# Patient Record
Sex: FEMALE | Race: WHITE | NOT HISPANIC OR LATINO | Employment: UNEMPLOYED
[De-identification: names, ages, dates, MRNs, and addresses within clinical notes are randomized per-mention and may not be internally consistent; named-entity substitution may affect disease eponyms.]

---

## 2017-09-03 ENCOUNTER — HEALTH MAINTENANCE LETTER (OUTPATIENT)
Age: 27
End: 2017-09-03

## 2018-11-19 ENCOUNTER — OFFICE VISIT (OUTPATIENT)
Dept: OBGYN | Facility: CLINIC | Age: 28
End: 2018-11-19
Payer: COMMERCIAL

## 2018-11-19 VITALS
SYSTOLIC BLOOD PRESSURE: 143 MMHG | DIASTOLIC BLOOD PRESSURE: 77 MMHG | TEMPERATURE: 98.8 F | HEART RATE: 90 BPM | WEIGHT: 126.4 LBS | HEIGHT: 61 IN | RESPIRATION RATE: 16 BRPM | BODY MASS INDEX: 23.86 KG/M2

## 2018-11-19 DIAGNOSIS — Z30.46 ENCOUNTER FOR NEXPLANON REMOVAL: Primary | ICD-10-CM

## 2018-11-19 DIAGNOSIS — Z30.013 ENCOUNTER FOR INITIAL PRESCRIPTION OF INJECTABLE CONTRACEPTIVE: ICD-10-CM

## 2018-11-19 PROCEDURE — 96372 THER/PROPH/DIAG INJ SC/IM: CPT | Mod: 59 | Performed by: OBSTETRICS & GYNECOLOGY

## 2018-11-19 PROCEDURE — 11982 REMOVE DRUG IMPLANT DEVICE: CPT | Performed by: OBSTETRICS & GYNECOLOGY

## 2018-11-19 RX ORDER — NALTREXONE 380 MG
KIT INTRAMUSCULAR
COMMUNITY
Start: 2018-10-31 | End: 2021-11-02

## 2018-11-19 RX ORDER — MEDROXYPROGESTERONE ACETATE 150 MG/ML
150 INJECTION, SUSPENSION INTRAMUSCULAR
Qty: 0.9 ML | Refills: 4 | OUTPATIENT
Start: 2018-11-19 | End: 2021-11-02

## 2018-11-19 NOTE — PROGRESS NOTES
Nexplanon Removal Procedure Note    Beryl Valenzuela  1990  [id number]    The patient was counseled on the risks benefits, and alternatives of the procedure - please see attached note for counseling. Nexplanon now 6 yrs old. Pt reports still having long periods of amenorrhea on it. Had pregnancy test in Oct in treatment center and hasn't had intercourse since then. Pt refused UPT today. Verbal and written consent were obtained for removal of  Nexplanon.     Technique: The patient was placed in the dorsal position with the left arm elevated. The Nexplanon was located in the left arm. Local anesthesia was injected and the area was cleaned with betadine swabs x3. An incision was made over the end of the Nexplanon, grasped with a yisel clamp and removed intact. A steri-strip was placed over the insertion site. A compression dressing was placed. The patient tolerated the procedure well. EBL: 2cc.  She was given post op instructions with appropriate precautions.     A/P: s/p Nexplanon removal as it is . Desires depo provera which was given today. She was counseled on the bleeding profile of this, which she has had in the past and tolerated without any issues. Return in three months for repeat injection.     Ju Pardo MD  OB/GYN   18

## 2018-11-19 NOTE — NURSING NOTE
"Initial /77 (BP Location: Right arm, Patient Position: Chair, Cuff Size: Adult Regular)  Pulse 90  Temp 98.8  F (37.1  C) (Tympanic)  Resp 16  Ht 5' 1\" (1.549 m)  Wt 126 lb 6.4 oz (57.3 kg)  LMP 11/17/2018  BMI 23.88 kg/m2 Estimated body mass index is 23.88 kg/(m^2) as calculated from the following:    Height as of this encounter: 5' 1\" (1.549 m).    Weight as of this encounter: 126 lb 6.4 oz (57.3 kg). .      "

## 2018-11-19 NOTE — MR AVS SNAPSHOT
After Visit Summary   11/19/2018    Beryl Valenzuela    MRN: [id number]           Patient Information     Date Of Birth          1990        Visit Information        Provider Department      11/19/2018 1:30 PM Ju Pardo MD [facility]        Today's Diagnoses     Encounter for Nexplanon removal    -  1    Encounter for initial prescription of injectable contraceptive           Follow-ups after your visit        Future tests that were ordered for you today     Open Standing Orders        Priority Remaining Interval Expires Ordered    Medroxyprogesterone inj  1mg   (Depo Provera J-Code) Routine 3/4  11/19/2019 11/19/2018    INJECTION INTRAMUSCULAR OR SUB-Q Routine 3/4  11/19/2019 11/19/2018            Who to contact     If you have questions or need follow up information about today's clinic visit or your schedule please contact [facility] directly at [phone].  Normal or non-critical lab and imaging results will be communicated to you by MyChart, letter or phone within 4 business days after the clinic has received the results. If you do not hear from us within 7 days, please contact the clinic through [employer] or phone. If you have a critical or abnormal lab result, we will notify you by phone as soon as possible.  Submit refill requests through [employer] or call your pharmacy and they will forward the refill request to us. Please allow 3 business days for your refill to be completed.          Additional Information About Your Visit        MyChart Information     [employer] gives you secure access to your electronic health record. If you see a primary care provider, you can also send messages to your care team and make appointments. If you have questions, please call your primary care clinic.  If you do not have a primary care provider, please call [phone] and they will assist you.        Care EveryWhere ID     This is your Care EveryWhere ID. This  "could be used by other organizations to access your [facility] medical records  [id number]        Your Vitals Were     Pulse Temperature Respirations Height Last Period BMI (Body Mass Index)    90 98.8  F (37.1  C) (Tympanic) 16 5' 1\" (1.549 m) 11/17/2018 23.88 kg/m2       Blood Pressure from Last 3 Encounters:   11/19/18 143/77   02/15/16 121/78   07/09/15 127/88    Weight from Last 3 Encounters:   11/19/18 126 lb 6.4 oz (57.3 kg)   07/09/15 117 lb (53.1 kg)   07/06/15 117 lb 8 oz (53.3 kg)              We Performed the Following     REMOVAL NEXPLANON          Today's Medication Changes          These changes are accurate as of 11/19/18 11:59 PM.  If you have any questions, ask your nurse or doctor.               Start taking these medicines.        Dose/Directions    medroxyPROGESTERone 150 MG/ML injection   Commonly known as:  DEPO-PROVERA   Used for:  Encounter for initial prescription of injectable contraceptive   Started by:  Ju Pardo MD        Dose:  150 mg   Inject 1 mL (150 mg) into the muscle every 3 months   Quantity:  0.9 mL   Refills:  4            Where to get your medicines      Some of these will need a paper prescription and others can be bought over the counter.  Ask your nurse if you have questions.     You don't need a prescription for these medications     medroxyPROGESTERone 150 MG/ML injection                Primary Care Provider Office Phone # Fax #    Carlos Cisneros PA-C [phone] [address] [facility] [address]        Equal Access to Services     [facility] AH: Hadii aad ku hadasho Soelysia, waaxda luqadaha, qaybta kaalmada yoan, suzie zavaleta. So [facility] [phone].    ATENCIÓN: Si habla español, tiene a ruby disposición servicios gratuitos de asistencia lingüística. Llame al [phone].    We comply with applicable federal civil rights laws and [place] laws. We do not discriminate on the basis of race, " color, national origin, age, disability, sex, sexual orientation, or gender identity.            Thank you!     Thank you for choosing [facility]  for your care. Our goal is always to provide you with excellent care. Hearing back from our patients is one way we can continue to improve our services. Please take a few minutes to complete the written survey that you may receive in the mail after your visit with us. Thank you!             Your Updated Medication List - Protect others around you: Learn how to safely use, store and throw away your medicines at www.disposemymeds.org.          This list is accurate as of 11/19/18 11:59 PM.  Always use your most recent med list.                   Brand Name Dispense Instructions for use Diagnosis    ADDERALL XR 20 MG 24 hr capsule   Generic drug:  amphetamine-dextroamphetamine      Take 2 capsules by mouth daily        buPROPion 300 MG 24 hr tablet    WELLBUTRIN XL     Take 1 tablet by mouth every morning        etonogestrel 68 MG Impl    IMPLANON/NEXPLANON     1 each by Subdermal route once        HYDROcodone-acetaminophen 5-325 MG per tablet    NORCO    10 tablet    Take 1 tablet by mouth every 6 hours as needed for moderate to severe pain    Open fracture of tuft of distal phalanx of finger, initial encounter       medroxyPROGESTERone 150 MG/ML injection    DEPO-PROVERA    0.9 mL    Inject 1 mL (150 mg) into the muscle every 3 months    Encounter for initial prescription of injectable contraceptive       mirtazapine 15 MG tablet    REMERON    30 tablet    Take 1 tablet (15 mg) by mouth At Bedtime    Insomnia       ondansetron 4 MG ODT tab    ZOFRAN ODT    10 tablet    Take 1 tablet (4 mg) by mouth every 8 hours as needed for nausea    Contusion of thumb with damage to nail, Nail avulsion, finger, initial encounter       * oxyCODONE IR 5 MG tablet    ROXICODONE    15 tablet    Take 1 tablet (5 mg) by mouth every 6 hours as needed for moderate to severe pain     Contusion of thumb with damage to nail, Nail avulsion, finger, initial encounter       * oxyCODONE 5 MG capsule    OXY-IR    5 capsule    Take 1 capsule (5 mg) by mouth every 6 hours as needed for moderate to severe pain    Broken thumb, right, closed, initial encounter       phenazopyridine 100 MG tablet    PYRIDIUM    6 tablet    Take 1-2 tablets (100-200 mg) by mouth 3 times daily as needed for urinary tract discomfort    Dysuria       VIVITROL 380 MG Susr   Generic drug:  naltrexone           XANAX PO      Take 0.5 mg by mouth 4 times daily        * Notice:  This list has 2 medication(s) that are the same as other medications prescribed for you. Read the directions carefully, and ask your doctor or other care provider to review them with you.

## 2019-04-10 ENCOUNTER — TELEPHONE (OUTPATIENT)
Dept: OBGYN | Facility: CLINIC | Age: 29
End: 2019-04-10

## 2019-04-10 NOTE — LETTER
April 10, 2019      Beryl Valenzuela  [address] [facility] [address]        Dear ,      This letter is to remind you that you are due for your follow up PAP smear .    Please call [phone] to schedule your appointment at your earliest convenience.     If you have completed the tests outside of [facility], please have the results forwarded to our office. We will update the chart for your primary Physician to review before your next annual physical.         Sincerely,      Your [facility] Care Team

## 2019-04-10 NOTE — TELEPHONE ENCOUNTER
Panel Management Review      Health Maintenance List    Health Maintenance   Topic Date Due     PHQ-2 Q1 YR  05/24/2003     URINE DRUG SCREEN Q1 YR  05/24/2006     PREVENTIVE CARE VISIT  09/14/2013     PAP Q2 YEARS  09/14/2014     INFLUENZA VACCINE (1) 09/01/2018     DTAP/TDAP/TD IMMUNIZATION (8 - Td) 05/23/2022     ZOSTER IMMUNIZATION (1 of 2) 05/24/2041     MIGRAINE ACTION PLAN  Completed     HIV SCREEN (SYSTEM ASSIGNED)  Completed     IPV IMMUNIZATION  Completed     MENINGITIS IMMUNIZATION  Aged Out       Composite cancer screening  Chart review shows that this patient is due/due soon for the following Pap Smear  Lab Results   Component Value Date    PAP NIL 09/14/2012     Past Surgical History:   Procedure Laterality Date     NO HISTORY OF SURGERY         Is hysterectomy listed in surgical history? No   Is mastectomy listed in surgical history? No     Summary:    Patient is due/failing the following:   Pap Smear    Action needed: Patient needs office visit for Pap Smear.    Type of outreach:  Sent [employer] message.      Staff Signature:  Anai Springer LPN

## 2019-09-11 ENCOUNTER — TELEPHONE (OUTPATIENT)
Dept: OBGYN | Facility: CLINIC | Age: 29
End: 2019-09-11

## 2019-09-11 NOTE — LETTER
September 11, 2019      Beryl Valenzuela  [address] [facility] [address]    Dear ,      This letter is to remind you that you are due for your follow up PAP smear.    Please call [phone] to schedule your appointment at your earliest convenience.     If you have completed the tests outside of [facility], please have the results forwarded to our office. We will update the chart for your primary Physician to review before your next annual physical.     Sincerely,      Your [facility] Care Team

## 2019-09-11 NOTE — TELEPHONE ENCOUNTER
Panel Management Review      Health Maintenance List    Health Maintenance   Topic Date Due     URINE DRUG SCREEN  1990     PREVENTIVE CARE VISIT  09/14/2013     PAP  09/14/2014     PHQ-2  01/01/2019     INFLUENZA VACCINE (1) 09/01/2019     DTAP/TDAP/TD IMMUNIZATION (8 - Td) 05/23/2022     HIV SCREENING  Completed     MIGRAINE ACTION PLAN  Completed     IPV IMMUNIZATION  Completed     MENINGITIS IMMUNIZATION  Aged Out       Composite cancer screening  Chart review shows that this patient is due/due soon for the following Pap Smear  Lab Results   Component Value Date    PAP NIL 09/14/2012     Past Surgical History:   Procedure Laterality Date     NO HISTORY OF SURGERY         Is hysterectomy listed in surgical history? No   Is mastectomy listed in surgical history? No     Summary:    Patient is due/failing the following:   Pap Smear    Action needed: Patient needs office visit for Pap.    Type of outreach:  Sent [employer] message.      Staff Signature:  Anai Springer LPN

## 2020-02-17 ENCOUNTER — HEALTH MAINTENANCE LETTER (OUTPATIENT)
Age: 30
End: 2020-02-17

## 2020-11-29 ENCOUNTER — HEALTH MAINTENANCE LETTER (OUTPATIENT)
Age: 30
End: 2020-11-29

## 2020-12-17 ENCOUNTER — OFFICE VISIT (OUTPATIENT)
Dept: URGENT CARE | Facility: URGENT CARE | Age: 30
End: 2020-12-17
Payer: COMMERCIAL

## 2020-12-17 VITALS
HEART RATE: 88 BPM | SYSTOLIC BLOOD PRESSURE: 118 MMHG | DIASTOLIC BLOOD PRESSURE: 72 MMHG | TEMPERATURE: 97.6 F | OXYGEN SATURATION: 100 %

## 2020-12-17 DIAGNOSIS — M25.511 ACUTE PAIN OF RIGHT SHOULDER: Primary | ICD-10-CM

## 2020-12-17 PROCEDURE — 99203 OFFICE O/P NEW LOW 30 MIN: CPT | Performed by: NURSE PRACTITIONER

## 2020-12-17 RX ORDER — CYCLOBENZAPRINE HCL 10 MG
10 TABLET ORAL 3 TIMES DAILY PRN
Qty: 21 TABLET | Refills: 0 | Status: SHIPPED | OUTPATIENT
Start: 2020-12-17 | End: 2020-12-24

## 2020-12-17 RX ORDER — METHYLPREDNISOLONE 4 MG
TABLET, DOSE PACK ORAL
Qty: 21 TABLET | Refills: 0 | Status: SHIPPED | OUTPATIENT
Start: 2020-12-17 | End: 2021-11-02

## 2020-12-17 ASSESSMENT — ENCOUNTER SYMPTOMS
SORE THROAT: 0
DIARRHEA: 0
RHINORRHEA: 0
VOMITING: 0
COUGH: 0
NAUSEA: 0
SHORTNESS OF BREATH: 0
HEADACHES: 0
CHILLS: 0
FEVER: 0

## 2020-12-17 NOTE — PROGRESS NOTES
"SUBJECTIVE:   Beryl Valenzuela is a 30 year old female presenting with a chief complaint of   Chief Complaint   Patient presents with     Shoulder Pain     right shoulder pain no injury known       She is new patient of [facility].    Left shoulder Pain    Onset of symptoms was 1 week(s) ago.  Location: right shoulder    Course of symptoms is worsening.    Severity moderate  Current and Associated symptoms: Pain  Denies  Bruising and Warmth  Aggravating Factors: weight-bearing, movement and repetitive motion  Therapies to improve symptoms include: ibuprofen  This is the first time this type of problem has occurred for this patient.     Review of Systems   Constitutional: Negative for chills and fever.   HENT: Negative for congestion, ear pain, rhinorrhea and sore throat.    Respiratory: Negative for cough and shortness of breath.    Gastrointestinal: Negative for diarrhea, nausea and vomiting.   Musculoskeletal:        Right shoulder pain   Neurological: Negative for headaches.   All other systems reviewed and are negative.      Past Medical History:   Diagnosis Date     Antisocial personality     with borderline traits     Anxiety      Bipolar disorder (H)      Migraine      Mood disorder (H)      PTSD (post-traumatic stress disorder)     severe     Family History   Problem Relation Age of Onset     Arthritis Mother      Depression Mother      Hypertension Mother      Depression Father      Cancer Maternal Grandmother         brain cancer     Arthritis Maternal Grandmother      Diabetes Maternal Grandfather      Arthritis Maternal Grandfather      Musculoskeletal Disorder Maternal Grandfather         \"muscle dz\"     Hypertension Paternal Grandmother      Hypertension Paternal Grandfather      Asthma Sister      C.A.D. No family hx of      Breast Cancer No family hx of      Cancer - colorectal No family hx of      Prostate Cancer No family hx of      Heart Disease No family hx of      Lipids No family hx of      " Thyroid Disease No family hx of      Cerebrovascular Disease Other      Current Outpatient Medications   Medication Sig Dispense Refill     ALPRAZolam (XANAX PO) Take 0.5 mg by mouth 4 times daily       amphetamine-dextroamphetamine (ADDERALL XR) 20 MG per capsule Take 2 capsules by mouth daily       buPROPion (WELLBUTRIN XL) 300 MG 24 hr tablet Take 1 tablet by mouth every morning       cyclobenzaprine (FLEXERIL) 10 MG tablet Take 1 tablet (10 mg) by mouth 3 times daily as needed for muscle spasms 21 tablet 0     etonogestrel (IMPLANON/NEXPLANON) 68 MG IMPL 1 each by Subdermal route once       methylPREDNISolone (MEDROL DOSEPAK) 4 MG tablet therapy pack Follow Package Directions 21 tablet 0     VIVITROL 380 MG SUSR        HYDROcodone-acetaminophen (NORCO) 5-325 MG per tablet Take 1 tablet by mouth every 6 hours as needed for moderate to severe pain (Patient not taking: Reported on 11/19/2018) 10 tablet 0     medroxyPROGESTERone (DEPO-PROVERA) 150 MG/ML injection Inject 1 mL (150 mg) into the muscle every 3 months (Patient not taking: Reported on 12/17/2020) 0.9 mL 4     mirtazapine (REMERON) 15 MG tablet Take 1 tablet (15 mg) by mouth At Bedtime 30 tablet 1     ondansetron (ZOFRAN ODT) 4 MG disintegrating tablet Take 1 tablet (4 mg) by mouth every 8 hours as needed for nausea (Patient not taking: Reported on 11/19/2018) 10 tablet 0     oxyCODONE (OXY-IR) 5 MG capsule Take 1 capsule (5 mg) by mouth every 6 hours as needed for moderate to severe pain (Patient not taking: Reported on 11/19/2018) 5 capsule 0     oxyCODONE (ROXICODONE) 5 MG immediate release tablet Take 1 tablet (5 mg) by mouth every 6 hours as needed for moderate to severe pain (Patient not taking: Reported on 11/19/2018) 15 tablet 0     phenazopyridine (PYRIDIUM) 100 MG tablet Take 1-2 tablets (100-200 mg) by mouth 3 times daily as needed for urinary tract discomfort (Patient not taking: Reported on 11/19/2018) 6 tablet 0     Social History      Tobacco Use     Smoking status: Current Every Day Smoker     Packs/day: 0.50     Types: Cigarettes     Smokeless tobacco: Never Used   Substance Use Topics     Alcohol use: No       OBJECTIVE  /72   Pulse 88   Temp 97.6  F (36.4  C) (Tympanic)   SpO2 100%     Physical Exam  Vitals signs and nursing note reviewed.   Constitutional:       General: She is not in acute distress.     Appearance: She is well-developed. She is not diaphoretic.   HENT:      Head: Normocephalic and atraumatic.      Right Ear: External ear normal.      Left Ear: External ear normal.   Eyes:      Pupils: Pupils are equal, round, and reactive to light.   Neck:      Musculoskeletal: Normal range of motion and neck supple.   Pulmonary:      Effort: Pulmonary effort is normal. No respiratory distress.      Breath sounds: Normal breath sounds.   Musculoskeletal:      Comments: Anterolateral  Left shoulder pain that is worse with upward reaching. No numbness or weakness noted. Range of motion mildly reduced by pain. No deformity is noted.   Lymphadenopathy:      Cervical: No cervical adenopathy.   Skin:     General: Skin is warm and dry.   Neurological:      Mental Status: She is alert.      Cranial Nerves: No cranial nerve deficit.         Labs:  No results found for this or any previous visit (from the past 24 hour(s)).    X-Ray was not done.    ASSESSMENT:      ICD-10-CM    1. Acute pain of right shoulder  M25.511 methylPREDNISolone (MEDROL DOSEPAK) 4 MG tablet therapy pack     cyclobenzaprine (FLEXERIL) 10 MG tablet        Medical Decision Making:    Differential Diagnosis:  sprain, tendonitis, muscle strain, bursitis and osteoarthritis        PLAN:  Rest painful area  ICE  Elevated  Pain medication as advised  Side effects of medication discussed  As pain subsides, stretching is advised  Consider physical therapy if pain is persisting after symptomatic treatment  All questions answered and patient is in agreement with treatment  paln          Patient Instructions     Patient Education     Arthralgia    Arthralgia is the term for pain in or around the joint. It is a symptom, not a disease. This pain may involve one or more joints. In some cases, the pain moves from joint to joint.  There are many causes for joint pain. These include:    Injury    Wearing out the joint surface (osteoarthritis)    Inflammation of the joint because of crystals in the joint fluid (gout)    Infection inside the joint      Inflammation of the fluid-filled sacs around the joint (bursitis)    Autoimmune disorders such as rheumatoid arthritis or lupus    Inflammation of chords that attach muscle to bone (tendonitis)  Home care    Rest the involved joint(s) until your symptoms improve.     Eat a healthy diet, exercise as advised by your healthcare provider and stay at a healthy weight    You may be prescribed pain medicine. If none is prescribed, you may use acetaminophen or ibuprofen to control pain and inflammation.    Follow-up care  Follow up with your healthcare provider or as advised.  When to seek medical advice  Call your healthcare provider right away if any of the following occurs:    Pain, swelling, or redness of joint increases    Pain worsens or recurs after a period of improvement    Pain moves to other joints    You cannot bear weight on the affected joint     You cannot move the affected joint    Joint appears deformed    New rash appears    Fever of 100.4 F (38 C) or higher, or as directed by your healthcare provider    New symptoms appear  [employer] last reviewed this educational content on 8/1/2019 2000-2020 [employer]. [address] [place] [address], [place] [address]. All rights reserved. This information is not intended as a substitute for professional medical care. Always follow your healthcare professional's instructions.

## 2020-12-17 NOTE — PATIENT INSTRUCTIONS
Patient Education     Arthralgia    Arthralgia is the term for pain in or around the joint. It is a symptom, not a disease. This pain may involve one or more joints. In some cases, the pain moves from joint to joint.  There are many causes for joint pain. These include:    Injury    Wearing out the joint surface (osteoarthritis)    Inflammation of the joint because of crystals in the joint fluid (gout)    Infection inside the joint      Inflammation of the fluid-filled sacs around the joint (bursitis)    Autoimmune disorders such as rheumatoid arthritis or lupus    Inflammation of chords that attach muscle to bone (tendonitis)  Home care    Rest the involved joint(s) until your symptoms improve.     Eat a healthy diet, exercise as advised by your healthcare provider and stay at a healthy weight    You may be prescribed pain medicine. If none is prescribed, you may use acetaminophen or ibuprofen to control pain and inflammation.    Follow-up care  Follow up with your healthcare provider or as advised.  When to seek medical advice  Call your healthcare provider right away if any of the following occurs:    Pain, swelling, or redness of joint increases    Pain worsens or recurs after a period of improvement    Pain moves to other joints    You cannot bear weight on the affected joint     You cannot move the affected joint    Joint appears deformed    New rash appears    Fever of 100.4 F (38 C) or higher, or as directed by your healthcare provider    New symptoms appear  [employer] last reviewed this educational content on 8/1/2019 2000-2020 [employer]. [address] [place] [address], [place] [address]. All rights reserved. This information is not intended as a substitute for professional medical care. Always follow your healthcare professional's instructions.

## 2021-03-24 ENCOUNTER — OFFICE VISIT (OUTPATIENT)
Dept: URGENT CARE | Facility: URGENT CARE | Age: 31
End: 2021-03-24
Payer: COMMERCIAL

## 2021-03-24 VITALS
DIASTOLIC BLOOD PRESSURE: 80 MMHG | OXYGEN SATURATION: 97 % | HEART RATE: 92 BPM | TEMPERATURE: 97.5 F | SYSTOLIC BLOOD PRESSURE: 118 MMHG

## 2021-03-24 DIAGNOSIS — J02.9 SORE THROAT: ICD-10-CM

## 2021-03-24 DIAGNOSIS — G89.29 CHRONIC RIGHT SHOULDER PAIN: Primary | ICD-10-CM

## 2021-03-24 DIAGNOSIS — M25.511 CHRONIC RIGHT SHOULDER PAIN: Primary | ICD-10-CM

## 2021-03-24 PROCEDURE — 99214 OFFICE O/P EST MOD 30 MIN: CPT | Performed by: PHYSICIAN ASSISTANT

## 2021-03-24 PROCEDURE — U0005 INFEC AGEN DETEC AMPLI PROBE: HCPCS | Performed by: PHYSICIAN ASSISTANT

## 2021-03-24 PROCEDURE — U0003 INFECTIOUS AGENT DETECTION BY NUCLEIC ACID (DNA OR RNA); SEVERE ACUTE RESPIRATORY SYNDROME CORONAVIRUS 2 (SARS-COV-2) (CORONAVIRUS DISEASE [COVID-19]), AMPLIFIED PROBE TECHNIQUE, MAKING USE OF HIGH THROUGHPUT TECHNOLOGIES AS DESCRIBED BY CMS-2020-01-R: HCPCS | Performed by: PHYSICIAN ASSISTANT

## 2021-03-24 RX ORDER — CYCLOBENZAPRINE HCL 5 MG
5 TABLET ORAL 3 TIMES DAILY PRN
Qty: 21 TABLET | Refills: 0 | Status: SHIPPED | OUTPATIENT
Start: 2021-03-24 | End: 2021-11-02

## 2021-03-25 LAB
LABORATORY COMMENT REPORT: NORMAL
SARS-COV-2 RNA RESP QL NAA+PROBE: NEGATIVE
SARS-COV-2 RNA RESP QL NAA+PROBE: NORMAL
SPECIMEN SOURCE: NORMAL
SPECIMEN SOURCE: NORMAL

## 2021-03-25 NOTE — PROGRESS NOTES
HPI:  Beryl is a 31 yo female who presents for two issues:    1.  Right shoulder pain x 8 months.  No trauma.  Onset began around time her child was born and worsened with lifting her child with that arm.  Pain is generalized in the joint, no specific sit.  Worse in morning when she wakes up and after she lies on it.  Aso exacerbaated by lifting.  Reports pain while in clinic today is not severe.  She reports she has full ROM.  She has taken Ibuprofen and tylenol for relief, and has also done heat & ice therapy, but pain still persists.  Reports was seen at  in December for same shoulder pain and was given a Medrol dose portillo and muscle relaxor and that seemed to help, but pain returned.      2.  Sore throat x 3 days.  Denies fever, cough, muscles aches, congestion or SOB.  Patient reports possible exposure of COVID.  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  The child she was babysitting has not been tested.  However when she got a sore throat she thought she should get a COVID test to rule it out.    Patient defers strep test unless presentation warrants it.  She states she has never had strep and has been tested multiple times in the past for strep but has never been positive.     ROS:  See HPI      PE:  Vitals & nursing notes reviewed. B/P: 118/80, T: 97.5, P: 92, R: Data Unavailable  Constitutional:  Alert, well nourished, well-developed, NAD  Head:  Atraumatic, normocephalic  Eyes:  Perrla, EOMI, conjunctiva:  Pink   Sclera:  Anicteric  Ears:  Canals clear BL, TM pearly BL  Throat:  (+) minor erythema, No exudates, or edema to postoropharynx  Neck:  Supple, no cervical LAD  Lungs:  CTA, no wheezes, rhonchi, or rales  CV:  RRR,  no murmur appreciated  Right SHoulder:  No edema or ecchymosis.  Full ROM intact.  Drop arm test negative for pain but 4+/5 strength.  IR &  ER fully intact.  Mild tenderness over bicep tendon attachment, otherwise NTTP.  IR and ER against resistance negative with 5/5 strength.  Bicep flexion against resistance negative for pain with 5/5 strength.  Bursa NTTP.        ASSESSMENT:  1.  Chronic right shoulder pain  X approx. 8  months  Comment: Deferred Xray today as no trauma and PE grossly normal with mild TTP at bicep tendon.  Pain is generalized.  Likely overuse injury.    Plan: cyclobenzaprine (FLEXERIL) 5 MG tablet  Continue alternating Advil & Tylenol PRN.  Avoid lifting and laying on shoulder.  As this is chronic, recommend Follow-up with ORTHO for eval and possible steroid injection.  May also require PT.           2. Sore throat  Comment: Patient defers strep test today as she has never had positive strep for sore throat.  THroat presentation low suspicion for strep and likely viral.  If presists may do strep at that time. COVID test pending.  Plan: Symptomatic COVID-19 Virus (Coronavirus) by PCR  Warm saline gargle BID-TID. Tylenol or advil for pain & fever PRN.  F/U with PCP if sx persist or worsen.

## 2021-04-10 ENCOUNTER — HEALTH MAINTENANCE LETTER (OUTPATIENT)
Age: 31
End: 2021-04-10

## 2021-09-25 ENCOUNTER — HEALTH MAINTENANCE LETTER (OUTPATIENT)
Age: 31
End: 2021-09-25

## 2021-10-19 ENCOUNTER — TELEPHONE (OUTPATIENT)
Dept: PSYCHIATRY | Facility: CLINIC | Age: 31
End: 2021-10-19

## 2021-10-19 NOTE — TELEPHONE ENCOUNTER
PSYCHIATRY CLINIC PHONE INTAKE     SERVICES REQUESTED / INTERESTED IN          Med Management     Presenting Problem and Brief History                              What would you like to be seen for? (brief description):  Pt was diagnosed at 14. She's going to having another evalution in a couple of weeks with [facility]. She currently takes wellbutrin adderall, and klonipin, tizaindine and gabapentin. Pt's psychiatrist has been managing the medications, but pt is looking for another provider with more availability. No concerns with medications. Pt's depression and anxiety have been really bad, since her baby was born about 15 months ago. Pt is crying all day every day for the last 2 months.     Have you received a mental health diagnosis? Yes   Which one (s): bi-polar 2, PTSD, panic disorder, JEREMY, ADHD, anti-social personality with borderline traits.   Is there any history of developmental delay?  No   Are you currently seeing a mental health provider?  Yes            Who / month last seen:  [facility] - all providers  Do you have mental health records elsewhere?  No  Will you sign a release so we can obtain them? No    Have you ever been hospitalized for psychiatric reasons?  Yes  Describe:  In 2014 due to accidental drug intake    Do you have current thoughts of self-harm?  No    Do you currently have thoughts of harming others?  No       Substance Use History     Do you have any history of alcohol / illicit drug use?  Yes  Describe:  Heroin - Sober for 14 months relapsed, and is now sober for 2 months. Pt's daughter's father is suing for custody of their daughter and it triggered the relapse.   Have you ever received treatment for this?  Yes    Describe:  [facility] was the last treatment facility     Social History     Who is the patient's a guardian?  No    Name / number: NA  Have you had an ACT team in last 12 months?  No  Describe: NA   OK to leave a detailed voicemail?   Yes    Would you be interested in learning more about research opportunities for which you or your child may qualify? We can connect you with a team member for more information.  Yes  If yes, send an inbasket message to Shilpa Lay    Medical/ Surgical History                                   Patient Active Problem List   Diagnosis     CARDIOVASCULAR SCREENING; LDL GOAL LESS THAN 160     Osteoarthritis     Abnormal Pap smear of cervix     Antisocial personality     Anxiety     Mood disorder (H)     Bipolar disorder (H)     Hypermobility syndrome     Acute right otitis media     Migraine     Neck pain     Hx of drug abuse (H)     Positive SHANTA (antinuclear antibody)     Fibromyalgia     Insomnia     Cluster B personality disorder (H)          Medications             Current Outpatient Medications   Medication Sig Dispense Refill     ALPRAZolam (XANAX PO) Take 0.5 mg by mouth 4 times daily       amphetamine-dextroamphetamine (ADDERALL XR) 20 MG per capsule Take 2 capsules by mouth daily       buPROPion (WELLBUTRIN XL) 300 MG 24 hr tablet Take 1 tablet by mouth every morning       cyclobenzaprine (FLEXERIL) 5 MG tablet Take 1 tablet (5 mg) by mouth 3 times daily as needed for muscle spasms 21 tablet 0     etonogestrel (IMPLANON/NEXPLANON) 68 MG IMPL 1 each by Subdermal route once       HYDROcodone-acetaminophen (NORCO) 5-325 MG per tablet Take 1 tablet by mouth every 6 hours as needed for moderate to severe pain (Patient not taking: Reported on 11/19/2018) 10 tablet 0     medroxyPROGESTERone (DEPO-PROVERA) 150 MG/ML injection Inject 1 mL (150 mg) into the muscle every 3 months (Patient not taking: Reported on 12/17/2020) 0.9 mL 4     methylPREDNISolone (MEDROL DOSEPAK) 4 MG tablet therapy pack Follow Package Directions (Patient not taking: Reported on 3/24/2021) 21 tablet 0     mirtazapine (REMERON) 15 MG tablet Take 1 tablet (15 mg) by mouth At Bedtime 30 tablet 1     ondansetron (ZOFRAN ODT) 4 MG disintegrating  tablet Take 1 tablet (4 mg) by mouth every 8 hours as needed for nausea (Patient not taking: Reported on 11/19/2018) 10 tablet 0     oxyCODONE (OXY-IR) 5 MG capsule Take 1 capsule (5 mg) by mouth every 6 hours as needed for moderate to severe pain (Patient not taking: Reported on 11/19/2018) 5 capsule 0     oxyCODONE (ROXICODONE) 5 MG immediate release tablet Take 1 tablet (5 mg) by mouth every 6 hours as needed for moderate to severe pain (Patient not taking: Reported on 11/19/2018) 15 tablet 0     phenazopyridine (PYRIDIUM) 100 MG tablet Take 1-2 tablets (100-200 mg) by mouth 3 times daily as needed for urinary tract discomfort (Patient not taking: Reported on 11/19/2018) 6 tablet 0     VIVITROL 380 MG SUSR            DISPOSITION      10/19/21 Intake completed. Ok for AGE w/ resident.     Daphne Bermudez,

## 2021-10-24 ENCOUNTER — OFFICE VISIT (OUTPATIENT)
Dept: URGENT CARE | Facility: URGENT CARE | Age: 31
End: 2021-10-24
Payer: COMMERCIAL

## 2021-10-24 VITALS
WEIGHT: 140 LBS | TEMPERATURE: 98.4 F | DIASTOLIC BLOOD PRESSURE: 79 MMHG | BODY MASS INDEX: 26.45 KG/M2 | HEART RATE: 88 BPM | OXYGEN SATURATION: 98 % | SYSTOLIC BLOOD PRESSURE: 122 MMHG

## 2021-10-24 DIAGNOSIS — B96.89 BACTERIAL VAGINOSIS: ICD-10-CM

## 2021-10-24 DIAGNOSIS — N76.0 BACTERIAL VAGINOSIS: ICD-10-CM

## 2021-10-24 DIAGNOSIS — N30.01 ACUTE CYSTITIS WITH HEMATURIA: ICD-10-CM

## 2021-10-24 DIAGNOSIS — R30.0 DYSURIA: Primary | ICD-10-CM

## 2021-10-24 DIAGNOSIS — F19.11 HX OF DRUG ABUSE (H): ICD-10-CM

## 2021-10-24 LAB
ALBUMIN UR-MCNC: 30 MG/DL
APPEARANCE UR: ABNORMAL
BACTERIA #/AREA URNS HPF: ABNORMAL /HPF
BILIRUB UR QL STRIP: ABNORMAL
CLUE CELLS: PRESENT
COLOR UR AUTO: YELLOW
GLUCOSE UR STRIP-MCNC: NEGATIVE MG/DL
HCG UR QL: NEGATIVE
HGB UR QL STRIP: NEGATIVE
KETONES UR STRIP-MCNC: ABNORMAL MG/DL
LEUKOCYTE ESTERASE UR QL STRIP: ABNORMAL
NITRATE UR QL: NEGATIVE
PH UR STRIP: 5.5 [PH] (ref 5–7)
RBC #/AREA URNS AUTO: ABNORMAL /HPF
SP GR UR STRIP: >=1.03 (ref 1–1.03)
SQUAMOUS #/AREA URNS AUTO: ABNORMAL /LPF
TRICHOMONAS, WET PREP: ABNORMAL
UROBILINOGEN UR STRIP-ACNC: 0.2 E.U./DL
WBC #/AREA URNS AUTO: >100 /HPF
WBC'S/HIGH POWER FIELD, WET PREP: ABNORMAL
YEAST, WET PREP: ABNORMAL

## 2021-10-24 PROCEDURE — 99214 OFFICE O/P EST MOD 30 MIN: CPT | Performed by: FAMILY MEDICINE

## 2021-10-24 PROCEDURE — 81025 URINE PREGNANCY TEST: CPT | Performed by: FAMILY MEDICINE

## 2021-10-24 PROCEDURE — 87186 SC STD MICRODIL/AGAR DIL: CPT | Performed by: FAMILY MEDICINE

## 2021-10-24 PROCEDURE — 81001 URINALYSIS AUTO W/SCOPE: CPT | Performed by: FAMILY MEDICINE

## 2021-10-24 PROCEDURE — 87088 URINE BACTERIA CULTURE: CPT | Performed by: FAMILY MEDICINE

## 2021-10-24 PROCEDURE — 87491 CHLMYD TRACH DNA AMP PROBE: CPT | Performed by: FAMILY MEDICINE

## 2021-10-24 PROCEDURE — 87591 N.GONORRHOEAE DNA AMP PROB: CPT | Performed by: FAMILY MEDICINE

## 2021-10-24 PROCEDURE — 87210 SMEAR WET MOUNT SALINE/INK: CPT | Performed by: FAMILY MEDICINE

## 2021-10-24 PROCEDURE — 87086 URINE CULTURE/COLONY COUNT: CPT | Performed by: FAMILY MEDICINE

## 2021-10-24 RX ORDER — METRONIDAZOLE 500 MG/1
500 TABLET ORAL 2 TIMES DAILY
Qty: 14 TABLET | Refills: 0 | Status: SHIPPED | OUTPATIENT
Start: 2021-10-24 | End: 2021-10-31

## 2021-10-24 RX ORDER — NALOXONE HYDROCHLORIDE 4 MG/.1ML
SPRAY NASAL
COMMUNITY
Start: 2021-09-15

## 2021-10-24 RX ORDER — BUPRENORPHINE HYDROCHLORIDE, NALOXONE HYDROCHLORIDE 4; 1 MG/1; MG/1
FILM, SOLUBLE BUCCAL; SUBLINGUAL
COMMUNITY
Start: 2021-09-29 | End: 2022-10-12

## 2021-10-24 RX ORDER — GABAPENTIN 600 MG/1
TABLET ORAL
COMMUNITY
Start: 2021-10-01 | End: 2021-11-02

## 2021-10-24 RX ORDER — CLONAZEPAM 1 MG/1
TABLET ORAL
COMMUNITY
Start: 2021-10-11 | End: 2022-03-28

## 2021-10-24 RX ORDER — NITROFURANTOIN 25; 75 MG/1; MG/1
100 CAPSULE ORAL 2 TIMES DAILY
Qty: 14 CAPSULE | Refills: 0 | Status: SHIPPED | OUTPATIENT
Start: 2021-10-24 | End: 2021-10-31

## 2021-10-24 NOTE — PATIENT INSTRUCTIONS
Patient Education     Bladder Infection, Female (Adult)     Urine normally doesn't have any germs (bacteria) in it. But bacteria can get into the urinary tract from the skin around the rectum. Or they can travel in the blood from other parts of the body. Once they are in your urinary tract, they can cause infection in these areas:    The urethra (urethritis)    The bladder (cystitis)    The kidneys (pyelonephritis)  The most common place for an infection is in the bladder. This is called a bladder infection. This is one of the most common infections in women. Most bladder infections are easily treated. They are not serious unless the infection spreads to the kidney.  The terms bladder infection, UTI, and cystitis are often used to describe the same thing. But they are not always the same. Cystitis is an inflammation of the bladder. The most common cause of cystitis is an infection.  Symptoms  The infection causes inflammation in the urethra and bladder. This causes many of the symptoms. The most common symptoms of a bladder infection are:    Pain or burning when urinating    Having to urinate more often than normal    Urgent need to urinate    Only a small amount of urine comes out    Blood in urine    Belly (abdominal) discomfort. This is often in the lower belly above the pubic bone.    Cloudy urine    Strong- or bad-smelling urine    Unable to urinate (urinary retention)    Unable to hold urine in (urinary incontinence)    Fever    Loss of appetite    Confusion (in older adults)  Causes  Bladder infections are not contagious. You can't get one from someone else, from a toilet seat, or from sharing a bath.  The most common cause of bladder infections is bacteria from the bowels. The bacteria get onto the skin around the opening of the urethra. From there, they can get into the urine. Then they travel up to the bladder, causing inflammation and infection. This often happens because of:    Wiping incorrectly after  urinating. Always wipe from front to back.    Bowel incontinence    Pregnancy    Procedures such as having a catheter put in    Older age    Not emptying your bladder. This can give bacteria a chance to grow in your urine.    Fluid loss (dehydration)    Constipation    Having sex    Using a diaphragm for birth control   Treatment  Bladder infections are diagnosed by a urine test and urine culture. They are treated with antibiotics. They often clear up quickly without problems. Treatment helps prevent a more serious kidney infection.  Medicines  Medicines can help in the treatment of a bladder infection:    Take antibiotics until they are used up, even if you feel better. It's important to finish them to make sure the infection has cleared.    You can use acetaminophen or ibuprofen for pain, fever, or discomfort, unless another medicine was prescribed. If you have long-term (chronic) liver or kidney disease, talk with your healthcare provider before using these medicines. Also talk with your provider if you've ever had a stomach ulcer or GI (gastrointestinal) bleeding, or are taking blood-thinner medicines.    If you are given phenazopydridine to reduce burning with urination, it will make your urine a bright orange color. This can stain clothing.  Care and prevention  These self-care steps can help prevent future infections:    Drink plenty of fluids. This helps to prevent dehydration and flush out your bladder. Do this unless you must restrict fluids for other health reasons, or your healthcare provider told you not to.    Clean yourself correctly after going to the bathroom. Wipe from front to back after using the toilet. This helps prevent the spread of bacteria.    Urinate more often. Don't try to hold urine in for a long time.    Wear loose-fitting clothes and cotton underwear. Don't wear tight-fitting pants.    Improve your diet and prevent constipation. Eat more fresh fruits and vegetables, and fiber. Eat  less junk foods and fatty foods.    Don't have sex until your symptoms are gone.    Don't have caffeine, alcohol, and spicy foods. These can irritate your bladder.    Urinate right after you have sex to flush out your bladder.    If you use birth control pills and have frequent bladder infections, discuss it with your healthcare provider.  Follow-up care  Call your healthcare provider if all symptoms are not gone after 3 days of treatment. This is especially important if you have repeat infections.  If a culture was done, you will be told if your treatment needs to be changed. If directed, you can call to find out the results.  If X-rays were done, you will be told if the results will affect your treatment.  Call 911  Call 911 if any of the following occur:    Trouble breathing    Hard to wake up or confusion    Fainting (loss of consciousness)    Fast heart rate  When to get medical advice  Call your healthcare provider right away if any of these occur:    Fever of 100.4 F (38.0 C) or higher, or as directed by your healthcare provider    Symptoms are not better after 3 days of treatment    Back or belly pain that gets worse    Repeated vomiting, or unable to keep medicine down    Weakness or dizziness    Vaginal discharge    Pain, redness, or swelling in the outer vaginal area (labia)  [employer] last reviewed this educational content on 11/1/2019 2000-2021 [employer]. All rights reserved. This information is not intended as a substitute for professional medical care. Always follow your healthcare professional's instructions.           Patient Education     Flagyl Oral Tablet 500 mg  Uses  For infection.  Instructions  This medicine may be taken with or without food.  Swallow with a full glass (8 oz) of water unless your doctor gives you different instructions.  You may take with food to prevent stomach upset.  Keep the medicine at room temperature. Avoid heat and direct light.  It is important that  you keep taking each dose of this medicine on time even if you are feeling well.  If you forget to take a dose on time, take it as soon as you remember. If it is almost time for the next dose, do not take the missed dose. Return to your normal dosing schedule. Do not take 2 doses of this medicine at one time.  Please tell your doctor and pharmacist about all the medicines you take. Include both prescription and over-the-counter medicines. Also tell them about any vitamins, herbal medicines, or anything else you take for your health.  It is very important that you follow your doctor's instructions for all blood tests.  It is very important that you keep all appointments for medical exams and tests while on this medicine.  Cautions  Tell your doctor and pharmacist if you ever had an allergic reaction to a medicine. Symptoms of an allergic reaction can include trouble breathing, skin rash, itching, swelling, or severe dizziness.  Do not use the medication any more than instructed.  Do not drink beverages with alcohol while on this medicine.  Please tell your doctor if you have moderate to severe diarrhea while on this medicine. Do not treat the diarrhea with over-the-counter diarrhea medicine.  Tell the doctor or pharmacist if you are pregnant, planning to be pregnant, or breastfeeding.  Ask your pharmacist if this medicine can interact with any of your other medicines. Be sure to tell them about all the medicines you take.  Do not start or stop any other medicines without first speaking to your doctor or pharmacist.  Do not share this medicine with anyone who has not been prescribed this medicine.  Side Effects  The following is a list of some common side effects from this medicine. Please speak with your doctor about what you should do if you experience these or other side effects.    decreased appetite    diarrhea    dizziness    headaches    nausea    stomach upset or abdominal pain    changes in taste or  unpleasant taste    vomiting  Call your doctor or get medical help right away if you notice any of these more serious side effects:    symptoms of liver damage (such as yellowing of skin or eyes, dark urine, unusual tiredness or weakness; severe stomach or back pain)    feeling of numbness or tingling in your hands and feet    vaginal itching or yeast infection  A few people may have an allergic reactions to this medicine. Symptoms can include difficulty breathing, skin rash, itching, swelling, or severe dizziness. If you notice any of these symptoms, seek medical help quickly.  Extra  Please speak with your doctor, nurse, or pharmacist if you have any questions about this medicine.  [email or web address]  IMPORTANT NOTE: This document tells you briefly how to take your medicine, but it does not tell you all there is to know about it.Your doctor or pharmacist may give you other documents about your medicine. Please talk to them if you have any questions.Always follow their advice. There is a more complete description of this medicine available in English.Scan this code on your smartphone or tablet or use the web address below. You can also ask your pharmacist for a printout. If you have any questions, please ask your pharmacist.     2021 [employer].           Patient Education     Bacterial Vaginosis    You have a vaginal infection called bacterial vaginosis (BV). Both good and bad bacteria are present in a healthy vagina. BV occurs when these bacteria get out of balance. The number of bad bacteria increase. And the number of good bacteria decrease. BV is linked with sexual activity, but it's not a sexually transmitted infection (STI).   BV may or may not cause symptoms. If symptoms do occur, they can include:     Thin, gray, milky-white, or sometimes green discharge    Unpleasant odor or  fishy  smell    Itching, burning, or pain in or around the vagina  It is not known what causes  BV, but certain factors can make the problem more likely. These can include:     Douching    Spermicides    Use of antibiotics    Change in hormone levels with pregnancy, breastfeeding, or menopause    Having sex with a new partner    Having sex with more than one partner  BV will sometimes go away on its own. But treatment is often advised. This is because untreated BV can raise the risk of more serious health problems such as:     Pelvic inflammatory disease (PID)     delivery (giving birth to a baby early if you re pregnant)    HIV and some other sexually transmitted infections (STIs)    Infection after surgery on the reproductive organs  Home care  General care    BV is most often treated with medicines called antibiotics. These may be given as pills or as a vaginal cream. If antibiotics are prescribed, be sure to use them exactly as directed. And complete all of the medicine, even if your symptoms go away.    Don't douche or having sex during treatment.    If you have sex with a female partner, ask your healthcare provider if she should also be treated.  Prevention    Don't douche.    Don't have sex. If you do have sex, then take steps to lower your risk:  ? Use condoms when having sex.  ? Limit the number of sex partners you have.    Follow-up care  Follow up with your healthcare provider, or as advised.   When to get medical advice  Call your healthcare provider right away if:     You have a fever of 100.4 F (38 C) or higher, or as directed by your provider.    Your symptoms get worse, or they don t go away within a few days of starting treatment.    You have new pain in the lower belly or pelvic region.    You have side effects that bother you or a reaction to the pills or cream you re prescribed.    You or any of your sex partners have new symptoms, such as a rash, joint pain, or sores.  [employer] last reviewed this educational content on 2020-2021 [employer]. All rights  reserved. This information is not intended as a substitute for professional medical care. Always follow your healthcare professional's instructions.

## 2021-10-24 NOTE — PROGRESS NOTES
Chief complaint: uti    Patient currently having custody issues    3 years ago got sober and was trying to sober and was doing 2 jobs   Dating her boss  Then found out she was pregnant during the pandemic  Was stressed   Ended up relapsing and went to treatment  Has been on methadone and now 14 months sober  Has been fighting with child father.  And then ended up relapsing before court and lost custody of her daughter  Went to residential   And went to treatment  Again    No thoughts of harming self or others   Feels safe at home    Urgency frequncy started about a week   Vaginal discharge or concerns: mild  Fever chills: None  Nausea vomiting: None  Flank Pain: None    Problem list and histories reviewed & adjusted, as indicated.  Additional history: as documented    Problem list, Medication list, Allergies, and Medical/Social/Surgical histories reviewed in EPIC and updated as appropriate.    ROS:  Constitutional, HEENT, cardiovascular, pulmonary, gi and gu systems are negative, except as otherwise noted.    OBJECTIVE:                                                    /79   Pulse 88   Temp 98.4  F (36.9  C) (Tympanic)   Wt 63.5 kg (140 lb)   LMP  (LMP Unknown)   SpO2 98%   BMI 26.45 kg/m    Body mass index is 26.45 kg/m .  GENERAL: healthy, alert and no distress  Eyes: anicteric  Awake alert not in any acute cardiorespiratory distress  Psych: pleasant   Neurologic: No gross neurologic deficits  Skin: no obvious lesions or rashes  MS: no gross musculoskeletal defects noted, no edema    Diagnostic Test Results:  Results for orders placed or performed in visit on 10/24/21 (from the past 24 hour(s))   UA macro with reflex to Microscopic and Culture - Clinc Collect    Specimen: Urine, Clean Catch   Result Value Ref Range    Color Urine Yellow Colorless, Straw, Light Yellow, Yellow    Appearance Urine Slightly Cloudy (A) Clear    Glucose Urine Negative Negative mg/dL    Bilirubin Urine Small (A) Negative    Ketones  Urine Trace (A) Negative mg/dL    Specific Gravity Urine >=1.030 1.003 - 1.035    Blood Urine Negative Negative    pH Urine 5.5 5.0 - 7.0    Protein Albumin Urine 30  (A) Negative mg/dL    Urobilinogen Urine 0.2 0.2, 1.0 E.U./dL    Nitrite Urine Negative Negative    Leukocyte Esterase Urine Moderate (A) Negative   Wet prep - Clinic Collect    Specimen: Vagina; Swab   Result Value Ref Range    Trichomonas Absent Absent    Yeast Absent Absent    Clue Cells Present (A) Absent    WBCs/high power field 4+ (A) None   Urine Microscopic Exam   Result Value Ref Range    Bacteria Urine Moderate (A) None Seen /HPF    RBC Urine 2-5 (A) 0-2 /HPF /HPF    WBC Urine >100 (A) 0-5 /HPF /HPF    Squamous Epithelials Urine Few (A) None Seen /LPF        Diagnostic Test Results:  Results for orders placed or performed in visit on 10/24/21 (from the past 24 hour(s))   UA macro with reflex to Microscopic and Culture - Clinc Collect    Specimen: Urine, Clean Catch   Result Value Ref Range    Color Urine Yellow Colorless, Straw, Light Yellow, Yellow    Appearance Urine Slightly Cloudy (A) Clear    Glucose Urine Negative Negative mg/dL    Bilirubin Urine Small (A) Negative    Ketones Urine Trace (A) Negative mg/dL    Specific Gravity Urine >=1.030 1.003 - 1.035    Blood Urine Negative Negative    pH Urine 5.5 5.0 - 7.0    Protein Albumin Urine 30  (A) Negative mg/dL    Urobilinogen Urine 0.2 0.2, 1.0 E.U./dL    Nitrite Urine Negative Negative    Leukocyte Esterase Urine Moderate (A) Negative   Wet prep - Clinic Collect    Specimen: Vagina; Swab   Result Value Ref Range    Trichomonas Absent Absent    Yeast Absent Absent    Clue Cells Present (A) Absent    WBCs/high power field 4+ (A) None   HCG Qual, Urine ([id number])   Result Value Ref Range    hCG Urine Qualitative Negative Negative   Urine Microscopic Exam   Result Value Ref Range    Bacteria Urine Moderate (A) None Seen /HPF    RBC Urine 2-5 (A) 0-2 /HPF /HPF    WBC Urine >100 (A) 0-5 /HPF  /HPF    Squamous Epithelials Urine Few (A) None Seen /LPF         ASSESSMENT/PLAN:                                                        ICD-10-CM    1. Dysuria  R30.0 UA macro with reflex to Microscopic and Culture - Clinc Collect     Wet prep - Clinic Collect     Chlamydia trachomatis PCR     Neisseria gonorrhoeae PCR     HCG Qual, Urine ([id number])     Urine Microscopic Exam     Urine Culture   2. Acute cystitis with hematuria  N30.01 nitroFURantoin macrocrystal-monohydrate (MACROBID) 100 MG capsule   3. Bacterial vaginosis  N76.0 metroNIDAZOLE (FLAGYL) 500 MG tablet    B96.89    4. Hx of drug abuse (H)  F19.11      Follow up with primary care provider recommended  Prescribed with above  Prescribed with metronidazole or flagyl. Warned about metallic taste and advised no alcohol until 24 hours after the last dose.   See AVS- discussed with patient   Aware to go to ER or come in immediately if with any fever chills nausea vomiting or flank pain.  Adverse reactions of medications discussed.  Over the counter medications discussed.   Aware to come back in if with worsening symptoms or if no relief despite treatment plan  Patient voiced understanding and had no further questions.     History of drug abuse and psych diagnosis noted. Patient no thoughts of harming self or others  Patient feels safe currently  Aware that if with any worsening depression, thoughts of harming self or others, call 911 or go to ER  Patient plans to follow up with her psychiatrist and establish care with a primary care provider .     MD Christi Jung MD  [facility] [place]

## 2021-10-26 LAB
C TRACH DNA SPEC QL NAA+PROBE: NEGATIVE
N GONORRHOEA DNA SPEC QL NAA+PROBE: NEGATIVE

## 2021-10-27 LAB — BACTERIA UR CULT: ABNORMAL

## 2021-11-02 ENCOUNTER — OFFICE VISIT (OUTPATIENT)
Dept: FAMILY MEDICINE | Facility: CLINIC | Age: 31
End: 2021-11-02
Payer: COMMERCIAL

## 2021-11-02 VITALS
OXYGEN SATURATION: 99 % | DIASTOLIC BLOOD PRESSURE: 69 MMHG | WEIGHT: 134 LBS | TEMPERATURE: 98.4 F | SYSTOLIC BLOOD PRESSURE: 111 MMHG | HEIGHT: 61 IN | BODY MASS INDEX: 25.3 KG/M2 | HEART RATE: 82 BPM

## 2021-11-02 DIAGNOSIS — R05.9 COUGH: Primary | ICD-10-CM

## 2021-11-02 DIAGNOSIS — M79.7 FIBROMYALGIA: ICD-10-CM

## 2021-11-02 DIAGNOSIS — G89.29 CHRONIC NECK PAIN: ICD-10-CM

## 2021-11-02 DIAGNOSIS — M54.2 CHRONIC NECK PAIN: ICD-10-CM

## 2021-11-02 DIAGNOSIS — F39 MOOD DISORDER (H): ICD-10-CM

## 2021-11-02 PROCEDURE — 99214 OFFICE O/P EST MOD 30 MIN: CPT | Performed by: FAMILY MEDICINE

## 2021-11-02 PROCEDURE — U0003 INFECTIOUS AGENT DETECTION BY NUCLEIC ACID (DNA OR RNA); SEVERE ACUTE RESPIRATORY SYNDROME CORONAVIRUS 2 (SARS-COV-2) (CORONAVIRUS DISEASE [COVID-19]), AMPLIFIED PROBE TECHNIQUE, MAKING USE OF HIGH THROUGHPUT TECHNOLOGIES AS DESCRIBED BY CMS-2020-01-R: HCPCS | Performed by: FAMILY MEDICINE

## 2021-11-02 PROCEDURE — U0005 INFEC AGEN DETEC AMPLI PROBE: HCPCS | Performed by: FAMILY MEDICINE

## 2021-11-02 RX ORDER — GABAPENTIN 600 MG/1
600 TABLET ORAL 3 TIMES DAILY
Qty: 90 TABLET | Refills: 2 | Status: SHIPPED | OUTPATIENT
Start: 2021-11-02 | End: 2022-03-28

## 2021-11-02 ASSESSMENT — ANXIETY QUESTIONNAIRES
GAD7 TOTAL SCORE: 21
7. FEELING AFRAID AS IF SOMETHING AWFUL MIGHT HAPPEN: NEARLY EVERY DAY
3. WORRYING TOO MUCH ABOUT DIFFERENT THINGS: NEARLY EVERY DAY
2. NOT BEING ABLE TO STOP OR CONTROL WORRYING: NEARLY EVERY DAY
5. BEING SO RESTLESS THAT IT IS HARD TO SIT STILL: NEARLY EVERY DAY
6. BECOMING EASILY ANNOYED OR IRRITABLE: NEARLY EVERY DAY
1. FEELING NERVOUS, ANXIOUS, OR ON EDGE: NEARLY EVERY DAY

## 2021-11-02 ASSESSMENT — PATIENT HEALTH QUESTIONNAIRE - PHQ9
5. POOR APPETITE OR OVEREATING: NEARLY EVERY DAY
SUM OF ALL RESPONSES TO PHQ QUESTIONS 1-9: 21

## 2021-11-02 ASSESSMENT — MIFFLIN-ST. JEOR: SCORE: 1265.2

## 2021-11-02 NOTE — PROGRESS NOTES
"SUBJECTIVE:  Beryl Valenzuela, a 31 year old female scheduled an appointment to discuss the following issues:  Follow-up pain/fibromyalgia/neck pain. No p.t.. no herniated disc.   Seeing dora. Needs refill gabapentin and zanaflex. No side effects.   Emotionally doing ok.   Single. 9,15month old.   No dating. No currently working. Sleep hit/miss. No ALCOHOL or illicit drugs. Limited caffeine. Active/exericse.   Mild cough - would covid test. No fevers or chills.     Medical, social, surgical, and family histories reviewed.    ROS:  All other ROS negative    OBJECTIVE:  /69   Pulse 82   Temp 98.4  F (36.9  C) (Tympanic)   Ht 1.549 m (5' 1\")   Wt 60.8 kg (134 lb)   LMP  (LMP Unknown)   SpO2 99%   BMI 25.32 kg/m    EXAM:  GENERAL APPEARANCE: healthy, alert and no distress  NECK: no adenopathy, no asymmetry, masses, or scars and thyroid normal to palpation  NECK:tight nape muscles  RESP: lungs clear to auscultation - no rales, rhonchi or wheezes  CV: regular rates and rhythm, normal S1 S2, no S3 or S4 and no murmur, click or rub -  ABDOMEN:  soft, nontender, no HSM or masses and bowel sounds normal  MS: extremities normal- no gross deformities noted, no evidence of inflammation in joints, FROM in all extremities.  NEURO: Normal strength and tone, sensory exam grossly normal, mentation intact and speech normal  PSYCH: mentation appears normal and affect normal/bright  :  ASSESSMENT / PLAN:  (R05.9) Cough  (primary encounter diagnosis)  Comment: viral uri vs ? Lungs clear  Plan: Symptomatic COVID-19 Virus (Coronavirus) by PCR        Nose        Await covid. Return to clinic if worse/not improving overall in next week.     (M79.7) Fibromyalgia  Comment: stable  Plan: continue meds    (F39) Mood disorder (H)  Comment: stable  Plan: per psych. Continue avoid ALCOHOL and illicit drugs. If SUICIAL IDEATION OR HOMOCIDAL IDEATION OR JUAN TO ER    (M54.2,  G89.29) Chronic neck pain  Comment: stable  Plan: " gabapentin (NEURONTIN) 600 MG tablet,         tiZANidine (ZANAFLEX) 4 MG tablet        Reveiwed risks and side effects of medication  Avoid ALCOHOL   Offered birth control - can't take if pregnant/trying to get pregnant.   Jamison Greene MD

## 2021-11-03 LAB — SARS-COV-2 RNA RESP QL NAA+PROBE: NEGATIVE

## 2021-11-03 ASSESSMENT — ANXIETY QUESTIONNAIRES: GAD7 TOTAL SCORE: 21

## 2021-11-09 ENCOUNTER — TELEPHONE (OUTPATIENT)
Dept: FAMILY MEDICINE | Facility: CLINIC | Age: 31
End: 2021-11-09
Payer: COMMERCIAL

## 2021-11-09 NOTE — TELEPHONE ENCOUNTER
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
## 2022-03-24 ENCOUNTER — VIRTUAL VISIT (OUTPATIENT)
Dept: PSYCHIATRY | Facility: CLINIC | Age: 32
End: 2022-03-24
Attending: PSYCHIATRY & NEUROLOGY
Payer: COMMERCIAL

## 2022-03-24 DIAGNOSIS — F90.9 ATTENTION DEFICIT HYPERACTIVITY DISORDER (ADHD), UNSPECIFIED ADHD TYPE: ICD-10-CM

## 2022-03-24 DIAGNOSIS — F41.1 GAD (GENERALIZED ANXIETY DISORDER): Primary | ICD-10-CM

## 2022-03-24 DIAGNOSIS — F43.10 PTSD (POST-TRAUMATIC STRESS DISORDER): ICD-10-CM

## 2022-03-24 DIAGNOSIS — F60.89 CLUSTER B PERSONALITY DISORDER (H): ICD-10-CM

## 2022-03-24 DIAGNOSIS — F39 MOOD DISORDER (H): ICD-10-CM

## 2022-03-24 PROCEDURE — 90792 PSYCH DIAG EVAL W/MED SRVCS: CPT | Mod: GT | Performed by: STUDENT IN AN ORGANIZED HEALTH CARE EDUCATION/TRAINING PROGRAM

## 2022-03-24 RX ORDER — BUSPIRONE HYDROCHLORIDE 15 MG/1
7.5 TABLET ORAL 2 TIMES DAILY
Qty: 60 TABLET | Refills: 0 | Status: SHIPPED | OUTPATIENT
Start: 2022-03-24 | End: 2022-04-26

## 2022-03-24 RX ORDER — BUPROPION HYDROCHLORIDE 150 MG/1
450 TABLET ORAL EVERY MORNING
Qty: 90 TABLET | Refills: 0 | Status: SHIPPED | OUTPATIENT
Start: 2022-03-24 | End: 2022-04-26

## 2022-03-24 RX ORDER — MIRTAZAPINE 15 MG/1
45 TABLET, FILM COATED ORAL AT BEDTIME
Qty: 90 TABLET | Refills: 0 | Status: SHIPPED | OUTPATIENT
Start: 2022-03-24 | End: 2022-04-26

## 2022-03-24 RX ORDER — DEXTROAMPHETAMINE SACCHARATE, AMPHETAMINE ASPARTATE MONOHYDRATE, DEXTROAMPHETAMINE SULFATE AND AMPHETAMINE SULFATE 7.5; 7.5; 7.5; 7.5 MG/1; MG/1; MG/1; MG/1
30 CAPSULE, EXTENDED RELEASE ORAL DAILY
Qty: 30 CAPSULE | Refills: 0 | Status: SHIPPED | OUTPATIENT
Start: 2022-03-24 | End: 2022-04-26

## 2022-03-24 NOTE — PATIENT INSTRUCTIONS
Buspirone: 7.5mg twice a day  Mirtazapine: increase to 45mg daily    April 26th @ 10am    **For crisis resources, please see the information at the end of this document**     Patient Education    Thank you for coming to the [facility] [place] [facility].    Lab Testing:  If you had lab testing today and your results are reassuring or normal they will be mailed to you or sent through farmaciamarket within 7 days. If the lab tests need quick action we will call you with the results. The phone number we will call with results is # [phone]. If this is not the best number please call our clinic and change the number.     Medication Refills:  If you need any refills please call your pharmacy and they will contact us. Our fax number for refills is [phone]. Please allow three business days for refill processing.   If you need to change to a different pharmacy, please contact the new pharmacy directly. The new pharmacy will help you get your medications transferred.     Contact Us:  Please call [phone] during business hours (8-5:00 M-F).  If you have medication related questions after clinic hours, or on the weekend, please call [phone].    Financial Assistance [phone]  Medical Records [phone]       MENTAL HEALTH CRISIS RESOURCES:  For a emergency help, please call [phone] or go to the nearest Emergency Department.     Emergency Walk-In Options:   EmPATH Unit @ [facility] ([place]): [phone] - Specialized mental health emergency area designed to be calming  [facility] ([place]): [phone]  [facility] ([place]): [phone]  [facility]): [phone]    County Crisis Information:   [place]: [phone]  [place]: [phone]  [place] ([place]) - Adult: [phone]     Child: [phone]  [place] - Adult: [phone]     Child: [phone]  [place]: [phone]  List of all [facility]  resources:   https://[place].gov/dhs/people-we-serve/adults/health-care/mental-health/resources/crisis-contacts.jsp    National Crisis Information:   Crisis Text Line: Text  [place]  to 351209  National Suicide Prevention Lifeline: 7-096-330-TALK (1-302.840.8794)       For online chat options, visit https://suicidepreventionlifeline.org/chat/  Poison Control Center: 6-309-134-1598  Trans Lifeline: 0-280-799-4915 - Hotline for transgender people of all ages  The [employer] Project: 3-298-100-2128 - Hotline for LGBT youth     For Non-Emergency Support:   Fast Tracker: Mental Health & Substance Use Disorder Resources -   https://www.[employer].org/

## 2022-03-25 ENCOUNTER — MYC MEDICAL ADVICE (OUTPATIENT)
Dept: PSYCHIATRY | Facility: CLINIC | Age: 32
End: 2022-03-25
Payer: COMMERCIAL

## 2022-04-07 DIAGNOSIS — F41.1 GAD (GENERALIZED ANXIETY DISORDER): Primary | ICD-10-CM

## 2022-04-07 DIAGNOSIS — M35.7 HYPERMOBILITY SYNDROME: ICD-10-CM

## 2022-04-07 RX ORDER — GABAPENTIN 300 MG/1
300 CAPSULE ORAL 3 TIMES DAILY
Qty: 90 CAPSULE | Refills: 0 | Status: SHIPPED | OUTPATIENT
Start: 2022-04-07 | End: 2022-04-26

## 2022-04-11 ENCOUNTER — VIRTUAL VISIT (OUTPATIENT)
Dept: FAMILY MEDICINE | Facility: CLINIC | Age: 32
End: 2022-04-11
Payer: COMMERCIAL

## 2022-04-11 DIAGNOSIS — M54.2 CHRONIC NECK PAIN: Primary | ICD-10-CM

## 2022-04-11 DIAGNOSIS — G89.29 CHRONIC NECK PAIN: Primary | ICD-10-CM

## 2022-04-11 DIAGNOSIS — E44.1 MILD MALNUTRITION (H): ICD-10-CM

## 2022-04-11 PROCEDURE — 99214 OFFICE O/P EST MOD 30 MIN: CPT | Mod: 95 | Performed by: FAMILY MEDICINE

## 2022-04-11 NOTE — PROGRESS NOTES
Beryl is a 31 year old who is being evaluated via a billable video visit.      How would you like to obtain your AVS? MyChart  If the video visit is dropped, the invitation should be resent by: Text to cell phone: [phone]  Will anyone else be joining your video visit? No    Video Start Time: 5:32 PM    ASSESSMENT / PLAN:  (M54.2,  G89.29) Chronic neck pain  Comment: stable  Plan: tiZANidine (ZANAFLEX) 4 MG tablet        We discussed potential side effects of zanaflex with other meds but patient on for months without issues of fatigue/ LIGHTHEADED/etc. Continue monitor. Exercise and limit simple carbs in diet    (E44.1) Mild malnutrition (H)  Comment: off herion - improving  Plan: Prenatal Vit-Fe Fumarate-FA (PNV FOLIC ACID +         IRON) 27-1 MG TABS        Patient would like PNV. Balanced diet/meat. Recheck in 3 months  Discuss birth control or planB. Not sexually active. Discussed dangers of most of her meds and pregnancy. Call/email with questions/concerns         Subjective   Beryl is a 31 year old who presents for the following health issues   Follow-up pain/fibromyalgia/neck pain- joint hypermoblitiy.   Taking 4mg TID - not sedating. No LIGHTHEADED. No blood pressure cuff. No smart watch. No chest pain or shortness of breath. Psychiatry knows patient on these meds.   Exercising. No SUICIAL IDEATION OR HOMOCIDAL IDEATION OR JUAN.    - Patient has an appointment to discuss getting a refill on Zanaflex. Patient has renewed all other medications with her psych doctor.     LILA Chong    HPI     }    Review of Systems   All other ROS negative      Objective           Vitals:  No vitals were obtained today due to virtual visit.    Physical Exam   GENERAL: Healthy, alert and no distress  EYES: Eyes grossly normal to inspection.  No discharge or erythema, or obvious scleral/conjunctival abnormalities.  RESP: No audible wheeze, cough, or visible cyanosis.  No visible retractions or increased work of  breathing.    SKIN: Visible skin clear. No significant rash, abnormal pigmentation or lesions.  NEURO: Cranial nerves grossly intact.  Mentation and speech appropriate for age.  PSYCH: Mentation appears normal, affect normal/bright, judgement and insight intact, normal speech and appearance well-groomed.      Video-Visit Details    Type of service:  Video Visit    Video End Time:5:42 PM    Originating Location (pt. Location): Home    Distant Location (provider location):  [facility]     Platform used for Video Visit: Tereza

## 2022-04-12 ENCOUNTER — MYC MEDICAL ADVICE (OUTPATIENT)
Dept: FAMILY MEDICINE | Facility: CLINIC | Age: 32
End: 2022-04-12
Payer: COMMERCIAL

## 2022-04-12 DIAGNOSIS — G43.809 OTHER MIGRAINE WITHOUT STATUS MIGRAINOSUS, NOT INTRACTABLE: Primary | ICD-10-CM

## 2022-04-12 RX ORDER — SUMATRIPTAN 50 MG/1
TABLET, FILM COATED ORAL
COMMUNITY
Start: 2021-09-26 | End: 2022-04-12

## 2022-04-12 NOTE — TELEPHONE ENCOUNTER
Routing refill request to provider for review/approval because:  Medication is reported/historical  Vivi Castillo BSN, RN

## 2022-04-13 RX ORDER — SUMATRIPTAN 50 MG/1
TABLET, FILM COATED ORAL
Qty: 18 TABLET | Refills: 0 | Status: SHIPPED | OUTPATIENT
Start: 2022-04-13 | End: 2022-07-11
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
## 2022-04-26 ENCOUNTER — VIRTUAL VISIT (OUTPATIENT)
Dept: PSYCHIATRY | Facility: CLINIC | Age: 32
End: 2022-04-26
Attending: PSYCHIATRY & NEUROLOGY
Payer: COMMERCIAL

## 2022-04-26 DIAGNOSIS — F90.9 ATTENTION DEFICIT HYPERACTIVITY DISORDER (ADHD), UNSPECIFIED ADHD TYPE: ICD-10-CM

## 2022-04-26 DIAGNOSIS — K59.00 CONSTIPATION, UNSPECIFIED CONSTIPATION TYPE: ICD-10-CM

## 2022-04-26 DIAGNOSIS — F41.1 GAD (GENERALIZED ANXIETY DISORDER): Primary | ICD-10-CM

## 2022-04-26 DIAGNOSIS — M35.7 HYPERMOBILITY SYNDROME: ICD-10-CM

## 2022-04-26 PROCEDURE — 99214 OFFICE O/P EST MOD 30 MIN: CPT | Mod: GT | Performed by: STUDENT IN AN ORGANIZED HEALTH CARE EDUCATION/TRAINING PROGRAM

## 2022-04-26 RX ORDER — DEXTROAMPHETAMINE SACCHARATE, AMPHETAMINE ASPARTATE, DEXTROAMPHETAMINE SULFATE AND AMPHETAMINE SULFATE 7.5; 7.5; 7.5; 7.5 MG/1; MG/1; MG/1; MG/1
30 TABLET ORAL DAILY
Qty: 30 TABLET | Refills: 0 | Status: SHIPPED | OUTPATIENT
Start: 2022-05-27 | End: 2022-05-31

## 2022-04-26 RX ORDER — IBUPROFEN 600 MG/1
600 TABLET, FILM COATED ORAL EVERY 6 HOURS PRN
Qty: 60 TABLET | Refills: 0 | Status: SHIPPED | OUTPATIENT
Start: 2022-04-26 | End: 2022-05-24

## 2022-04-26 RX ORDER — HYDROXYZINE PAMOATE 50 MG/1
50 CAPSULE ORAL 2 TIMES DAILY PRN
Qty: 60 CAPSULE | Refills: 1 | Status: SHIPPED | OUTPATIENT
Start: 2022-04-26 | End: 2022-05-31

## 2022-04-26 RX ORDER — DEXTROAMPHETAMINE SACCHARATE, AMPHETAMINE ASPARTATE, DEXTROAMPHETAMINE SULFATE AND AMPHETAMINE SULFATE 7.5; 7.5; 7.5; 7.5 MG/1; MG/1; MG/1; MG/1
30 TABLET ORAL DAILY
Qty: 30 TABLET | Refills: 0 | Status: SHIPPED | OUTPATIENT
Start: 2022-04-26 | End: 2022-05-26

## 2022-04-26 RX ORDER — DEXTROAMPHETAMINE SACCHARATE, AMPHETAMINE ASPARTATE, DEXTROAMPHETAMINE SULFATE AND AMPHETAMINE SULFATE 7.5; 7.5; 7.5; 7.5 MG/1; MG/1; MG/1; MG/1
30 TABLET ORAL DAILY
Qty: 30 TABLET | Refills: 0 | Status: SHIPPED | OUTPATIENT
Start: 2022-06-27 | End: 2022-05-31

## 2022-04-26 RX ORDER — GUANFACINE 1 MG/1
TABLET ORAL
Qty: 60 TABLET | Refills: 1 | Status: SHIPPED | OUTPATIENT
Start: 2022-04-26 | End: 2022-05-31

## 2022-04-26 RX ORDER — DEXTROAMPHETAMINE SACCHARATE, AMPHETAMINE ASPARTATE, DEXTROAMPHETAMINE SULFATE AND AMPHETAMINE SULFATE 7.5; 7.5; 7.5; 7.5 MG/1; MG/1; MG/1; MG/1
30 TABLET ORAL DAILY
Qty: 30 TABLET | Refills: 0 | Status: CANCELLED | OUTPATIENT
Start: 2022-06-27 | End: 2022-07-27

## 2022-04-26 RX ORDER — DEXTROAMPHETAMINE SACCHARATE, AMPHETAMINE ASPARTATE, DEXTROAMPHETAMINE SULFATE AND AMPHETAMINE SULFATE 7.5; 7.5; 7.5; 7.5 MG/1; MG/1; MG/1; MG/1
30 TABLET ORAL DAILY
Qty: 30 TABLET | Refills: 0 | Status: CANCELLED | OUTPATIENT
Start: 2022-05-27 | End: 2022-06-26

## 2022-04-26 RX ORDER — MIRTAZAPINE 45 MG/1
45 TABLET, FILM COATED ORAL AT BEDTIME
Qty: 30 TABLET | Refills: 1 | Status: SHIPPED | OUTPATIENT
Start: 2022-04-26 | End: 2022-05-31

## 2022-04-26 RX ORDER — BUPROPION HYDROCHLORIDE 300 MG/1
300 TABLET ORAL EVERY MORNING
Qty: 30 TABLET | Refills: 1 | Status: SHIPPED | OUTPATIENT
Start: 2022-04-26 | End: 2022-05-31

## 2022-04-26 RX ORDER — DEXTROAMPHETAMINE SACCHARATE, AMPHETAMINE ASPARTATE, DEXTROAMPHETAMINE SULFATE AND AMPHETAMINE SULFATE 7.5; 7.5; 7.5; 7.5 MG/1; MG/1; MG/1; MG/1
30 TABLET ORAL DAILY
Qty: 30 TABLET | Refills: 0 | Status: CANCELLED | OUTPATIENT
Start: 2022-04-26 | End: 2022-05-26

## 2022-04-26 RX ORDER — ACETAMINOPHEN 500 MG
500 TABLET ORAL EVERY 6 HOURS PRN
Qty: 60 TABLET | Refills: 1 | Status: SHIPPED | OUTPATIENT
Start: 2022-04-26 | End: 2022-05-31

## 2022-04-26 RX ORDER — BUPROPION HYDROCHLORIDE 150 MG/1
150 TABLET ORAL EVERY MORNING
Qty: 30 TABLET | Refills: 1 | Status: SHIPPED | OUTPATIENT
Start: 2022-04-26 | End: 2022-05-31

## 2022-04-26 ASSESSMENT — ANXIETY QUESTIONNAIRES
7. FEELING AFRAID AS IF SOMETHING AWFUL MIGHT HAPPEN: NEARLY EVERY DAY
1. FEELING NERVOUS, ANXIOUS, OR ON EDGE: NEARLY EVERY DAY
2. NOT BEING ABLE TO STOP OR CONTROL WORRYING: NEARLY EVERY DAY
GAD7 TOTAL SCORE: 21
4. TROUBLE RELAXING: NEARLY EVERY DAY
GAD7 TOTAL SCORE: 21
GAD7 TOTAL SCORE: 21
3. WORRYING TOO MUCH ABOUT DIFFERENT THINGS: NEARLY EVERY DAY
6. BECOMING EASILY ANNOYED OR IRRITABLE: NEARLY EVERY DAY
5. BEING SO RESTLESS THAT IT IS HARD TO SIT STILL: NEARLY EVERY DAY
7. FEELING AFRAID AS IF SOMETHING AWFUL MIGHT HAPPEN: NEARLY EVERY DAY

## 2022-04-26 ASSESSMENT — PATIENT HEALTH QUESTIONNAIRE - PHQ9
SUM OF ALL RESPONSES TO PHQ QUESTIONS 1-9: 23
SUM OF ALL RESPONSES TO PHQ QUESTIONS 1-9: 23
10. IF YOU CHECKED OFF ANY PROBLEMS, HOW DIFFICULT HAVE THESE PROBLEMS MADE IT FOR YOU TO DO YOUR WORK, TAKE CARE OF THINGS AT HOME, OR GET ALONG WITH OTHER PEOPLE: EXTREMELY DIFFICULT

## 2022-04-26 NOTE — PROGRESS NOTES
Beryl Valenzuela is a 31 year old who has consented to receive services via billable video visit.      Pt will join video visit via: [employer]  If there are problems joining the visit, send backup video invite via: Text to preferred phone: [phone]      Originating Location (patient location): Patient's home  Distant Location (provider location): [facility] [place] [facility]    Will anyone else be joining the video visit? No    How would you prefer to obtain AVS?: [employer]  Answers for HPI/ROS submitted by the patient on 4/26/2022  If you checked off any problems, how difficult have these problems made it for you to do your work, take care of things at home, or get along with other people?: Extremely difficult  PHQ9 TOTAL SCORE: 23  JEREMY 7 TOTAL SCORE: 21

## 2022-04-26 NOTE — LETTER
April 26, 2022       Re: Beryl Valenzuela  [address] [address] [id number]       To whom it may concern,    I have seen Beryl in our clinic since 3/24/22 for psychiatric care. Last visit was today (4/26/22). She currently meets criteria for PTSD (Post Traumatic Stress Disorder), ADHD, Generalized Anxiety Disorder with panic, Unspecified Depressive Disorder, as well as Opioid Use Disorder in early remission (is on agonist therapy, Suboxone, with a different provider). I am currently prescribing her the following medications for the above diagnoses: Adderall (30mg), Bupropion XL 450mg, Mirtazapine 45mg. Today we started a new prescription for Guanfacine  and Hydroxyzine, and discontinued prescription for Gabapentin and Buspirone.     She reported her last use of heroin was in November 2021, and since this time has completed residential treatment ([facility]), and is currently engaged in outpatient programming for chemical dependency and mental health at [facility]. Also is engaged in individual psychotherapy.     Please reach out if there are any questions or concerns regarding Beryl's psychiatric care.      Sincerely,      Unique Taylor MD

## 2022-04-26 NOTE — PATIENT INSTRUCTIONS
Guanfacine: start taking 1mg at bedtime, then increase to 1mg twice a day (once in the morning, once in the evening) as long as it isn't too sedating    Magnesium citrate - will send this for constipation, may also be helpful for anxiety. Please take at nighttime    Referral for therapy through out clinic - I will place this today    Please ask your family doctor to refill ibuprofen + tylenol going forward, I will put in for one month refill right now    I will send letter to your email, if you don't get it by tomorrow late morning let me know     Follow-up visit May 31st @ 9:30am    **For crisis resources, please see the information at the end of this document**   Patient Education    Thank you for coming to the [facility] MENTAL HEALTH & ADDICTION [place] CLINIC.    Lab Testing:  If you had lab testing today and your results are reassuring or normal they will be mailed to you or sent through [employer] within 7 days. If the lab tests need quick action we will call you with the results. The phone number we will call with results is # [phone]. If this is not the best number please call our clinic and change the number.     Medication Refills:  If you need any refills please call your pharmacy and they will contact us. Our fax number for refills is [phone]. Please allow three business days for refill processing.   If you need to change to a different pharmacy, please contact the new pharmacy directly. The new pharmacy will help you get your medications transferred.     Contact Us:  Please call [phone] during business hours (8-5:00 M-F).  If you have medication related questions after clinic hours, or on the weekend, please call [phone].    Financial Assistance [phone]  Medical Records [phone]       [facility] CRISIS RESOURCES:  For a emergency help, please call 911 or go to the nearest Emergency Department.     Emergency Walk-In Options:   EmPATH Unit @ [facility]  ([place]): [phone] - Specialized mental health emergency area designed to be calming  [facility] ([place]): [phone]  [facility] ([place]): [phone]  [facility] ([place]): [phone]    [facility] Crisis Information:   [place]: [phone]  [place]: [phone]  [place] (REJI) - Adult: [phone]     Child: [phone]  [place] - Adult: [phone]     Child: [phone]  [place]: [phone]  List of all [facility] resources:   https://mn.[facility]/dhs/people-we-serve/adults/health-care/mental-health/resources/crisis-contacts.jsp    National Crisis Information:   Crisis Text Line: Text  [place]  to 049580  National Suicide Prevention Lifeline: 0-551-079-TALK (1-807.653.2630)       For online chat options, visit https://suicidepreventionlifeline.org/chat/  Poison Control Center: 1-332.756.9501  Trans Lifeline: 1-102.266.7371 - Hotline for transgender people of all ages  The Sergio Project: 1-141.259.7053 - Hotline for LGBT youth     For Non-Emergency Support:   Fast Tracker: Mental Health & Substance Use Disorder Resources -   https://www.[employer].org/

## 2022-04-26 NOTE — Clinical Note
Good discussion of the alternatives proposed to the patient and agree with your plan.  Wondering if psych testing could help with diagnostic clarity--we can discuss in clinic or supervision.  L

## 2022-04-27 ENCOUNTER — MYC MEDICAL ADVICE (OUTPATIENT)
Dept: PSYCHIATRY | Facility: CLINIC | Age: 32
End: 2022-04-27
Payer: COMMERCIAL

## 2022-04-27 DIAGNOSIS — K59.03 DRUG-INDUCED CONSTIPATION: Primary | ICD-10-CM

## 2022-04-27 ASSESSMENT — PATIENT HEALTH QUESTIONNAIRE - PHQ9: SUM OF ALL RESPONSES TO PHQ QUESTIONS 1-9: 23

## 2022-04-27 ASSESSMENT — ANXIETY QUESTIONNAIRES: GAD7 TOTAL SCORE: 21

## 2022-04-28 NOTE — TELEPHONE ENCOUNTER
Patient sent a [employer] message following up on medication for constipation and anxiety. Plan from visit on 4/26/22 indicated that magnesium citrate 200 mg Q HS was recommended for constipation and anxiety. Rx was sent on 4/26/22, along with all other prescriptions. Will recommend that patient contact pharmacy to follow up.

## 2022-04-29 ENCOUNTER — MYC MEDICAL ADVICE (OUTPATIENT)
Dept: PSYCHIATRY | Facility: CLINIC | Age: 32
End: 2022-04-29
Payer: COMMERCIAL

## 2022-04-29 NOTE — TELEPHONE ENCOUNTER
Patient requested that Rx for magnesium citrate be transferred to [facility]. Provided education on the process of transferring prescriptions for non-controlled medications.

## 2022-05-07 ENCOUNTER — HEALTH MAINTENANCE LETTER (OUTPATIENT)
Age: 32
End: 2022-05-07

## 2022-05-24 DIAGNOSIS — M35.7 HYPERMOBILITY SYNDROME: ICD-10-CM

## 2022-05-24 RX ORDER — IBUPROFEN 600 MG/1
600 TABLET, FILM COATED ORAL EVERY 6 HOURS PRN
Qty: 60 TABLET | Refills: 0 | Status: SHIPPED | OUTPATIENT
Start: 2022-05-24 | End: 2022-05-31

## 2022-05-31 ENCOUNTER — VIRTUAL VISIT (OUTPATIENT)
Dept: PSYCHIATRY | Facility: CLINIC | Age: 32
End: 2022-05-31
Attending: PSYCHIATRY & NEUROLOGY
Payer: COMMERCIAL

## 2022-05-31 DIAGNOSIS — F43.10 PTSD (POST-TRAUMATIC STRESS DISORDER): ICD-10-CM

## 2022-05-31 DIAGNOSIS — F39 MOOD DISORDER (H): ICD-10-CM

## 2022-05-31 DIAGNOSIS — M35.7 HYPERMOBILITY SYNDROME: ICD-10-CM

## 2022-05-31 DIAGNOSIS — F90.9 ATTENTION DEFICIT HYPERACTIVITY DISORDER (ADHD), UNSPECIFIED ADHD TYPE: ICD-10-CM

## 2022-05-31 DIAGNOSIS — F41.1 GAD (GENERALIZED ANXIETY DISORDER): Primary | ICD-10-CM

## 2022-05-31 PROCEDURE — 99214 OFFICE O/P EST MOD 30 MIN: CPT | Mod: GT | Performed by: STUDENT IN AN ORGANIZED HEALTH CARE EDUCATION/TRAINING PROGRAM

## 2022-05-31 RX ORDER — DEXTROAMPHETAMINE SACCHARATE, AMPHETAMINE ASPARTATE MONOHYDRATE, DEXTROAMPHETAMINE SULFATE AND AMPHETAMINE SULFATE 7.5; 7.5; 7.5; 7.5 MG/1; MG/1; MG/1; MG/1
30 CAPSULE, EXTENDED RELEASE ORAL DAILY
Qty: 30 CAPSULE | Refills: 0 | Status: SHIPPED | OUTPATIENT
Start: 2022-06-23 | End: 2022-06-27

## 2022-05-31 RX ORDER — BUPROPION HYDROCHLORIDE 300 MG/1
300 TABLET ORAL EVERY MORNING
Qty: 30 TABLET | Refills: 1 | Status: SHIPPED | OUTPATIENT
Start: 2022-05-31 | End: 2022-06-27

## 2022-05-31 RX ORDER — ACETAMINOPHEN 500 MG
500 TABLET ORAL EVERY 6 HOURS PRN
Qty: 60 TABLET | Refills: 1 | Status: SHIPPED | OUTPATIENT
Start: 2022-05-31 | End: 2022-06-21

## 2022-05-31 RX ORDER — BUPROPION HYDROCHLORIDE 150 MG/1
150 TABLET ORAL EVERY MORNING
Qty: 30 TABLET | Refills: 1 | Status: SHIPPED | OUTPATIENT
Start: 2022-05-31 | End: 2022-06-27

## 2022-05-31 RX ORDER — IBUPROFEN 600 MG/1
600 TABLET, FILM COATED ORAL EVERY 6 HOURS PRN
Qty: 60 TABLET | Refills: 1 | Status: SHIPPED | OUTPATIENT
Start: 2022-05-31 | End: 2022-06-21

## 2022-05-31 RX ORDER — MIRTAZAPINE 45 MG/1
45 TABLET, FILM COATED ORAL AT BEDTIME
Qty: 30 TABLET | Refills: 1 | Status: SHIPPED | OUTPATIENT
Start: 2022-05-31 | End: 2022-06-27

## 2022-05-31 RX ORDER — GUANFACINE 1 MG/1
TABLET ORAL
Qty: 120 TABLET | Refills: 1 | Status: SHIPPED | OUTPATIENT
Start: 2022-05-31 | End: 2022-06-27

## 2022-05-31 RX ORDER — HYDROXYZINE PAMOATE 50 MG/1
50 CAPSULE ORAL 3 TIMES DAILY PRN
Qty: 60 CAPSULE | Refills: 1 | Status: SHIPPED | OUTPATIENT
Start: 2022-05-31 | End: 2022-06-27

## 2022-05-31 ASSESSMENT — ANXIETY QUESTIONNAIRES
8. IF YOU CHECKED OFF ANY PROBLEMS, HOW DIFFICULT HAVE THESE MADE IT FOR YOU TO DO YOUR WORK, TAKE CARE OF THINGS AT HOME, OR GET ALONG WITH OTHER PEOPLE?: VERY DIFFICULT
GAD7 TOTAL SCORE: 15
GAD7 TOTAL SCORE: 15
4. TROUBLE RELAXING: MORE THAN HALF THE DAYS
6. BECOMING EASILY ANNOYED OR IRRITABLE: SEVERAL DAYS
7. FEELING AFRAID AS IF SOMETHING AWFUL MIGHT HAPPEN: NEARLY EVERY DAY
5. BEING SO RESTLESS THAT IT IS HARD TO SIT STILL: SEVERAL DAYS
2. NOT BEING ABLE TO STOP OR CONTROL WORRYING: MORE THAN HALF THE DAYS
1. FEELING NERVOUS, ANXIOUS, OR ON EDGE: NEARLY EVERY DAY
7. FEELING AFRAID AS IF SOMETHING AWFUL MIGHT HAPPEN: NEARLY EVERY DAY
3. WORRYING TOO MUCH ABOUT DIFFERENT THINGS: NEARLY EVERY DAY
GAD7 TOTAL SCORE: 15

## 2022-05-31 ASSESSMENT — PATIENT HEALTH QUESTIONNAIRE - PHQ9
10. IF YOU CHECKED OFF ANY PROBLEMS, HOW DIFFICULT HAVE THESE PROBLEMS MADE IT FOR YOU TO DO YOUR WORK, TAKE CARE OF THINGS AT HOME, OR GET ALONG WITH OTHER PEOPLE: SOMEWHAT DIFFICULT
SUM OF ALL RESPONSES TO PHQ QUESTIONS 1-9: 12
SUM OF ALL RESPONSES TO PHQ QUESTIONS 1-9: 12

## 2022-05-31 NOTE — PROGRESS NOTES
Beryl Valenzuela is a 32 year old who has consented to receive services via billable video visit.      Pt will join video visit via: [employer]  If there are problems joining the visit, send backup video invite via: Send to preferred e-mail: [email or web address]      Originating Location (patient location): Patient's home  Distant Location (provider location): [facility] MENTAL HEALTH & ADDICTION [place] CLINIC    Will anyone else be joining the video visit? No    How would you prefer to obtain AVS?: Saskia
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
## 2022-05-31 NOTE — PATIENT INSTRUCTIONS
Hydroxyzine: increase to 50mg three times a day as needed     Guanfacine: increase to 3mg daily for one week. Then increase to 4mg daily if you are not having side effects     I will reach out to social workers here in our clinic to talk to you about SSDI    June 27th @ 9am- 60min appointment     **For crisis resources, please see the information at the end of this document**   Patient Education    Thank you for coming to the [facility] MENTAL HEALTH & ADDICTION [place] CLINIC.    Lab Testing:  If you had lab testing today and your results are reassuring or normal they will be mailed to you or sent through [employer] within 7 days. If the lab tests need quick action we will call you with the results. The phone number we will call with results is # [phone]. If this is not the best number please call our clinic and change the number.     Medication Refills:  If you need any refills please call your pharmacy and they will contact us. Our fax number for refills is [phone].   Three business days of notice are needed for general medication refill requests.   Five business days of notice are needed for controlled substance refill requests.   If you need to change to a different pharmacy, please contact the new pharmacy directly. The new pharmacy will help you get your medications transferred.     Contact Us:  Please call [phone] during business hours (8-5:00 M-F).  If you have medication related questions after clinic hours, or on the weekend, please call [phone].    Financial Assistance [phone]  Medical Records [phone]       MENTAL HEALTH CRISIS RESOURCES:  For a emergency help, please call 911 or go to the nearest Emergency Department.     Emergency Walk-In Options:   EmPATH Unit @ [facility] ([place]): [phone] - Specialized mental health emergency area designed to be calming  [facility] ([place]): [phone]  [facility]  ([place]): [phone]  [facility] ([place]): [phone]    [facility] Crisis Information:   [place]: [phone]  [place]: [phone]  [place] (REJI) - Adult: [phone]     Child: [phone]  [place] - Adult: [phone]     Child: [phone]  [place]: [phone]  List of all [facility] resources:   https://mn.[facility]/dhs/people-we-serve/adults/health-care/mental-health/resources/crisis-contacts.jsp    National Crisis Information:   Crisis Text Line: Text  [place]  to 594325  National Suicide Prevention Lifeline: 1-370-089-PFQQ (1-713.684.3223)       For online chat options, visit https://suicidepreventionlifeline.org/chat/  Poison Control Center: 1-820.754.2163  Trans Lifeline: 1-802.424.7896 - Hotline for transgender people of all ages  The Sergio Project: 0-059-526-7253 - Hotline for LGBT youth     For Non-Emergency Support:   Fast Tracker: Mental Health & Substance Use Disorder Resources -   https://www.LYCEEMn.org/

## 2022-06-20 ENCOUNTER — MYC MEDICAL ADVICE (OUTPATIENT)
Dept: FAMILY MEDICINE | Facility: CLINIC | Age: 32
End: 2022-06-20
Payer: COMMERCIAL

## 2022-06-20 DIAGNOSIS — M54.2 CHRONIC NECK PAIN: ICD-10-CM

## 2022-06-20 DIAGNOSIS — M35.7 HYPERMOBILITY SYNDROME: ICD-10-CM

## 2022-06-20 DIAGNOSIS — G89.29 CHRONIC NECK PAIN: ICD-10-CM

## 2022-06-21 RX ORDER — IBUPROFEN 600 MG/1
600 TABLET, FILM COATED ORAL EVERY 6 HOURS PRN
Qty: 60 TABLET | Refills: 1 | Status: SHIPPED | OUTPATIENT
Start: 2022-06-21 | End: 2022-07-11

## 2022-06-21 RX ORDER — ACETAMINOPHEN 500 MG
500 TABLET ORAL EVERY 6 HOURS PRN
Qty: 60 TABLET | Refills: 1 | Status: SHIPPED | OUTPATIENT
Start: 2022-06-21 | End: 2022-11-18

## 2022-06-21 NOTE — TELEPHONE ENCOUNTER
Reason for Call:  Medication or medication refill:    Do you use a [facility]?  Name of the pharmacy and phone number for the current request:  [id number] - [place] - [address] AT [facility]    Name of the medication requested: ibuprofen (ADVIL/MOTRIN) 600 MG tablet, tiZANidine (ZANAFLEX) 4 MG tablet, and acetaminophen (TYLENOL) 500 MG tablet    Other request: n/a    Can we leave a detailed message on this number? YES    Phone number patient can be reached at: Home number on file [phone] (home)    Best Time: Any    Call taken on 6/21/2022 at 6:52 AM by Matt Damian

## 2022-06-21 NOTE — TELEPHONE ENCOUNTER
"Next 5 appointments (look out 90 days)      Jul 11, 2022  8:00 AM  (Arrive by 7:40 AM)  Provider Visit with Jamison Greene MD  [facility] ([facility] ) [address] [facility] [address] [phone]          Requested Prescriptions   Pending Prescriptions Disp Refills    tiZANidine (ZANAFLEX) 4 MG tablet 90 tablet 2     Sig: Take 1 tablet (4 mg) by mouth 3 times daily as needed for muscle spasms        There is no refill protocol information for this order        ibuprofen (ADVIL/MOTRIN) 600 MG tablet 60 tablet 1     Sig: Take 1 tablet (600 mg) by mouth every 6 hours as needed for moderate pain        NSAID Medications Failed - 6/21/2022  7:18 AM        Failed - Normal ALT on file in past 12 months     No lab results found.            Failed - Normal AST on file in past 12 months     No lab results found.          Failed - Normal CBC on file in past 12 months     No lab results found.                Failed - Normal serum creatinine on file in past 12 months     No lab results found.    Ok to refill medication if creatinine is low          Passed - Blood pressure under 140/90 in past 12 months       BP Readings from Last 3 Encounters:   11/02/21 111/69   10/24/21 122/79   03/24/21 118/80                 Passed - Recent (12 mo) or future (30 days) visit within the authorizing provider's specialty     Patient has had an office visit with the authorizing provider or a provider within the authorizing providers department within the previous 12 mos or has a future within next 30 days. See \"Patient Info\" tab in inbasket, or \"Choose Columns\" in Meds & Orders section of the refill encounter.              Passed - Patient is age 6-64 years        Passed - Medication is active on med list        Passed - No active pregnancy on record        Passed - No positive pregnancy test in past 12 months           acetaminophen (TYLENOL) 500 MG tablet 60 tablet 1     Sig: Take 1 " "tablet (500 mg) by mouth every 6 hours as needed for pain        Analgesics (Non-Narcotic Tylenol and ASA Only) Passed - 6/21/2022  7:18 AM        Passed - Recent (12 mo) or future (30 days) visit within the authorizing provider's specialty     Patient has had an office visit with the authorizing provider or a provider within the authorizing providers department within the previous 12 mos or has a future within next 30 days. See \"Patient Info\" tab in inbasket, or \"Choose Columns\" in Meds & Orders section of the refill encounter.              Passed - Patient is 7 months old or older     If patient is a peds patient of the age 7 mos -12 years, ok to refill using weight-based dosing.     If >3g daily and/or sig is not \"prn\", check for liver enzymes. If normal in the last year, ok to refill.  If not, refer to the provider.          Passed - Medication is active on med list              "

## 2022-06-27 ENCOUNTER — VIRTUAL VISIT (OUTPATIENT)
Dept: PSYCHIATRY | Facility: CLINIC | Age: 32
End: 2022-06-27
Attending: PSYCHIATRY & NEUROLOGY
Payer: COMMERCIAL

## 2022-06-27 DIAGNOSIS — F90.9 ATTENTION DEFICIT HYPERACTIVITY DISORDER (ADHD), UNSPECIFIED ADHD TYPE: ICD-10-CM

## 2022-06-27 DIAGNOSIS — F41.1 GAD (GENERALIZED ANXIETY DISORDER): ICD-10-CM

## 2022-06-27 PROCEDURE — 99214 OFFICE O/P EST MOD 30 MIN: CPT | Mod: 95 | Performed by: STUDENT IN AN ORGANIZED HEALTH CARE EDUCATION/TRAINING PROGRAM

## 2022-06-27 RX ORDER — HYDROXYZINE PAMOATE 50 MG/1
50 CAPSULE ORAL 4 TIMES DAILY PRN
Qty: 60 CAPSULE | Refills: 1 | Status: SHIPPED | OUTPATIENT
Start: 2022-06-27 | End: 2022-07-05

## 2022-06-27 RX ORDER — DEXTROAMPHETAMINE SACCHARATE, AMPHETAMINE ASPARTATE MONOHYDRATE, DEXTROAMPHETAMINE SULFATE AND AMPHETAMINE SULFATE 7.5; 7.5; 7.5; 7.5 MG/1; MG/1; MG/1; MG/1
30 CAPSULE, EXTENDED RELEASE ORAL DAILY
Qty: 30 CAPSULE | Refills: 0 | Status: SHIPPED | OUTPATIENT
Start: 2022-07-22 | End: 2022-08-04

## 2022-06-27 RX ORDER — BUPROPION HYDROCHLORIDE 300 MG/1
300 TABLET ORAL EVERY MORNING
Qty: 30 TABLET | Refills: 1 | Status: SHIPPED | OUTPATIENT
Start: 2022-06-27 | End: 2022-08-04

## 2022-06-27 RX ORDER — GUANFACINE 2 MG/1
4 TABLET ORAL AT BEDTIME
Qty: 60 TABLET | Refills: 1 | Status: SHIPPED | OUTPATIENT
Start: 2022-06-27 | End: 2022-09-01

## 2022-06-27 RX ORDER — MIRTAZAPINE 45 MG/1
45 TABLET, FILM COATED ORAL AT BEDTIME
Qty: 30 TABLET | Refills: 1 | Status: SHIPPED | OUTPATIENT
Start: 2022-06-27 | End: 2022-08-04

## 2022-06-27 RX ORDER — BUPROPION HYDROCHLORIDE 150 MG/1
150 TABLET ORAL EVERY MORNING
Qty: 30 TABLET | Refills: 1 | Status: SHIPPED | OUTPATIENT
Start: 2022-06-27 | End: 2022-08-04

## 2022-06-27 NOTE — PROGRESS NOTES
Beryl Valenzuela is a 32 year old who has consented to receive services via billable video visit.      Pt will join video visit via: [employer]  If there are problems joining the visit, send backup video invite via: Text to preferred phone: [phone]      Originating Location (patient location): in [facility]  Distant Location (provider location): [facility]    Will anyone else be joining the video visit? No    How would you prefer to obtain AVS?: Saskia
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
## 2022-06-27 NOTE — PATIENT INSTRUCTIONS
Unique Taylor  [address] [facility] [address]  [phone]  Medical Records [phone]     **For crisis resources, please see the information at the end of this document**   Patient Education    Thank you for coming to the [facility] [place] [facility].     Lab Testing:  If you had lab testing today and your results are reassuring or normal they will be mailed to you or sent through Diana within 7 days. If the lab tests need quick action we will call you with the results. The phone number we will call with results is # [phone]. If this is not the best number please call our clinic and change the number.     Medication Refills:  If you need any refills please call your pharmacy and they will contact us. Our fax number for refills is [phone].   Three business days of notice are needed for general medication refill requests.   Five business days of notice are needed for controlled substance refill requests.   If you need to change to a different pharmacy, please contact the new pharmacy directly. The new pharmacy will help you get your medications transferred.     Contact Us:  Please call [phone] during business hours (8-5:00 M-F).   If you have medication related questions after clinic hours, or on the weekend, please call [phone].     Financial Assistance [phone]   Medical Records [phone]       MENTAL HEALTH CRISIS RESOURCES:  For a emergency help, please call 911 or go to the nearest Emergency Department.     Emergency Walk-In Options:   [facility] Unit @ [facility] ([place]): [phone] - Specialized mental health emergency area designed to be calming  [facility] ([place]): [phone]  [facility] ([place]): [phone]  [facility] ([place]): [phone]    [facility] Crisis Information:   [place]: [phone]  [place]: [phone]  [place] ([place]) - Adult: [phone]      Child: [phone]  [place] - Adult: [phone]     Child: [phone]  [place]: [phone]  List of all [facility] resources:   https://[place].gov/dhs/people-we-serve/adults/health-care/mental-health/resources/crisis-contacts.jsp    National Crisis Information:   Crisis Text Line: Text  [place]  to 074492  National Suicide Prevention Lifeline: 5-436-303-TALK (1-436.798.6911)       For online chat options, visit https://suicidepreventionlifeline.org/chat/  Poison Control Center: 0-331-790-3919  Trans Lifeline: 1-571.207.3023 - Hotline for transgender people of all ages  The [employer]: 1-666-364-7513 - Hotline for LGBT youth     For Non-Emergency Support:   Fast Tracker: Mental Health & Substance Use Disorder Resources -   https://www.[employer]lic[employer].org/

## 2022-06-28 DIAGNOSIS — M35.7 HYPERMOBILITY SYNDROME: ICD-10-CM

## 2022-06-28 DIAGNOSIS — F41.1 GAD (GENERALIZED ANXIETY DISORDER): Primary | ICD-10-CM

## 2022-06-28 RX ORDER — GABAPENTIN 300 MG/1
300 CAPSULE ORAL 3 TIMES DAILY PRN
Qty: 90 CAPSULE | Refills: 0 | Status: SHIPPED | OUTPATIENT
Start: 2022-06-28 | End: 2022-08-15

## 2022-07-04 ENCOUNTER — MYC MEDICAL ADVICE (OUTPATIENT)
Dept: PSYCHIATRY | Facility: CLINIC | Age: 32
End: 2022-07-04

## 2022-07-04 DIAGNOSIS — F41.1 GAD (GENERALIZED ANXIETY DISORDER): ICD-10-CM

## 2022-07-05 RX ORDER — HYDROXYZINE PAMOATE 50 MG/1
50 CAPSULE ORAL 4 TIMES DAILY PRN
Qty: 120 CAPSULE | Refills: 1 | Status: SHIPPED | OUTPATIENT
Start: 2022-07-05 | End: 2022-08-04

## 2022-07-07 NOTE — PROGRESS NOTES
{PROVIDER CHARTING PREFERENCE:213354}    Christo Cordoba is a 32 year old{ACCOMPANIED BY STATEMENT (Optional):118426}, presenting for the following health issues:  No chief complaint on file.      HPI     {SUPERLIST (Optional):157923}  {additonal problems for provider to add (Optional):050384}    Review of Systems   {ROS COMP (Optional):045608}      Objective    There were no vitals taken for this visit.  There is no height or weight on file to calculate BMI.  Physical Exam   {Exam List (Optional):844436}    {Diagnostic Test Results (Optional):276051}    {AMBULATORY ATTESTATION (Optional):212518}            .  ..

## 2022-07-11 ENCOUNTER — VIRTUAL VISIT (OUTPATIENT)
Dept: FAMILY MEDICINE | Facility: CLINIC | Age: 32
End: 2022-07-11
Payer: COMMERCIAL

## 2022-07-11 DIAGNOSIS — Z12.4 CERVICAL CANCER SCREENING: ICD-10-CM

## 2022-07-11 DIAGNOSIS — M35.7 HYPERMOBILITY SYNDROME: ICD-10-CM

## 2022-07-11 DIAGNOSIS — M54.2 CHRONIC NECK PAIN: Primary | ICD-10-CM

## 2022-07-11 DIAGNOSIS — G89.29 CHRONIC NECK PAIN: Primary | ICD-10-CM

## 2022-07-11 DIAGNOSIS — G43.809 OTHER MIGRAINE WITHOUT STATUS MIGRAINOSUS, NOT INTRACTABLE: ICD-10-CM

## 2022-07-11 PROCEDURE — 99214 OFFICE O/P EST MOD 30 MIN: CPT | Mod: 95 | Performed by: FAMILY MEDICINE

## 2022-07-11 RX ORDER — IBUPROFEN 600 MG/1
600 TABLET, FILM COATED ORAL EVERY 8 HOURS PRN
Qty: 100 TABLET | Refills: 1 | Status: SHIPPED | OUTPATIENT
Start: 2022-07-11 | End: 2022-10-17

## 2022-07-11 RX ORDER — SUMATRIPTAN 50 MG/1
TABLET, FILM COATED ORAL
Qty: 18 TABLET | Refills: 5 | Status: SHIPPED | OUTPATIENT
Start: 2022-07-11 | End: 2023-01-12

## 2022-07-11 RX ORDER — ACETAMINOPHEN 500 MG
500-1000 TABLET ORAL EVERY 8 HOURS PRN
Qty: 100 TABLET | Refills: 2 | Status: SHIPPED | OUTPATIENT
Start: 2022-07-11 | End: 2022-08-22

## 2022-07-11 NOTE — PROGRESS NOTES
Beryl is a 32 year old who is being evaluated via a billable video visit.      How would you like to obtain your AVS? MyChart  If the video visit is dropped, the invitation should be resent by: Text to cell phone: [phone]  Will anyone else be joining your video visit? No    ASSESSMENT / PLAN:  (M54.2,  G89.29) Chronic neck pain  (primary encounter diagnosis)  Comment: stable  Plan: tiZANidine (ZANAFLEX) 4 MG tablet,         acetaminophen (TYLENOL) 500 MG tablet        Reveiwed risks and side effects of medication  Discuss if ok with other psych med with her psych provider.     (Z12.4) Cervical cancer screening  Plan: will have team set her up with ob/gyn for pap  Discussed birth control- call/evisit if interesterd - not currently sexually active.     (D70.249) Other migraine without status migrainosus, not intractable  Comment: stable  Plan: SUMAtriptan (IMITREX) 50 MG tablet        Prn. Continue exercise and avoid drugs/ ALCOHOL. Neurology if worse    (M35.7) Hypermobility syndrome  Plan: ibuprofen (ADVIL/MOTRIN) 600 MG tablet        Not history ulcers. Take with food. Consider adding ppi or pepcid to protect stomach if issues. Expected course and warning signs reviewed. Call/email with questions/concerns  Recheck in 6 months  cpe         Subjective   Beryl is a 32 year old accompanied by her self, presenting for the following health issues:  Follow-up pain/fibromyalgia/neck pain- joint hypermoblitiy. History malnutrition/herion abuse.  Seeing psych provider. Emotionally doing ok but currently. History bipolar.   Exercise -more and moved in own apartment and daughter a lot more. 3yo.   No drugs or ALCOHOL   Overdue for pap. No family history mi. Dad estranged. Mom - htn.   No interactions in past with zanaflex and occasionally imitrex. Hit/miss migraines.   Not on ocp. Not currently active.   No history ulcers.  Recheck Medication      HPI     Pain everywhere per patient      Review of Systems          Objective           Vitals:  No vitals were obtained today due to virtual visit.    Physical Exam   GENERAL: Healthy, alert and no distress  EYES: Eyes grossly normal to inspection.  No discharge or erythema, or obvious scleral/conjunctival abnormalities.  RESP: No audible wheeze, cough, or visible cyanosis.  No visible retractions or increased work of breathing.    SKIN: Visible skin clear. No significant rash, abnormal pigmentation or lesions.  NEURO: Cranial nerves grossly intact.  Mentation and speech appropriate for age.  PSYCH: Mentation appears normal, affect normal/bright, judgement and insight intact, normal speech and appearance well-groomed.        Video-Visit Details    Video Start Time: 8:16 AM    Type of service:  Video Visit    Video End Time:8:26 AM    Originating Location (pt. Location): Home    Distant Location (provider location):  [facility]     Platform used for Video Visit: OpenCounter  ..

## 2022-07-12 NOTE — TELEPHONE ENCOUNTER
Writer called pharmacy and was informed that the Hydroxyzine 50 mg- take 1 capsule by mouth 3 times per day had been filled. Pharmacy confirmed that they also have on file:  hydrOXYzine (VISTARIL) 50 MG capsule 120 capsule 1 7/5/2022  No   Sig - Route: Take 1 capsule (50 mg) by mouth 4 times daily as needed for anxiety - Oral
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
## 2022-08-04 ENCOUNTER — MYC MEDICAL ADVICE (OUTPATIENT)
Dept: PSYCHIATRY | Facility: CLINIC | Age: 32
End: 2022-08-04

## 2022-08-04 ENCOUNTER — VIRTUAL VISIT (OUTPATIENT)
Dept: PSYCHIATRY | Facility: CLINIC | Age: 32
End: 2022-08-04
Attending: PSYCHIATRY & NEUROLOGY
Payer: COMMERCIAL

## 2022-08-04 DIAGNOSIS — F90.9 ATTENTION DEFICIT HYPERACTIVITY DISORDER (ADHD), UNSPECIFIED ADHD TYPE: ICD-10-CM

## 2022-08-04 DIAGNOSIS — F41.1 GAD (GENERALIZED ANXIETY DISORDER): ICD-10-CM

## 2022-08-04 PROCEDURE — 99214 OFFICE O/P EST MOD 30 MIN: CPT | Mod: GC | Performed by: STUDENT IN AN ORGANIZED HEALTH CARE EDUCATION/TRAINING PROGRAM

## 2022-08-04 RX ORDER — BUPRENORPHINE HYDROCHLORIDE, NALOXONE HYDROCHLORIDE 8; 2 MG/1; MG/1
FILM, SOLUBLE BUCCAL; SUBLINGUAL
COMMUNITY
Start: 2022-07-28

## 2022-08-04 RX ORDER — BUPROPION HYDROCHLORIDE 150 MG/1
150 TABLET ORAL EVERY MORNING
Qty: 30 TABLET | Refills: 1 | Status: SHIPPED | OUTPATIENT
Start: 2022-08-04 | End: 2022-09-01

## 2022-08-04 RX ORDER — BUPROPION HYDROCHLORIDE 300 MG/1
300 TABLET ORAL EVERY MORNING
Qty: 30 TABLET | Refills: 1 | Status: SHIPPED | OUTPATIENT
Start: 2022-08-04 | End: 2022-09-01

## 2022-08-04 RX ORDER — DEXTROAMPHETAMINE SACCHARATE, AMPHETAMINE ASPARTATE MONOHYDRATE, DEXTROAMPHETAMINE SULFATE AND AMPHETAMINE SULFATE 7.5; 7.5; 7.5; 7.5 MG/1; MG/1; MG/1; MG/1
30 CAPSULE, EXTENDED RELEASE ORAL DAILY
Qty: 30 CAPSULE | Refills: 0 | Status: SHIPPED | OUTPATIENT
Start: 2022-08-19 | End: 2022-09-01

## 2022-08-04 RX ORDER — MIRTAZAPINE 45 MG/1
45 TABLET, FILM COATED ORAL AT BEDTIME
Qty: 30 TABLET | Refills: 1 | Status: SHIPPED | OUTPATIENT
Start: 2022-08-04 | End: 2022-09-01

## 2022-08-04 RX ORDER — HYDROXYZINE PAMOATE 50 MG/1
50 CAPSULE ORAL 4 TIMES DAILY PRN
Qty: 120 CAPSULE | Refills: 1 | Status: SHIPPED | OUTPATIENT
Start: 2022-08-04 | End: 2022-09-01

## 2022-08-04 ASSESSMENT — ANXIETY QUESTIONNAIRES
2. NOT BEING ABLE TO STOP OR CONTROL WORRYING: NEARLY EVERY DAY
4. TROUBLE RELAXING: NEARLY EVERY DAY
7. FEELING AFRAID AS IF SOMETHING AWFUL MIGHT HAPPEN: NEARLY EVERY DAY
8. IF YOU CHECKED OFF ANY PROBLEMS, HOW DIFFICULT HAVE THESE MADE IT FOR YOU TO DO YOUR WORK, TAKE CARE OF THINGS AT HOME, OR GET ALONG WITH OTHER PEOPLE?: EXTREMELY DIFFICULT
IF YOU CHECKED OFF ANY PROBLEMS ON THIS QUESTIONNAIRE, HOW DIFFICULT HAVE THESE PROBLEMS MADE IT FOR YOU TO DO YOUR WORK, TAKE CARE OF THINGS AT HOME, OR GET ALONG WITH OTHER PEOPLE: EXTREMELY DIFFICULT
GAD7 TOTAL SCORE: 17
1. FEELING NERVOUS, ANXIOUS, OR ON EDGE: NEARLY EVERY DAY
6. BECOMING EASILY ANNOYED OR IRRITABLE: SEVERAL DAYS
GAD7 TOTAL SCORE: 17
7. FEELING AFRAID AS IF SOMETHING AWFUL MIGHT HAPPEN: NEARLY EVERY DAY
3. WORRYING TOO MUCH ABOUT DIFFERENT THINGS: NEARLY EVERY DAY
GAD7 TOTAL SCORE: 17
5. BEING SO RESTLESS THAT IT IS HARD TO SIT STILL: SEVERAL DAYS

## 2022-08-04 ASSESSMENT — PATIENT HEALTH QUESTIONNAIRE - PHQ9
SUM OF ALL RESPONSES TO PHQ QUESTIONS 1-9: 14
10. IF YOU CHECKED OFF ANY PROBLEMS, HOW DIFFICULT HAVE THESE PROBLEMS MADE IT FOR YOU TO DO YOUR WORK, TAKE CARE OF THINGS AT HOME, OR GET ALONG WITH OTHER PEOPLE: VERY DIFFICULT
SUM OF ALL RESPONSES TO PHQ QUESTIONS 1-9: 14

## 2022-08-04 NOTE — PATIENT INSTRUCTIONS
**For crisis resources, please see the information at the end of this document**   Patient Education    Thank you for coming to the [facility].     Lab Testing:  If you had lab testing today and your results are reassuring or normal they will be mailed to you or sent through [employer] within 7 days. If the lab tests need quick action we will call you with the results. The phone number we will call with results is # [phone]. If this is not the best number please call our clinic and change the number.     Medication Refills:  If you need any refills please call your pharmacy and they will contact us. Our fax number for refills is [phone].   Three business days of notice are needed for general medication refill requests.   Five business days of notice are needed for controlled substance refill requests.   If you need to change to a different pharmacy, please contact the new pharmacy directly. The new pharmacy will help you get your medications transferred.     Contact Us:  Please call [phone] during business hours (8-5:00 M-F).   If you have medication related questions after clinic hours, or on the weekend, please call [phone].     Financial Assistance [phone]   Medical Records [phone]       MENTAL HEALTH CRISIS RESOURCES:  For a emergency help, please call 911 or go to the nearest Emergency Department.     Emergency Walk-In Options:   EmPATH Unit @ [facility] ([place]): [phone] - Specialized mental health emergency area designed to be calming  [facility] ([place]): [phone]  [facility] ([place]): [phone]  [facility]): [phone]    [facility] Crisis Information:   [place]: [phone]  [place]: [phone]  [place] (REJI) - Adult: [phone]     Child: [phone]  [place] - Adult: [phone]     Child: [phone]  [place]:  [phone]  List of all [facility] resources:   https://[place].gov/dhs/people-we-serve/adults/health-care/mental-health/resources/crisis-contacts.jsp    National Crisis Information:   Crisis Text Line: Text  [place]  to 597371  Suicide & Crisis Lifeline: 988  National Suicide Prevention Lifeline: 8-029-712-TALK (1-914.329.8133)       For online chat options, visit https://suicidepreventionlifeline.org/chat/  Poison Control Center: 7-417-379-3372  Trans Lifeline: 2-410-081-3777 - Hotline for transgender people of all ages  The S[employer] Project: 7-626-094-4772 - Hotline for LGBT youth     For Non-Emergency Support:   Fast Tracker: Mental Health & Substance Use Disorder Resources -   https://www.[employer].org/

## 2022-08-04 NOTE — PROGRESS NOTES
Beryl Valenzuela is a 32 year old who has consented to receive services via billable video visit.      Pt will join video visit via: [employer]  If there are problems joining the visit, send backup video invite via: Text to preferred phone: [phone]      Originating Location (patient location): Patient's home  Distant Location (provider location): [facility] [place] [facility]    Will anyone else be joining the video visit? No  Answers for HPI/ROS submitted by the patient on 8/4/2022  If you checked off any problems, how difficult have these problems made it for you to do your work, take care of things at home, or get along with other people?: Very difficult  PHQ9 TOTAL SCORE: 14  JEREMY 7 TOTAL SCORE: 17

## 2022-08-11 NOTE — CONFIDENTIAL NOTE
Writer contacted patient re: follow up appt.  She does not like early a.m. - the only appointment that works is August 15, 1:50 pm, video.  Patient scheduled for this time.

## 2022-08-14 NOTE — PROGRESS NOTES
Beryl Valenzuela is a 32 year old who has consented to receive services via billable video visit.      Pt will join video visit via: Vorad[employer]  If there are problems joining the visit, send backup video invite via: Text to preferred phone: [phone]      Originating Location (patient location): Patient's home  Distant Location (provider location): [facility] [place] [facility]    Will anyone else be joining the video visit? No  Video- Visit Details  Type of service:  video visit for medication management  Time of service:    Video Start Time:  1:50pm       Video End Time:  3:00  Reason for video visit:  Patient has requested telehealth visit  Originating Site (patient location):  [facility]   Location- Patient's home  Distant Site (provider location):  [facility]  Mode of Communication:  Video Conference via [employer]adius       [facility]iatry [facility]  MEDICAL PROGRESS NOTE     CARE TEAM:  PCP- Carlos Cisneros    Psychotherapist- None     Addiction med (Shawna Owens) - Dr. Arias    This person is a 32 year old who uses the name Hanane and pronouns she, her, hers.      DIAGNOSIS     Generalized anxiety disorder with panic  PTSD  Opioid use disorder, severe, in early remission (on agonist therapy, suboxone)  Unspecified depressive disorder: MDD vs persistent depressive disorder vs bipolar II disorder (history of bipolar II disorder)  ADHD per history  Antisocial personality disorder per history  Borderline personality disorder per history     ASSESSMENT     Beryl is a 31yo seen today for follow-up. Since last visit, life stressors remain (ongoing custody issues with her children). With this, anxiety is no longer under reasonable control, and mood is mostly depressed. Reviewed patient's strengths and success with therapy vs. pharmacologic management of mood and anxiety. Beryl agreed that despite numerous medication trials  in the past, she has seen little benefit from medication management alone. She is reluctant to make any medication changes that could potentially be destabilizing, given important court dates coming up. However, she is interested in increasing hydroxyzine today so that she has a PRN for anxiety during the day. Beryl was encouraged to follow-up on therapy consult for individual psychotherapy (in addition to therapeutic groups through [facility]).       Regarding possible hypomania, Beryl does carry a historical diagnosis of bipolar II disorder. Will continue to watch for hypomania/miesha vs.extreme emotional lability/distress +/- trauma related symptoms. No safety concerns today.         Future considerations:  -pain management referral within our system (currently seeing addiction med provider through [facility] + separate PCP for pain management that she rarely sees, would likely benefit from single provider prescribing suboxone + any other pain-related medications)  -continue to weigh pros/cons of trial on lower doses of adderall and/or wellbutrin (may be contributing to anxiety, although Beryl does not report this)    [place] was [place] today:  Indicates taking controlled medication as prescribed.     PLAN                                                                                                                1) Meds-  - continue adderall XR 30mg daily for ADHD  - continue bupropion 450mg XL daily for ADHD, mood  - continue mirtazapine 45mg at bedtime for mood  - continue guanfacine 4mg at bedtime for attention, anxiety, sleep (questionable benefit from this)  - continue hydroxyzine to 200 mg at bedtime for sleep, anxiety   - START hydroxyzine 50 mg BID PRN for anxiety  - continue gabapentin 300mg TID PRN for anxiety     - suboxone 8-2mg BID (per addiction med)  - tizanidine (per PCP)    2) Psychotherapy- none currently. Previously saw individual therapist through [facility]. Recently completed  "outpatient therapy through [facility] (dual diagnosis?), NA support groups. Has initial psychotherapy visit for October through our clinic    3) Next due-  Labs- PRN  EKG- PRN  Rating scales- PHQ9, GAD7    4) Referrals-  none    5) Dispo- return to clinic in 2 weeks    PERTINENT ITEM HISTORY                                                                                          [most recent eval 8/4/22]     Pertinent Items Include: suicide attempt [x1-2 (once at age 6?, once at age 15)], suicidal ideation, psychosis [sxs include auditory & visual hallucinations, in context of substance use], mutiple psychotropic trials , trauma hx, substance use: opiates and heroin and substance use treatment    SUBJECTIVE     Since the time of the last visit:   - regarding therapy; there was a misunderstanding with scheduling. New therapist has canceled on her 2 Friday's in a row.   - current  with ITP in [place], \"Kenna\" is not a good fit. Feels that Kenna doesn't understand her needs.   - regarding court: overall, doesn't feel that court went well. Kenna accompanied her. Going to trial. Baby's father wants to do 50/50 custody, but wants to wait until child is in school to start this. He also wants to full all of Hanane's medical and MH. Hanane doesn't agree.   - She doesn't have a  and can't afford one right now. This is a source of stress.   - had child overnight last night, enjoyed this time with her.   - overall still feeling very anxious. Used to have a diagnosis of agoraphobia. Still feeling anxious leaving the house in addition to general anxiety/worries   - wondering about increasing Adderral to 40 mg daily to help with focus. Has paperwork that she needs to complete, and hasn't been able to due to inattentiveness  - also wondering about adding hydroxyzine during the day as needed for anxiety.   - wondered if she was having a manic episode last weekend. Now doesn't think that this was miesha, and wonders if " her symptoms were actually panic. She was sleeping fine.     Recent Social History: as above    Recent Psych Symptoms:   Depression:  depressed mood, low energy, overwhelmed and dysregulation  Anxiety:  panic attacks [see HPI], excessive worry and nervous/overwhelmed  Elevated:  mood dysregulation  Psychosis:  none  Trauma: endorses flashbacks    Recent Substance Use:   Tobacco, caffeine. No other use.    Pertinent Negatives: No suicidal or violent ideation, self-injury and psychosis  Adverse Effects: none     MEDICAL HISTORY and ALLERGY     ALLERGIES: Acetaminophen, Lubricants [personal lubricant], and Vicodin [hydrocodone-acetaminophen]    Patient Active Problem List   Diagnosis     CARDIOVASCULAR SCREENING; LDL GOAL LESS THAN 160     Osteoarthritis     Abnormal Pap smear of cervix     Antisocial personality     Anxiety     Mood disorder (H)     Bipolar disorder (H)     Hypermobility syndrome     Acute right otitis media     Migraine     Neck pain     Hx of drug abuse (H)     Positive SHANTA (antinuclear antibody)     Fibromyalgia     Insomnia     Cluster B personality disorder (H)        MEDICAL REVIEW OF SYSTEMS   Contraception- deferred Pregnant- No  A comprehensive review of systems was performed and is negative other than noted in the HPI.     MEDICATIONS     Current Outpatient Medications   Medication Sig Dispense Refill     acetaminophen (TYLENOL) 500 MG tablet Take 1-2 tablets (500-1,000 mg) by mouth every 8 hours as needed for mild pain 100 tablet 2     acetaminophen (TYLENOL) 500 MG tablet Take 1 tablet (500 mg) by mouth every 6 hours as needed for pain 60 tablet 1     [START ON 8/19/2022] amphetamine-dextroamphetamine (ADDERALL XR) 30 MG 24 hr capsule Take 1 capsule (30 mg) by mouth daily 30 capsule 0     buPROPion (WELLBUTRIN XL) 150 MG 24 hr tablet Take 1 tablet (150 mg) by mouth every morning Take with one 300mg tablet for total of 450mg daily 30 tablet 1     buPROPion (WELLBUTRIN XL) 300 MG 24 hr  tablet Take 1 tablet (300 mg) by mouth every morning Take with one 150mg tablet for total of 450mg daily 30 tablet 1     gabapentin (NEURONTIN) 300 MG capsule Take 1 capsule (300 mg) by mouth 3 times daily as needed for other (anxiety) 90 capsule 0     guanFACINE (TENEX) 2 MG tablet Take 2 tablets (4 mg) by mouth At Bedtime 60 tablet 1     hydrOXYzine (VISTARIL) 50 MG capsule Take 1 capsule (50 mg) by mouth 4 times daily as needed for anxiety 120 capsule 1     ibuprofen (ADVIL/MOTRIN) 600 MG tablet Take 1 tablet (600 mg) by mouth every 8 hours as needed for moderate pain (take with food) 100 tablet 1     mirtazapine (REMERON) 45 MG tablet Take 1 tablet (45 mg) by mouth At Bedtime 30 tablet 1     NARCAN 4 MG/0.1ML nasal spray        Prenatal Vit-Fe Fumarate-FA (PNV FOLIC ACID + IRON) 27-1 MG TABS Take 1 each by mouth daily 100 tablet 3     SUBOXONE 4-1 MG per film  (Patient not taking: Reported on 8/4/2022)       SUBOXONE 8-2 MG per film PLACE 1 FILM UNDER THE TONGUE TWICE DAILY. 8MG/2MG FILMS.       SUMAtriptan (IMITREX) 50 MG tablet Route: Take 1 tablet by mouth at onset of headache for migraine. May repeat in 2 hours if needed: max 2/day 18 tablet 5     tiZANidine (ZANAFLEX) 4 MG tablet Take 1 tablet (4 mg) by mouth 3 times daily as needed for muscle spasms 90 tablet 5      VITALS   There were no vitals taken for this visit.    MENTAL STATUS EXAM     Alertness: alert   Appearance: casually groomed  Behavior/Demeanor: cooperative, pleasant and calm, with good  eye contact   Speech: normal and regular rate and rhythm  Language: intact  Psychomotor: normal or unremarkable  Mood: anxious  Affect: full range; congruent to: mood- yes, content- yes  Thought Process/Associations: unremarkable  Thought Content:  Reports none;  Denies suicidal & violent ideation and delusions  Perception:  Reports none;  Denies hallucinations  Insight: fair  Judgment: fair  Cognition: does  appear grossly intact; formal cognitive testing  was not done  Gait and Station: N/A (telehealth)     LABS and DATA     PHQ 4/26/2022 5/31/2022 8/4/2022   PHQ-9 Total Score 23 12 14   Q9: Thoughts of better off dead/self-harm past 2 weeks Not at all Not at all Not at all       No lab results found.  No lab results found.    ECG: none     PSYCHOTROPIC DRUG INTERACTIONS                                       PSYCHCLINICDDI       Mirtazapine + suboxone + adderall = increased risk of sertonin syndrome  adderall + wellbutrin: increased exposure of adderall d/t p450 2d6 interaction  Hydroxyzine + suboxone: risk of Qtc prolongation     MANAGEMENT:  Monitoring for adverse effects     RISK STATEMENT for SAFETY     Beryl did not appear to be an imminent safety risk to self or others.    TREATMENT RISK STATEMENT: The risks, benefits, alternatives and potential adverse effects have been discussed and are understood by the pt. The pt understands the risks of using street drugs or alcohol. There are no medical contraindications, the pt agrees to treatment with the ability to do so. The pt knows to call the clinic for any problems or to access emergency care if needed.  Medical and substance use concerns are documented above.  Psychotropic drug interaction check was done, including changes made today.     PROVIDER: Shefali Johnson MD    Patient staffed in clinic with Dr. Owusu who will sign the note.  Supervisor is Dr. Owusu.

## 2022-08-15 ENCOUNTER — VIRTUAL VISIT (OUTPATIENT)
Dept: PSYCHIATRY | Facility: CLINIC | Age: 32
End: 2022-08-15
Attending: PSYCHIATRY & NEUROLOGY
Payer: COMMERCIAL

## 2022-08-15 DIAGNOSIS — M35.7 HYPERMOBILITY SYNDROME: ICD-10-CM

## 2022-08-15 DIAGNOSIS — F41.1 GAD (GENERALIZED ANXIETY DISORDER): ICD-10-CM

## 2022-08-15 PROCEDURE — 90792 PSYCH DIAG EVAL W/MED SRVCS: CPT | Mod: 95 | Performed by: STUDENT IN AN ORGANIZED HEALTH CARE EDUCATION/TRAINING PROGRAM

## 2022-08-15 RX ORDER — GABAPENTIN 300 MG/1
300 CAPSULE ORAL 3 TIMES DAILY PRN
Qty: 90 CAPSULE | Refills: 0 | Status: SHIPPED | OUTPATIENT
Start: 2022-08-15 | End: 2022-09-20

## 2022-08-15 RX ORDER — HYDROXYZINE PAMOATE 50 MG/1
50 CAPSULE ORAL 2 TIMES DAILY PRN
Qty: 60 CAPSULE | Refills: 1 | Status: SHIPPED | OUTPATIENT
Start: 2022-08-15 | End: 2022-08-24

## 2022-08-15 NOTE — NURSING NOTE
Patient declined individual allergy and medication review by support staff because she said nothing has changed and didn't want to go over them.  QNRS were not assigned, PHQ was not done per department protocol.  Bambi Lua VF

## 2022-08-15 NOTE — PATIENT INSTRUCTIONS
**For crisis resources, please see the information at the end of this document**   Patient Education    Thank you for coming to the [facility] [place] [facility].     Lab Testing:  If you had lab testing today and your results are reassuring or normal they will be mailed to you or sent through [employer] ([employer]) within 7 days. If the lab tests need quick action we will call you with the results. The phone number we will call with results is # [phone]. If this is not the best number please call our clinic and change the number.     Medication Refills:  If you need any refills please call your pharmacy and they will contact us. Our fax number for refills is [phone].   Three business days of notice are needed for general medication refill requests.   Five business days of notice are needed for controlled substance refill requests.   If you need to change to a different pharmacy, please contact the new pharmacy directly. The new pharmacy will help you get your medications transferred.     Contact Us:  Please call [phone] during business hours (8-5:00 M-F).   If you have medication related questions after clinic hours, or on the weekend, please call [phone].     Financial Assistance [phone]   Medical Records [phone]       MENTAL HEALTH CRISIS RESOURCES:  For a emergency help, please call 911 or go to the nearest Emergency Department.     Emergency Walk-In Options:   EmPATH Unit @ [facility] ([place]): [phone] - Specialized mental health emergency area designed to be calming  [facility] ([place]): [phone]  [facility] ([place]): [phone]  [facility]): [phone]    [facility] Crisis Information:   [place]: [phone]  [place]: [phone]  [place] (REJI) - Adult: [phone]     Child: [phone]  [place] - Adult: [phone]     Child: [phone]  [place]:  [phone]  List of all [facility] resources:   https://[place].gov/dhs/people-we-serve/adults/health-care/mental-health/resources/crisis-contacts.jsp    National Crisis Information:   Crisis Text Line: Text  [place]  to 745193  Suicide & Crisis Lifeline: 988  National Suicide Prevention Lifeline: 7-804-854-TALK (1-222.233.5345)       For online chat options, visit https://suicidepreventionlifeline.org/chat/  Poison Control Center: 3-033-735-7266  Trans Lifeline: 4-439-651-3590 - Hotline for transgender people of all ages  The [employer] Project: 0-508-398-4417 - Hotline for LGBT youth     For Non-Emergency Support:   Fast Tracker: Mental Health & Substance Use Disorder Resources -   https://www.[employer].org/         (3) no apparent problem Complex Repair And Dermal Autograft Text: The defect edges were debeveled with a #15 scalpel blade.  The primary defect was closed partially with a complex linear closure.  Given the location of the defect, shape of the defect and the proximity to free margins an dermal autograft was deemed most appropriate to repair the remaining defect.  The graft was trimmed to fit the size of the remaining defect.  The graft was then placed in the primary defect, oriented appropriately, and sutured into place.

## 2022-08-22 ENCOUNTER — MYC MEDICAL ADVICE (OUTPATIENT)
Dept: FAMILY MEDICINE | Facility: CLINIC | Age: 32
End: 2022-08-22

## 2022-08-22 DIAGNOSIS — G89.29 CHRONIC NECK PAIN: ICD-10-CM

## 2022-08-22 DIAGNOSIS — M54.2 CHRONIC NECK PAIN: ICD-10-CM

## 2022-08-22 RX ORDER — ACETAMINOPHEN 500 MG
500-1000 TABLET ORAL EVERY 8 HOURS PRN
Qty: 100 TABLET | Refills: 2 | Status: SHIPPED | OUTPATIENT
Start: 2022-08-22 | End: 2022-09-13

## 2022-08-22 NOTE — TELEPHONE ENCOUNTER
Patient taking acetaminophen 2 tablets 3 times daily so is requesting a larger quantity.    Routing to provider to advise.  Claire Lockett BSN, RN

## 2022-08-23 ENCOUNTER — MYC MEDICAL ADVICE (OUTPATIENT)
Dept: PSYCHIATRY | Facility: CLINIC | Age: 32
End: 2022-08-23

## 2022-08-23 DIAGNOSIS — F41.1 GAD (GENERALIZED ANXIETY DISORDER): ICD-10-CM

## 2022-08-24 RX ORDER — HYDROXYZINE PAMOATE 50 MG/1
200 CAPSULE ORAL AT BEDTIME
Qty: 180 CAPSULE | Refills: 1 | Status: SHIPPED | OUTPATIENT
Start: 2022-08-24 | End: 2022-09-01

## 2022-08-24 NOTE — CONFIDENTIAL NOTE
Per Dr. Johnson:    OK to combine hydroxyzine orders:    hydrOXYzine (VISTARIL) 50 MG capsule 180 capsule 1 8/24/2022  No   Sig - Route: Take 4 capsules (200 mg) by mouth At Bedtime ; In addition, may take 50 mg (1 capsule) up 2 times daily as needed for anxiety - Oral     Updated patient - confirmed which pharmacy

## 2022-09-01 ENCOUNTER — VIRTUAL VISIT (OUTPATIENT)
Dept: PSYCHIATRY | Facility: CLINIC | Age: 32
End: 2022-09-01
Attending: PSYCHIATRY & NEUROLOGY
Payer: COMMERCIAL

## 2022-09-01 DIAGNOSIS — F41.1 GAD (GENERALIZED ANXIETY DISORDER): ICD-10-CM

## 2022-09-01 DIAGNOSIS — F90.9 ATTENTION DEFICIT HYPERACTIVITY DISORDER (ADHD), UNSPECIFIED ADHD TYPE: ICD-10-CM

## 2022-09-01 DIAGNOSIS — M35.7 HYPERMOBILITY SYNDROME: ICD-10-CM

## 2022-09-01 PROCEDURE — 99207 PR SERVICE NOT STAFFED W/SUPERV PROV: CPT | Mod: 95 | Performed by: STUDENT IN AN ORGANIZED HEALTH CARE EDUCATION/TRAINING PROGRAM

## 2022-09-01 RX ORDER — BUPROPION HYDROCHLORIDE 150 MG/1
150 TABLET ORAL EVERY MORNING
Qty: 30 TABLET | Refills: 1 | Status: SHIPPED | OUTPATIENT
Start: 2022-09-01 | End: 2022-10-13

## 2022-09-01 RX ORDER — DEXTROAMPHETAMINE SACCHARATE, AMPHETAMINE ASPARTATE MONOHYDRATE, DEXTROAMPHETAMINE SULFATE AND AMPHETAMINE SULFATE 7.5; 7.5; 7.5; 7.5 MG/1; MG/1; MG/1; MG/1
30 CAPSULE, EXTENDED RELEASE ORAL DAILY
Qty: 30 CAPSULE | Refills: 0 | Status: SHIPPED | OUTPATIENT
Start: 2022-09-21 | End: 2022-09-29

## 2022-09-01 RX ORDER — HYDROXYZINE PAMOATE 50 MG/1
50 CAPSULE ORAL 4 TIMES DAILY PRN
Qty: 120 CAPSULE | Refills: 1 | Status: SHIPPED | OUTPATIENT
Start: 2022-09-01 | End: 2022-09-29

## 2022-09-01 RX ORDER — HYDROXYZINE PAMOATE 50 MG/1
200 CAPSULE ORAL AT BEDTIME
Qty: 180 CAPSULE | Refills: 1 | Status: SHIPPED | OUTPATIENT
Start: 2022-09-01 | End: 2022-09-29

## 2022-09-01 RX ORDER — MIRTAZAPINE 45 MG/1
45 TABLET, FILM COATED ORAL AT BEDTIME
Qty: 30 TABLET | Refills: 1 | Status: SHIPPED | OUTPATIENT
Start: 2022-09-01 | End: 2022-10-25

## 2022-09-01 RX ORDER — GUANFACINE 2 MG/1
4 TABLET ORAL AT BEDTIME
Qty: 60 TABLET | Refills: 1 | Status: SHIPPED | OUTPATIENT
Start: 2022-09-01 | End: 2022-10-25

## 2022-09-01 RX ORDER — BUPROPION HYDROCHLORIDE 300 MG/1
300 TABLET ORAL EVERY MORNING
Qty: 30 TABLET | Refills: 1 | Status: SHIPPED | OUTPATIENT
Start: 2022-09-01 | End: 2022-10-25

## 2022-09-01 NOTE — PROGRESS NOTES
Beryl Valenzuela is a 32 year old who has consented to receive services via billable video visit.      Pt will join video visit via: Caremerge  If there are problems joining the visit, send backup video invite via: Text to preferred phone: [phone]      Originating Location (patient location): Patient's home  Distant Location (provider location): [facility] [place] [facility]    Will anyone else be joining the video visit? No  Video- Visit Details  Type of service:  video visit for medication management  Time of service:    Video Start Time: 2:50       Video End Time:  3:20  Reason for video visit:  Patient has requested telehealth visit  Originating Site (patient location):  [facility]   Location- Patient's home  Distant Site (provider location):  [facility]  Mode of Communication:  Video Conference via Caremerge       [facility]  Psychiatry Clinic  MEDICAL PROGRESS NOTE     CARE TEAM:  PCP- Carlos Cisneros    Psychotherapist- None     Addiction med (Shawna Owens) - Dr. Arias    This person is a 32 year old who uses the name Hanane and pronouns she, her, hers.      DIAGNOSIS     Generalized anxiety disorder with panic  PTSD  Opioid use disorder, severe, in early remission (on agonist therapy, suboxone)  Unspecified depressive disorder: MDD vs persistent depressive disorder vs bipolar II disorder (history of bipolar II disorder)  ADHD per history  Antisocial personality disorder per history  Borderline personality disorder per history     ASSESSMENT     Beryl is a 33yo seen today for follow-up. Since last visit, life stressors remain (ongoing custody issues with her children). With this, anxiety has increased and mood is mostly depressed. Reviewed patient's strengths and success with therapy vs. pharmacologic management of mood and anxiety. Beryl agreed that despite numerous medication trials in the past, she has seen  little benefit from medication management alone. No medication changes today. Beryl was encouraged to follow-up on therapy consult for individual psychotherapy (in addition to therapeutic groups through [facility]).       Regarding possible hypomania, Beryl does carry a historical diagnosis of bipolar II disorder. Will continue to watch for hypomania/miesha vs.extreme emotional lability/distress +/- trauma related symptoms. No safety concerns today.         Future considerations:  -pain management referral within our system (currently seeing addiction med provider through [facility] + separate PCP for pain management that she rarely sees, would likely benefit from single provider prescribing suboxone + any other pain-related medications)  -continue to weigh pros/cons of trial on lower doses of adderall and/or wellbutrin (may be contributing to anxiety, although Beryl does not report this)    [place] was [place] today:  Indicates taking controlled medication as prescribed.     PLAN                                                                                                                1) Meds-  - continue adderall XR 30mg daily for ADHD  - continue bupropion 450mg XL daily for ADHD, mood  - continue mirtazapine 45mg at bedtime for mood  - continue guanfacine 4mg at bedtime for attention, anxiety, sleep (questionable benefit from this)  - continue hydroxyzine to 200 mg at bedtime for sleep, anxiety   - continue hydroxyzine 50 mg BID PRN for anxiety  - continue gabapentin 300mg TID PRN for anxiety     - suboxone 8-2mg BID (per addiction med)  - tizanidine (per PCP)    2) Psychotherapy- none currently. Previously saw individual therapist through [facility]. Recently completed outpatient therapy through [facility] (dual diagnosis?),  support groups. Has initial psychotherapy visit for October through our clinic    3) Next due-  Labs- PRN  EKG- PRN  Rating scales- PHQ9, GAD7    4) Referrals-  none    5) Dispo-  return to clinic in 2 weeks    PERTINENT ITEM HISTORY                                                                                          [most recent eval 8/4/22]     Pertinent Items Include: suicide attempt [x1-2 (once at age 6?, once at age 15)], suicidal ideation, psychosis [sxs include auditory & visual hallucinations, in context of substance use], mutiple psychotropic trials , trauma hx, substance use: opiates and heroin and substance use treatment    SUBJECTIVE     Since the time of the last visit:   - difficulty obtaining suboxone on Friday  - [facility] continues   - upstairs neighbor will be selling her a car. This will be an improvement in terms of mobility. Plans to work a part time job  - turmoil regarding custody continues   - mood and anxiety remain unchanged  - next court in about a month    Recent Social History: as above    Recent Psych Symptoms:   Depression:  depressed mood, low energy, overwhelmed and dysregulation  Anxiety:  panic attacks [see HPI], excessive worry and nervous/overwhelmed  Elevated:  mood dysregulation  Psychosis:  none  Trauma: endorses flashbacks    Recent Substance Use:   Tobacco, caffeine. No other use.    Pertinent Negatives: No suicidal or violent ideation, self-injury and psychosis  Adverse Effects: none     MEDICAL HISTORY and ALLERGY     ALLERGIES: Acetaminophen, Lubricants [personal lubricant], and Vicodin [hydrocodone-acetaminophen]    Patient Active Problem List   Diagnosis     CARDIOVASCULAR SCREENING; LDL GOAL LESS THAN 160     Osteoarthritis     Abnormal Pap smear of cervix     Antisocial personality     Anxiety     Mood disorder (H)     Bipolar disorder (H)     Hypermobility syndrome     Acute right otitis media     Migraine     Neck pain     Hx of drug abuse (H)     Positive SHANTA (antinuclear antibody)     Fibromyalgia     Insomnia     Cluster B personality disorder (H)        MEDICAL REVIEW OF SYSTEMS   Contraception- deferred Pregnant- No  A comprehensive review  of systems was performed and is negative other than noted in the HPI.     MEDICATIONS     Current Outpatient Medications   Medication Sig Dispense Refill     acetaminophen (TYLENOL) 500 MG tablet Take 1-2 tablets (500-1,000 mg) by mouth every 8 hours as needed for mild pain 100 tablet 2     acetaminophen (TYLENOL) 500 MG tablet Take 1 tablet (500 mg) by mouth every 6 hours as needed for pain 60 tablet 1     amphetamine-dextroamphetamine (ADDERALL XR) 30 MG 24 hr capsule Take 1 capsule (30 mg) by mouth daily 30 capsule 0     buPROPion (WELLBUTRIN XL) 150 MG 24 hr tablet Take 1 tablet (150 mg) by mouth every morning Take with one 300mg tablet for total of 450mg daily 30 tablet 1     buPROPion (WELLBUTRIN XL) 300 MG 24 hr tablet Take 1 tablet (300 mg) by mouth every morning Take with one 150mg tablet for total of 450mg daily 30 tablet 1     gabapentin (NEURONTIN) 300 MG capsule Take 1 capsule (300 mg) by mouth 3 times daily as needed for other (anxiety) 90 capsule 0     guanFACINE (TENEX) 2 MG tablet Take 2 tablets (4 mg) by mouth At Bedtime 60 tablet 1     hydrOXYzine (VISTARIL) 50 MG capsule Take 4 capsules (200 mg) by mouth At Bedtime ; In addition, may take 50 mg (1 capsule) up 2 times daily as needed for anxiety 180 capsule 1     hydrOXYzine (VISTARIL) 50 MG capsule Take 1 capsule (50 mg) by mouth 4 times daily as needed for anxiety 120 capsule 1     ibuprofen (ADVIL/MOTRIN) 600 MG tablet Take 1 tablet (600 mg) by mouth every 8 hours as needed for moderate pain (take with food) 100 tablet 1     mirtazapine (REMERON) 45 MG tablet Take 1 tablet (45 mg) by mouth At Bedtime 30 tablet 1     NARCAN 4 MG/0.1ML nasal spray        Prenatal Vit-Fe Fumarate-FA (PNV FOLIC ACID + IRON) 27-1 MG TABS Take 1 each by mouth daily 100 tablet 3     SUBOXONE 4-1 MG per film  (Patient not taking: Reported on 8/4/2022)       SUBOXONE 8-2 MG per film PLACE 1 FILM UNDER THE TONGUE TWICE DAILY. 8MG/2MG FILMS.       SUMAtriptan (IMITREX) 50  MG tablet Route: Take 1 tablet by mouth at onset of headache for migraine. May repeat in 2 hours if needed: max 2/day 18 tablet 5     tiZANidine (ZANAFLEX) 4 MG tablet Take 1 tablet (4 mg) by mouth 3 times daily as needed for muscle spasms 90 tablet 5      VITALS   There were no vitals taken for this visit.    MENTAL STATUS EXAM     Alertness: alert   Appearance: casually groomed  Behavior/Demeanor: cooperative, pleasant and calm, with good  eye contact   Speech: normal and regular rate and rhythm  Language: intact  Psychomotor: normal or unremarkable  Mood: anxious  Affect: full range; congruent to: mood- yes, content- yes  Thought Process/Associations: unremarkable  Thought Content:  Reports none;  Denies suicidal & violent ideation and delusions  Perception:  Reports none;  Denies hallucinations  Insight: fair  Judgment: fair  Cognition: does  appear grossly intact; formal cognitive testing was not done  [facility]     LABS and DATA     PHQ 4/26/2022 5/31/2022 8/4/2022   PHQ-9 Total Score 23 12 14   Q9: Thoughts of better off dead/self-harm past 2 weeks Not at all Not at all Not at all       No lab results found.  No lab results found.    ECG: none     PSYCHOTROPIC DRUG INTERACTIONS                                       PSYCHCLINICDDI     Mirtazapine + suboxone + adderall = increased risk of sertonin syndrome  adderall + wellbutrin: increased exposure of adderall d/t p450 2d6 interaction  Hydroxyzine + suboxone: risk of Qtc prolongation     MANAGEMENT:  Monitoring for adverse effects     RISK STATEMENT for SAFETY     Berly did not appear to be an imminent safety risk to self or others.    TREATMENT RISK STATEMENT: The risks, benefits, alternatives and potential adverse effects have been discussed and are understood by the pt. The pt understands the risks of using street drugs or alcohol. There are no medical contraindications, the pt agrees to treatment with the ability to do so. The  pt knows to call the clinic for any problems or to access emergency care if needed.  Medical and substance use concerns are documented above.  Psychotropic drug interaction check was done, including changes made today.     PROVIDER: Shefali Johnson MD    Patient not staffed in clinic.  Note will be reviewed and signed by supervisor Dr. Owusu.

## 2022-09-01 NOTE — PATIENT INSTRUCTIONS
Generalized anxiety disorder with panic  PTSD  Opioid use disorder, severe, in early remission (on agonist therapy, suboxone)  Bipolar II disorder per history  ADHD per history    Current medications  - continue adderall XR 30mg daily for ADHD  - continue bupropion 450mg XL daily for ADHD, mood  - continue mirtazapine 45mg at bedtime for mood  - continue guanfacine 4mg at bedtime for attention, anxiety, sleep  - continue hydroxyzine to 200 mg at bedtime for sleep, anxiety   - continue hydroxyzine 50- 100 mg BID PRN for anxiety  - continue gabapentin 300mg TID PRN for anxiety     - suboxone 8-2mg BID (per addiction med)  - tizanidine (per PCP)        **For crisis resources, please see the information at the end of this document**   Patient Education    Thank you for coming to the [facility] [place] [facility].     Lab Testing:  If you had lab testing today and your results are reassuring or normal they will be mailed to you or sent through [employer] within 7 days. If the lab tests need quick action we will call you with the results. The phone number we will call with results is # [phone]. If this is not the best number please call our clinic and change the number.     Medication Refills:  If you need any refills please call your pharmacy and they will contact us. Our fax number for refills is [phone].   Three business days of notice are needed for general medication refill requests.   Five business days of notice are needed for controlled substance refill requests.   If you need to change to a different pharmacy, please contact the new pharmacy directly. The new pharmacy will help you get your medications transferred.     Contact Us:  Please call [phone] during business hours (8-5:00 M-F).   If you have medication related questions after clinic hours, or on the weekend, please call [phone].     Financial Assistance [phone]   Medical Records [phone]        MENTAL HEALTH CRISIS RESOURCES:  For a emergency help, please call 911 or go to the nearest Emergency Department.     Emergency Walk-In Options:   EmPATH Unit @ [facility] ([place]): [phone] - Specialized mental health emergency area designed to be calming   [facility] ([place]): [phone]  [facility] ([place]): [phone]  [facility]): [phone]    [facility] Crisis Information:   [place]: [phone]  [place]: [phone]  [place] (COPE) - Adult: [phone]     Child: [phone]  [place] - Adult: [phone]     Child: [phone]  [place]: [phone]  List of all [facility] resources:   [email or web address]-[email or web address]/resources/[email or web address]-contacts.[email or web address]    National Crisis Information:   Crisis Text Line: Text  [place]  to 012844  Suicide & Crisis Lifeline: 988  National Suicide Prevention Lifeline: 1-096-511-TALK (1-829.973.1279)       For online chat options, visit https://suicidepreventionlifeline.org/chat/  Poison Control Center: 0-128-934-3065  Trans Lifeline: 1-451.929.9942 - Hotline for transgender people of all ages  The [employer] Project: 8-286-668-5447 - Hotline for LGBT youth     For Non-Emergency Support:   Fast Tracker: Mental Health & Substance Use Disorder Resources -   https://www.[place].org/

## 2022-09-02 ENCOUNTER — MYC MEDICAL ADVICE (OUTPATIENT)
Dept: PSYCHIATRY | Facility: CLINIC | Age: 32
End: 2022-09-02

## 2022-09-02 NOTE — LETTER
9/2/2022       RE: Beryl Valenzuela  [address]  [place] [address]     To Whom It May Concern:    Beryl Valenzuela is my patient and has been under my care at the [facility]. I am familiar with her history and with the functional limitations imposed by her  mental illness. She meets the definition of disability under the Americans with Disabilities Act, the  Fair Housing Act, and the Rehabilitation Act of 1973.    Due to this emotional/mental disability, Beryl has severe symptoms. In order to help alleviate  these symptoms, and to enhance her ability to live independently and to fully use and care for the  housing unit where she lives, I prescribe and fully support Beryl's use of an emotional support  animal. Research has shown that the presence of RAQUEL animals helps alleviate mental health  symptoms.    Sincerely,      [electronically signed 09/02/2022 at 2:12 pm]     Shefali Johnson MD

## 2022-09-06 ENCOUNTER — MYC MEDICAL ADVICE (OUTPATIENT)
Dept: FAMILY MEDICINE | Facility: CLINIC | Age: 32
End: 2022-09-06

## 2022-09-06 DIAGNOSIS — G89.29 CHRONIC NECK PAIN: ICD-10-CM

## 2022-09-06 DIAGNOSIS — M54.2 CHRONIC NECK PAIN: ICD-10-CM

## 2022-09-13 ENCOUNTER — MYC MEDICAL ADVICE (OUTPATIENT)
Dept: PSYCHIATRY | Facility: CLINIC | Age: 32
End: 2022-09-13

## 2022-09-13 RX ORDER — ACETAMINOPHEN 500 MG
500-1000 TABLET ORAL EVERY 8 HOURS PRN
Qty: 100 TABLET | Refills: 2 | Status: SHIPPED | OUTPATIENT
Start: 2022-09-13 | End: 2022-10-13

## 2022-09-13 NOTE — CONFIDENTIAL NOTE
Patient asking for appt w/provider to prepare for an upcoming court date re: her baby.  Patient is also requesting a letter that reviews her progress.  [facility] at 12:50.

## 2022-09-14 ENCOUNTER — VIRTUAL VISIT (OUTPATIENT)
Dept: PSYCHIATRY | Facility: CLINIC | Age: 32
End: 2022-09-14
Attending: PSYCHIATRY & NEUROLOGY
Payer: COMMERCIAL

## 2022-09-14 DIAGNOSIS — F41.1 GAD (GENERALIZED ANXIETY DISORDER): ICD-10-CM

## 2022-09-14 DIAGNOSIS — F43.10 PTSD (POST-TRAUMATIC STRESS DISORDER): ICD-10-CM

## 2022-09-14 DIAGNOSIS — F90.9 ATTENTION DEFICIT HYPERACTIVITY DISORDER (ADHD), UNSPECIFIED ADHD TYPE: Primary | ICD-10-CM

## 2022-09-14 PROCEDURE — 99214 OFFICE O/P EST MOD 30 MIN: CPT | Mod: 95 | Performed by: STUDENT IN AN ORGANIZED HEALTH CARE EDUCATION/TRAINING PROGRAM

## 2022-09-14 ASSESSMENT — ANXIETY QUESTIONNAIRES
2. NOT BEING ABLE TO STOP OR CONTROL WORRYING: NEARLY EVERY DAY
8. IF YOU CHECKED OFF ANY PROBLEMS, HOW DIFFICULT HAVE THESE MADE IT FOR YOU TO DO YOUR WORK, TAKE CARE OF THINGS AT HOME, OR GET ALONG WITH OTHER PEOPLE?: EXTREMELY DIFFICULT
IF YOU CHECKED OFF ANY PROBLEMS ON THIS QUESTIONNAIRE, HOW DIFFICULT HAVE THESE PROBLEMS MADE IT FOR YOU TO DO YOUR WORK, TAKE CARE OF THINGS AT HOME, OR GET ALONG WITH OTHER PEOPLE: EXTREMELY DIFFICULT
4. TROUBLE RELAXING: NEARLY EVERY DAY
GAD7 TOTAL SCORE: 17
7. FEELING AFRAID AS IF SOMETHING AWFUL MIGHT HAPPEN: NEARLY EVERY DAY
GAD7 TOTAL SCORE: 17
GAD7 TOTAL SCORE: 17
5. BEING SO RESTLESS THAT IT IS HARD TO SIT STILL: SEVERAL DAYS
1. FEELING NERVOUS, ANXIOUS, OR ON EDGE: NEARLY EVERY DAY
6. BECOMING EASILY ANNOYED OR IRRITABLE: SEVERAL DAYS
3. WORRYING TOO MUCH ABOUT DIFFERENT THINGS: NEARLY EVERY DAY
7. FEELING AFRAID AS IF SOMETHING AWFUL MIGHT HAPPEN: NEARLY EVERY DAY

## 2022-09-14 ASSESSMENT — PATIENT HEALTH QUESTIONNAIRE - PHQ9
10. IF YOU CHECKED OFF ANY PROBLEMS, HOW DIFFICULT HAVE THESE PROBLEMS MADE IT FOR YOU TO DO YOUR WORK, TAKE CARE OF THINGS AT HOME, OR GET ALONG WITH OTHER PEOPLE: EXTREMELY DIFFICULT
SUM OF ALL RESPONSES TO PHQ QUESTIONS 1-9: 15
SUM OF ALL RESPONSES TO PHQ QUESTIONS 1-9: 15

## 2022-09-14 NOTE — PROGRESS NOTES
Beryl Valenzuela is a 32 year old who has consented to receive services via billable video visit.      Pt will join video visit via: Vignani  If there are problems joining the visit, send backup video invite via: Text to preferred phone: [phone]      Originating Location (patient location): Patient's home  Distant Location (provider location): [facility] [place] [facility]    Will anyone else be joining the video visit? No  Video- Visit Details  Type of service:  video visit for medication management  Time of service:    Video Start Time: 12:50       Video End Time:  1:20  Reason for video visit:  Patient has requested telehealth visit  Originating Site (patient location):  [facility] [place]   Location- Patient's home  Distant Site (provider location):  [facility]  Mode of Communication:  Video Conference via Vignani       [facility]  Psychiatry Clinic  MEDICAL PROGRESS NOTE     CARE TEAM:  PCP- Carlos Cisneros    Psychotherapist- None     Addiction med (Shawna Owens) - Dr. Arias    This person is a 32 year old who uses the name Hanane and pronouns she, her, hers.      DIAGNOSIS     Generalized anxiety disorder with panic  PTSD  Opioid use disorder, severe, in early remission (on agonist therapy, suboxone)  Unspecified depressive disorder: MDD vs persistent depressive disorder vs bipolar II disorder (history of bipolar II disorder)  ADHD per history  Antisocial personality disorder per history  Borderline personality disorder per history     ASSESSMENT     Beryl is a 33yo seen today for follow-up. Since last visit, life stressors remain (ongoing custody issues with her children). With this, anxiety has increased and mood is mostly depressed. Purpose for today's appointment was to most accurately depict her care history in court documents. Discussed seeking legal representation for current custody case and today consulted  social work for help with this. No medication changes. No acute safety concerns.        Beryl continues to be an active participant in treatment planning and has been adherent to treatment plan. No missed since appointments since I took over her care. No recent concerns for substance use/misuse. MNPMP indicates taking medications as prescribed. Beryl continued to work to improve her mental health with the ultimate goal of retaining custody of her child.    Future considerations:  -pain management referral within our system (currently seeing addiction med provider through [facility] + separate PCP for pain management that she rarely sees, would likely benefit from single provider prescribing suboxone + any other pain-related medications)  -continue to weigh pros/cons of trial on lower doses of adderall and/or wellbutrin (may be contributing to anxiety, although Beryl does not report this)    MNPMP was checked today:  Indicates taking controlled medication as prescribed.     PLAN                                                                                                                1) Meds-  - continue adderall XR 30mg daily for ADHD  - continue bupropion 450mg XL daily for ADHD, mood  - continue mirtazapine 45mg at bedtime for mood  - continue guanfacine 4mg at bedtime for attention, anxiety, sleep (questionable benefit from this)  - continue hydroxyzine to 200 mg at bedtime for sleep, anxiety   - continue hydroxyzine 50 mg BID PRN for anxiety  - continue gabapentin 300mg TID PRN for anxiety     - suboxone 8-2mg BID (per addiction med)  - tizanidine (per PCP)    2) Psychotherapy- none currently. Previously saw individual therapist through [facility]. Recently completed outpatient therapy through [facility] (dual diagnosis?),  support groups. Has initial psychotherapy visit for October through our clinic    3) Next due-  Labs- PRN  EKG- PRN  Rating scales- PHQ9, GAD7    4) Referrals-  Consult with  SW re: resources for low cost legal representation    5) Dispo- 9/29    PERTINENT ITEM HISTORY                                                                                          [most recent eval 8/4/22]     Pertinent Items Include: suicide attempt [x1-2 (once at age 6?, once at age 15)], suicidal ideation, psychosis [sxs include auditory & visual hallucinations, in context of substance use], mutiple psychotropic trials , trauma hx, substance use: opiates and heroin and substance use treatment    SUBJECTIVE     Since the time of the last visit:   - turmoil regarding custody continues   - mood and anxiety remain unchanged  - next court in about a month  - apparent court order for all mental health notes from last 5 years  - discussed seeking legal representation. Patient has attempted to find affordable representation without success      Recent Social History: as above    Recent Psych Symptoms:   Depression:  depressed mood, low energy, overwhelmed and dysregulation  Anxiety:  panic attacks [see HPI], excessive worry and nervous/overwhelmed  Elevated:  mood dysregulation  Psychosis:  none  Trauma: endorses flashbacks    Recent Substance Use:   Tobacco, caffeine. No other use.    Pertinent Negatives: No suicidal or violent ideation, self-injury and psychosis  Adverse Effects: none     MEDICAL HISTORY and ALLERGY     ALLERGIES: Acetaminophen, Lubricants [personal lubricant], and Vicodin [hydrocodone-acetaminophen]    Patient Active Problem List   Diagnosis     CARDIOVASCULAR SCREENING; LDL GOAL LESS THAN 160     Osteoarthritis     Abnormal Pap smear of cervix     Antisocial personality     Anxiety     Mood disorder (H)     Bipolar disorder (H)     Hypermobility syndrome     Acute right otitis media     Migraine     Neck pain     Hx of drug abuse (H)     Positive SHANTA (antinuclear antibody)     Fibromyalgia     Insomnia     Cluster B personality disorder (H)        MEDICAL REVIEW OF SYSTEMS   Contraception-  deferred Pregnant- No    A comprehensive review of systems was performed and is negative other than noted in the HPI.     MEDICATIONS     Current Outpatient Medications   Medication Sig Dispense Refill     acetaminophen (TYLENOL) 500 MG tablet Take 1-2 tablets (500-1,000 mg) by mouth every 8 hours as needed for mild pain 100 tablet 2     acetaminophen (TYLENOL) 500 MG tablet Take 1 tablet (500 mg) by mouth every 6 hours as needed for pain 60 tablet 1     [START ON 9/21/2022] amphetamine-dextroamphetamine (ADDERALL XR) 30 MG 24 hr capsule Take 1 capsule (30 mg) by mouth daily 30 capsule 0     buPROPion (WELLBUTRIN XL) 150 MG 24 hr tablet Take 1 tablet (150 mg) by mouth every morning Take with one 300mg tablet for total of 450mg daily 30 tablet 1     buPROPion (WELLBUTRIN XL) 300 MG 24 hr tablet Take 1 tablet (300 mg) by mouth every morning Take with one 150mg tablet for total of 450mg daily 30 tablet 1     gabapentin (NEURONTIN) 300 MG capsule Take 1 capsule (300 mg) by mouth 3 times daily as needed for other (anxiety) 90 capsule 0     guanFACINE (TENEX) 2 MG tablet Take 2 tablets (4 mg) by mouth At Bedtime 60 tablet 1     hydrOXYzine (VISTARIL) 50 MG capsule Take 4 capsules (200 mg) by mouth At Bedtime ; In addition, may take 50 mg (1 capsule) up 2 times daily as needed for anxiety 180 capsule 1     hydrOXYzine (VISTARIL) 50 MG capsule Take 1 capsule (50 mg) by mouth 4 times daily as needed for anxiety 120 capsule 1     ibuprofen (ADVIL/MOTRIN) 600 MG tablet Take 1 tablet (600 mg) by mouth every 8 hours as needed for moderate pain (take with food) 100 tablet 1     mirtazapine (REMERON) 45 MG tablet Take 1 tablet (45 mg) by mouth At Bedtime 30 tablet 1     NARCAN 4 MG/0.1ML nasal spray        Prenatal Vit-Fe Fumarate-FA (PNV FOLIC ACID + IRON) 27-1 MG TABS Take 1 each by mouth daily 100 tablet 3     SUBOXONE 8-2 MG per film PLACE 1 FILM UNDER THE TONGUE TWICE DAILY. 8MG/2MG FILMS.       SUMAtriptan (IMITREX) 50 MG  tablet Route: Take 1 tablet by mouth at onset of headache for migraine. May repeat in 2 hours if needed: max 2/day 18 tablet 5     tiZANidine (ZANAFLEX) 4 MG tablet Take 1 tablet (4 mg) by mouth 3 times daily as needed for muscle spasms 90 tablet 5     SUBOXONE 4-1 MG per film  (Patient not taking: No sig reported)        VITALS   There were no vitals taken for this visit.    MENTAL STATUS EXAM     Alertness: alert   Appearance: casually groomed  Behavior/Demeanor: cooperative, pleasant and calm, with good  eye contact   Speech: normal and regular rate and rhythm  Language: intact  Psychomotor: normal or unremarkable  Mood: anxious   Affect: full range; congruent to: mood- yes, content- yes  Thought Process/Associations: unremarkable  Thought Content:  Reports none;  Denies suicidal & violent ideation and delusions  Perception:  Reports none;  Denies hallucinations  Insight: fair  Judgment: fair  Cognition: does  appear grossly intact; formal cognitive testing was not done  Gait and Station: N/A (telehealth)     LABS and DATA     PHQ 5/31/2022 8/4/2022 9/14/2022   PHQ-9 Total Score 12 14 15   Q9: Thoughts of better off dead/self-harm past 2 weeks Not at all Not at all Not at all       No lab results found.  No lab results found.    ECG: none     PSYCHOTROPIC DRUG INTERACTIONS                                       PSYCHCLINICDDI     Mirtazapine + suboxone + adderall = increased risk of sertonin syndrome  adderall + wellbutrin: increased exposure of adderall d/t p450 2d6 interaction  Hydroxyzine + suboxone: risk of Qtc prolongation     MANAGEMENT:  Monitoring for adverse effects     RISK STATEMENT for SAFETY     Beryl did not appear to be an imminent safety risk to self or others.    TREATMENT RISK STATEMENT: The risks, benefits, alternatives and potential adverse effects have been discussed and are understood by the pt. The pt understands the risks of using street drugs or alcohol. There are no medical  contraindications, the pt agrees to treatment with the ability to do so. The pt knows to call the clinic for any problems or to access emergency care if needed.  Medical and substance use concerns are documented above.  Psychotropic drug interaction check was done, including changes made today.     PROVIDER: Shefali Johnson MD    Patient staffed in clinic with Dr. Flor who will sign the note.  Supervisor is Dr. Owusu.

## 2022-09-14 NOTE — PROGRESS NOTES
Beryl Valenzuela is a 32 year old who has consented to receive services via billable video visit.      Pt will join video visit via: Optifreeze  If there are problems joining the visit, send backup video invite via: Text to preferred phone: [phone]      Originating Location (patient location): Patient's home  Distant Location (provider location): [facility] [place] [facility]    Will anyone else be joining the video visit? No  Answers for HPI/ROS submitted by the patient on 9/14/2022  If you checked off any problems, how difficult have these problems made it for you to do your work, take care of things at home, or get along with other people?: Extremely difficult  PHQ9 TOTAL SCORE: 15  JEREMY 7 TOTAL SCORE: 17

## 2022-09-14 NOTE — PATIENT INSTRUCTIONS
[place] : [phone]    Clinic fax number: [phone]    **For crisis resources, please see the information at the end of this document**   Patient Education    Thank you for coming to the [facility] MENT[facility] HEALTH & ADDI[facility] [place] [facility].     Lab Testing:  If you had lab testing today and your results are reassuring or normal they will be mailed to you or sent through OpenAir within 7 days. If the lab tests need quick action we will call you with the results. The phone number we will call with results is # [phone]. If this is not the best number please call our clinic and change the number.     Medication Refills:  If you need any refills please call your pharmacy and they will contact us. Our fax number for refills is [phone].   Three business days of notice are needed for general medication refill requests.   Five business days of notice are needed for controlled substance refill requests.   If you need to change to a different pharmacy, please contact the new pharmacy directly. The new pharmacy will help you get your medications transferred.     Contact Us:  Please call [phone] during business hours (8-5:00 M-F).   If you have medication related questions after clinic hours, or on the weekend, please call [phone].     Financial Assistance [phone]   Medical Records [phone]       MENTAL HEALTH CRISIS RESOURCES:  For a emergency help, please call 911 or go to the nearest Emergency Department.     Emergency Walk-In Options:   EmPATH Unit @ [facility] ([place]): [phone] - Specialized mental health emergency area designed to be calming  [facility] ([place]): [phone]  [facility] ([place]): [phone]  [facility]): [phone]    [facility] Crisis Information:   [place]: [phone]  [place]: [phone]  [place] (REJI) - Adult: [phone]     Child: [phone]  [place] - Adult:  [phone]     Child: [phone]  [place]: [phone]  List of all [facility] resources:   https://[place].gov/dhs/people-we-serve/adults/health-care/mental-health/resources/crisis-contacts.jsp    National Crisis Information:   Crisis Text Line: Text  [place]  to 899109  Suicide & Crisis Lifeline: 988  National Suicide Prevention Lifeline: 2-237-116-TALK (1-774.263.1287)       For online chat options, visit https://suicidepreventionlifeline.org/chat/  Poison Control Center: 6-481-637-5945  Trans Lifeline: 5-377-372-0763 - Hotline for transgender people of all ages  The Sergio Project: 5-480-579-3455 - Hotline for LGBT youth     For Non-Emergency Support:   Fast Tracker: Mental Health & Substance Use Disorder Resources -   https://www.Shipstertrack8bitn.org/

## 2022-09-15 ENCOUNTER — MYC MEDICAL ADVICE (OUTPATIENT)
Dept: PSYCHIATRY | Facility: CLINIC | Age: 32
End: 2022-09-15

## 2022-09-15 NOTE — CONFIDENTIAL NOTE
SW called Beryl to follow up on My chart message. Writer hoping to know more about personal lawsuit for custody vs what would typically occur. Writer also hoping to get information about finances to see if she qualifies for some additional support.    Writer reviewed that agency provider had hoped to refer to ([facility]) is not currently taking family cases, but may be able to answer questions or do a consult.     Requested call back, provided number and availability.    CHRISTOPH Martinez, [facility] HospitalSW  [phone]

## 2022-09-18 DIAGNOSIS — F41.1 GAD (GENERALIZED ANXIETY DISORDER): ICD-10-CM

## 2022-09-18 DIAGNOSIS — M35.7 HYPERMOBILITY SYNDROME: ICD-10-CM

## 2022-09-20 RX ORDER — GABAPENTIN 300 MG/1
300 CAPSULE ORAL 3 TIMES DAILY PRN
Qty: 90 CAPSULE | Refills: 0 | Status: SHIPPED | OUTPATIENT
Start: 2022-09-20 | End: 2022-09-29

## 2022-09-29 ENCOUNTER — VIRTUAL VISIT (OUTPATIENT)
Dept: PSYCHIATRY | Facility: CLINIC | Age: 32
End: 2022-09-29
Attending: PSYCHIATRY & NEUROLOGY
Payer: COMMERCIAL

## 2022-09-29 DIAGNOSIS — M35.7 HYPERMOBILITY SYNDROME: ICD-10-CM

## 2022-09-29 DIAGNOSIS — F41.1 GAD (GENERALIZED ANXIETY DISORDER): ICD-10-CM

## 2022-09-29 DIAGNOSIS — F90.9 ATTENTION DEFICIT HYPERACTIVITY DISORDER (ADHD), UNSPECIFIED ADHD TYPE: ICD-10-CM

## 2022-09-29 PROCEDURE — 99215 OFFICE O/P EST HI 40 MIN: CPT | Mod: GT | Performed by: STUDENT IN AN ORGANIZED HEALTH CARE EDUCATION/TRAINING PROGRAM

## 2022-09-29 RX ORDER — HYDROXYZINE PAMOATE 25 MG/1
25 CAPSULE ORAL 2 TIMES DAILY PRN
Qty: 60 CAPSULE | Refills: 0 | Status: SHIPPED | OUTPATIENT
Start: 2022-09-29 | End: 2022-10-25

## 2022-09-29 RX ORDER — GABAPENTIN 300 MG/1
300 CAPSULE ORAL 2 TIMES DAILY PRN
Qty: 60 CAPSULE | Refills: 0 | Status: SHIPPED | OUTPATIENT
Start: 2022-09-29 | End: 2022-10-25

## 2022-09-29 RX ORDER — HYDROXYZINE PAMOATE 50 MG/1
100 CAPSULE ORAL AT BEDTIME
Qty: 60 CAPSULE | Refills: 1 | Status: SHIPPED | OUTPATIENT
Start: 2022-09-29 | End: 2022-10-25

## 2022-09-29 RX ORDER — DEXTROAMPHETAMINE SACCHARATE, AMPHETAMINE ASPARTATE MONOHYDRATE, DEXTROAMPHETAMINE SULFATE AND AMPHETAMINE SULFATE 7.5; 7.5; 7.5; 7.5 MG/1; MG/1; MG/1; MG/1
30 CAPSULE, EXTENDED RELEASE ORAL DAILY
Qty: 30 CAPSULE | Refills: 0 | Status: SHIPPED | OUTPATIENT
Start: 2022-10-22 | End: 2022-10-17

## 2022-09-29 ASSESSMENT — ANXIETY QUESTIONNAIRES
8. IF YOU CHECKED OFF ANY PROBLEMS, HOW DIFFICULT HAVE THESE MADE IT FOR YOU TO DO YOUR WORK, TAKE CARE OF THINGS AT HOME, OR GET ALONG WITH OTHER PEOPLE?: EXTREMELY DIFFICULT
7. FEELING AFRAID AS IF SOMETHING AWFUL MIGHT HAPPEN: NEARLY EVERY DAY
IF YOU CHECKED OFF ANY PROBLEMS ON THIS QUESTIONNAIRE, HOW DIFFICULT HAVE THESE PROBLEMS MADE IT FOR YOU TO DO YOUR WORK, TAKE CARE OF THINGS AT HOME, OR GET ALONG WITH OTHER PEOPLE: EXTREMELY DIFFICULT
GAD7 TOTAL SCORE: 18
GAD7 TOTAL SCORE: 18
1. FEELING NERVOUS, ANXIOUS, OR ON EDGE: NEARLY EVERY DAY
5. BEING SO RESTLESS THAT IT IS HARD TO SIT STILL: MORE THAN HALF THE DAYS
6. BECOMING EASILY ANNOYED OR IRRITABLE: SEVERAL DAYS
3. WORRYING TOO MUCH ABOUT DIFFERENT THINGS: NEARLY EVERY DAY
4. TROUBLE RELAXING: NEARLY EVERY DAY
2. NOT BEING ABLE TO STOP OR CONTROL WORRYING: NEARLY EVERY DAY
7. FEELING AFRAID AS IF SOMETHING AWFUL MIGHT HAPPEN: NEARLY EVERY DAY
GAD7 TOTAL SCORE: 18

## 2022-09-29 NOTE — PATIENT INSTRUCTIONS
Change today:     - Please get a blood pressure    - continue hydroxyzine 200 mg at bedtime    - hydroxyzine 25 mg (either as a one time dose of 50 mg or two 25 mg doses as needed for anxiety)    - Decrease gabapentin to 300 mg twice a day as needed for anxiety     - I will reach out to your PCP about Imitrex    **For crisis resources, please see the information at the end of this document**   Patient Education    Thank you for coming to the [facility] M[facility] HEALTH & ADDI[facility] [place] [facility].     Lab Testing:  If you had lab testing today and your results are reassuring or normal they will be mailed to you or sent through [employer] within 7 days. If the lab tests need quick action we will call you with the results. The phone number we will call with results is # [phone]. If this is not the best number please call our clinic and change the number.     Medication Refills:  If you need any refills please call your pharmacy and they will contact us. Our fax number for refills is [phone].   Three business days of notice are needed for general medication refill requests.   Five business days of notice are needed for controlled substance refill requests.   If you need to change to a different pharmacy, please contact the new pharmacy directly. The new pharmacy will help you get your medications transferred.     Contact Us:  Please call [phone] during business hours (8-5:00 M-F).   If you have medication related questions after clinic hours, or on the weekend, please call [phone].     Financial Assistance [phone]   Medical Records [phone]       MENTAL HEALTH CRISIS RESOURCES:  For a emergency help, please call 911 or go to the nearest Emergency Department.     Emergency Walk-In Options:   EmPATH Unit @ [facility] ([place]): [phone] - Specialized mental health emergency area designed to be calming  [facility] ([place]): [phone]  [facility] ([place]): [phone]  [facility] ([place]): [phone]    [facility] Crisis Information:   [place]: [phone]  [place]: [phone]  [place] (REJI) - Adult: [phone]     Child: [phone]  [place] - Adult: [phone]     Child: [phone]  [place]: [phone]  List of all [facility] resources:   https://[place].[facility]/dhs/people-we-serve/adults/health-care/mental-health/resources/crisis-contacts.jsp    National Crisis Information:   Crisis Text Line: Text  [place]  to 752369  Suicide & Crisis Lifeline: 988  National Suicide Prevention Lifeline: 7-009-803-TALK (1-801.541.9774)       For online chat options, visit https://suicidepreventionlifeline.org/chat/  Poison Control Center: 1-483.949.8189  Trans Lifeline: 1-332.282.9911 - Hotline for transgender people of all ages  The Sergio Project: 3-311-643-6449 - Hotline for LGBT youth     For Non-Emergency Support:   Fast Tracker: Mental Health & Substance Use Disorder Resources -   https://www.TicketflyckStega Networksn.org/

## 2022-09-29 NOTE — PROGRESS NOTES
Beryl Valenzuela is a 32 year old who has consented to receive services via billable video visit.    Pt will join video visit via: OneEyeAnt  If there are problems joining the visit, send backup video invite via: Text to preferred phone: [phone]  Originating Location (patient location): Patient's home  Distant Location (provider location): [facility] [place] [facility]  Will anyone else be joining the video visit? No     Video- Visit Details  Type of service:  video visit for medication management  Time of service:    Video Start Time: 2:20       Video End Time:  3:20  Reason for video visit:  Patient has requested telehealth visit  Originating Site (patient location):  [facility]- [place]   Location- Patient's home  Distant Site (provider location):  [facility]  Mode of Communication:  Video Conference via OneEyeAnt       [facility]  MEDICAL PROGRESS NOTE     CARE TEAM:  PCP- Carlos Cisneros    Psychotherapist- None     Addiction med (Shawna Owens) - Dr. Arias   Beryl Valenzuela is a 32 year old   who uses the name Hanane and pronouns she, her, hers.      DIAGNOSIS     Generalized anxiety disorder with panic  PTSD  Opioid use disorder, severe, in early remission (on agonist therapy, suboxone)  Unspecified depressive disorder: MDD vs persistent depressive disorder vs bipolar II disorder (history of bipolar II disorder)  ADHD per history  Antisocial personality disorder per history  Borderline personality disorder per history     ASSESSMENT     Beryl is a 31yo seen today for follow-up. Since last visit, life stressors remain (ongoing custody issues with her children). With this, anxiety has increased and mood is mostly depressed. Further, Beryl reports ongoing difficulty with focus. Did discuss potential to reduce Wellbutrin in favor of a slight increase Adderall, which could be beneficial for both focus and  anxiety. However, patient will need to have her blood pressure checked before such a change is made.  In the interest of reducing polypharmacy and medication interactions, decreased hydroxyzine to 25 mg BID PRN and gabapentin to 300 mg BID PRN for anxiety. Additionally, counseled patient on potentially dangerous interactions between Suboxone, gabapentin, and tizanidine which can lead to CNS and respiratory depression. She expressed understanding and willingness to participate in medication reduction for safety, including reduction of tizanidine by 1 dose/day. In reviewing current medications, patient reports increased headaches recently, for which she has been taking Imitrex up to 5x/week. Encouraged Beryl to reach out to her PCP to discuss a preventative migraine medication. This writer has alerted Dr. Greene as well. Educated patient on risk of serotoin syndrome when taking Imitrex in combination with other serotonergic medications. No acute safety concerns.     Beryl continues to be an active participant in treatment planning and has been adherent to treatment plan. No missed since appointments since I took over her care. No recent concerns for substance use/misuse. MNPMP indicates taking medications as prescribed. Beryl continued to work to improve her mental health with the ultimate goal of retaining custody of her child.    Future considerations:  -pain management referral within our system (currently seeing addiction med provider through [facility] + separate PCP for pain management that she rarely sees, would likely benefit from single provider prescribing suboxone + any other pain-related medications)  -continue to weigh pros/cons of trial on lower doses of adderall and/or wellbutrin (may be contributing to anxiety, although Beryl does not report this)    MNPMP was checked today:  Indicates taking controlled medication as prescribed.     PLAN                                                                                                                 1) Meds-   - continue Adderall XR 30mg daily for ADHD  - continue bupropion 450mg XL daily for ADHD, mood  - continue mirtazapine 45mg at bedtime for mood  - continue guanfacine 4mg at bedtime for attention, anxiety, sleep (questionable benefit from this)  - continue hydroxyzine to 200 mg at bedtime for sleep, anxiety   - DECREASE hydroxyzine to 25 mg BID PRN for anxiety  - DECREASE gabapentin to 300mg BID PRN for anxiety     - suboxone 8-2mg BID (per addiction med)  - tizanidine (per PCP) ** counseled to reduce by 1 dose/day **   - Imitrex (per PCP) up to 5x/week    2) Psychotherapy- none currently. Previously saw individual therapist through [facility]. Recently completed outpatient therapy through [facility] (dual diagnosis?),  support groups. Has initial psychotherapy visit for October through our clinic    3) Next due-  Labs- PRN  EKG- PRN   Rating scales- PHQ9, GAD7    4) Referrals-  Consult with  re: resources for low cost legal representation    5) Dispo- 9/29    PERTINENT ITEM HISTORY                                                                                          [most recent eval 8/4/22]     Pertinent Items Include: suicide attempt [x1-2 (once at age 6?, once at age 15)], suicidal ideation, psychosis [sxs include auditory & visual hallucinations, in context of substance use], mutiple psychotropic trials , trauma hx, substance use: opiates and heroin and substance use treatment    SUBJECTIVE     Since the time of the last visit:  - turmoil regarding custody continues   - today received  a call about a  who may be able to offer assistance through [facility] (free/low income legal assistance). A  should call her again next week.   - next court in about a month. Was delayed again.  - apparent court order for all mental health notes from last 5 years. She signed [facility] for mental health records. Believes that  "this was ordered by the .    - anxiety has increased  - depression is \"really bad.\" Hard to get out of bed. Hard to find motivation. Is starting to feel angry. This prevously imporoved after DBT. Discussed DBT skills and her desire to repeat a DBT program after ETP is complete.   - difficulty with memory. Feels \"space-y\" and like her brain isn't working. Getting distracted and abandoning tasks. Feels that short term memory isn't working.  - takes Adderall as soon as she wakes up. Lasts 6-8 hours. Takes every day. Once wears off loses motivation, feels tired, loses ability to focus.     Recent Social History: as above    Recent Psych Symptoms:   Depression:  depressed mood, low energy, poor concentration /memory, overwhelmed, dysregulation and irritable  Anxiety:  excessive worry, nervous/overwhelmed and panic  Elevated:  mood dysregulation  Psychosis:  none  Trauma: endorses flashbacks    Recent Substance Use:   Tobacco, caffeine. No other use.    Pertinent Negatives: No suicidal or violent ideation, self-injury and psychosis  Adverse Effects: none     MEDICAL HISTORY and ALLERGY     ALLERGIES: Acetaminophen, Lubricants [personal lubricant], and Vicodin [hydrocodone-acetaminophen]    Patient Active Problem List   Diagnosis     CARDIOVASCULAR SCREENING; LDL GOAL LESS THAN 160     Osteoarthritis     Abnormal Pap smear of cervix     Antisocial personality     Anxiety     Mood disorder (H)     Bipolar disorder (H)     Hypermobility syndrome     Acute right otitis media     Migraine     Neck pain     Hx of drug abuse (H)     Positive SHANTA (antinuclear antibody)     Fibromyalgia     Insomnia     Cluster B personality disorder (H)        MEDICAL REVIEW OF SYSTEMS   Contraception- deferred Pregnant- No    A comprehensive review of systems was performed and is negative other than noted in the HPI.     MEDICATIONS     Current Outpatient Medications   Medication Sig Dispense Refill     acetaminophen (TYLENOL) 500 MG tablet " Take 1-2 tablets (500-1,000 mg) by mouth every 8 hours as needed for mild pain 100 tablet 2     acetaminophen (TYLENOL) 500 MG tablet Take 1 tablet (500 mg) by mouth every 6 hours as needed for pain 60 tablet 1     amphetamine-dextroamphetamine (ADDERALL XR) 30 MG 24 hr capsule Take 1 capsule (30 mg) by mouth daily 30 capsule 0     buPROPion (WELLBUTRIN XL) 150 MG 24 hr tablet Take 1 tablet (150 mg) by mouth every morning Take with one 300mg tablet for total of 450mg daily 30 tablet 1     buPROPion (WELLBUTRIN XL) 300 MG 24 hr tablet Take 1 tablet (300 mg) by mouth every morning Take with one 150mg tablet for total of 450mg daily 30 tablet 1     gabapentin (NEURONTIN) 300 MG capsule TAKE 1 CAPSULE (300 MG) BY MOUTH 3 TIMES DAILY AS NEEDED FOR OTHER (ANXIETY) 90 capsule 0     guanFACINE (TENEX) 2 MG tablet Take 2 tablets (4 mg) by mouth At Bedtime 60 tablet 1     hydrOXYzine (VISTARIL) 50 MG capsule Take 4 capsules (200 mg) by mouth At Bedtime ; In addition, may take 50 mg (1 capsule) up 2 times daily as needed for anxiety 180 capsule 1     hydrOXYzine (VISTARIL) 50 MG capsule Take 1 capsule (50 mg) by mouth 4 times daily as needed for anxiety 120 capsule 1     ibuprofen (ADVIL/MOTRIN) 600 MG tablet Take 1 tablet (600 mg) by mouth every 8 hours as needed for moderate pain (take with food) 100 tablet 1     mirtazapine (REMERON) 45 MG tablet Take 1 tablet (45 mg) by mouth At Bedtime 30 tablet 1     NARCAN 4 MG/0.1ML nasal spray        Prenatal Vit-Fe Fumarate-FA (PNV FOLIC ACID + IRON) 27-1 MG TABS Take 1 each by mouth daily 100 tablet 3     SUBOXONE 4-1 MG per film  (Patient not taking: No sig reported)       SUBOXONE 8-2 MG per film PLACE 1 FILM UNDER THE TONGUE TWICE DAILY. 8MG/2MG FILMS.       SUMAtriptan (IMITREX) 50 MG tablet Route: Take 1 tablet by mouth at onset of headache for migraine. May repeat in 2 hours if needed: max 2/day 18 tablet 5     tiZANidine (ZANAFLEX) 4 MG tablet Take 1 tablet (4 mg) by mouth 3  times daily as needed for muscle spasms 90 tablet 5      VITALS   There were no vitals taken for this visit.    MENTAL STATUS EXAM     Alertness: alert   Appearance: casually groomed  Behavior/Demeanor: cooperative, pleasant and calm, with good  eye contact   Speech: normal and regular rate and rhythm  Language: intact  Psychomotor: normal or unremarkable  Mood: depressed and anxious   Affect: full range; congruent to: mood- yes, content- yes  Thought Process/Associations: unremarkable  Thought Content:  Reports none;  Denies suicidal & violent ideation and delusions  Perception:  Reports none;  Denies hallucinations  Insight: fair  Judgment: fair  Cognition: does  appear grossly intact; formal cognitive testing was not done  Gait and Station: N/A (telehealth)     LABS and DATA     PHQ 5/31/2022 8/4/2022 9/14/2022   PHQ-9 Total Score 12 14 15   Q9: Thoughts of better off dead/self-harm past 2 weeks Not at all Not at all Not at all       No lab results found.  No lab results found.    ECG: none     PSYCHOTROPIC DRUG INTERACTIONS                                       PSYCHCLINICDDI     mirtazapine + suboxone + Adderall +sumatriptan + Wellbutrin = increased risk of sertonin syndrome  Adderall + Wellbutrin: increased exposure of Adderall d/t p450 2d6 interaction  hydroxyzine + Suboxone + mirtazepine: risk of Qtc prolongation   gabapentin + Suboxone + sumatriptan + tizanidine: risk of CNS and respiratory depression    MANAGEMENT:  Monitoring for adverse effects     RISK STATEMENT for SAFETY     Beryl did not appear to be an imminent safety risk to self or others.    TREATMENT RISK STATEMENT: The risks, benefits, alternatives and potential adverse effects have been discussed and are understood by the pt. The pt understands the risks of using street drugs or alcohol. There are no medical contraindications, the pt agrees to treatment with the ability to do so. The pt knows to call the clinic for any problems or to  access emergency care if needed.  Medical and substance use concerns are documented above.  Psychotropic drug interaction check was done, including changes made today.     PROVIDER: Shefali Johnson MD    Patient staffed in clinic with Dr. Eckert who will sign the note.  Supervisor is Dr. Owusu.

## 2022-09-30 ENCOUNTER — MYC MEDICAL ADVICE (OUTPATIENT)
Dept: FAMILY MEDICINE | Facility: CLINIC | Age: 32
End: 2022-09-30

## 2022-10-02 DIAGNOSIS — G89.29 CHRONIC NECK PAIN: ICD-10-CM

## 2022-10-02 DIAGNOSIS — M54.2 CHRONIC NECK PAIN: ICD-10-CM

## 2022-10-08 ENCOUNTER — MYC MEDICAL ADVICE (OUTPATIENT)
Dept: FAMILY MEDICINE | Facility: CLINIC | Age: 32
End: 2022-10-08

## 2022-10-08 DIAGNOSIS — M25.50 POLYARTHRALGIA: Primary | ICD-10-CM

## 2022-10-12 ENCOUNTER — VIRTUAL VISIT (OUTPATIENT)
Dept: PSYCHIATRY | Facility: CLINIC | Age: 32
End: 2022-10-12
Attending: PSYCHIATRY & NEUROLOGY
Payer: COMMERCIAL

## 2022-10-12 DIAGNOSIS — F90.9 ATTENTION DEFICIT HYPERACTIVITY DISORDER (ADHD), UNSPECIFIED ADHD TYPE: ICD-10-CM

## 2022-10-12 PROCEDURE — 99214 OFFICE O/P EST MOD 30 MIN: CPT | Mod: GC | Performed by: STUDENT IN AN ORGANIZED HEALTH CARE EDUCATION/TRAINING PROGRAM

## 2022-10-12 NOTE — PROGRESS NOTES
Beryl Valenzuela is a 32 year old who has consented to receive services via billable video visit.    Pt will join video visit via: [employer]  If there are problems joining the visit, send backup video invite via: Text to preferred phone: [phone]  Originating Location (patient location): Patient's home  Distant Location (provider location): [facility] [place] [facility]  Will anyone else be joining the video visit? No     Video- Visit Details  Type of service:  video visit for medication management  Time of service:    Video Start Time: 12:50       Video End Time:  1:30  Reason for video visit:  Patient has requested telehealth visit  Originating Site (patient location):  [facility] [place]   Location- Patient's home  Distant Site (provider location):  [facility]  Mode of Communication:  Video Conference via [employer]       [facility]  Psychiatry Clinic  MEDICAL PROGRESS NOTE     CARE TEAM:  PCP- Jamison Greene    Psychotherapist- None     Addiction med (Shawna Owens) - Dr. Arias   Beryl Valenzuela is a 32 year old   who uses the name Hanane and pronouns she, her, hers.      DIAGNOSIS     PTSD  Opioid use disorder, severe, in early remission (on agonist therapy, suboxone)  Unspecified depressive disorder: MDD vs persistent depressive disorder vs bipolar II disorder (history of bipolar II disorder)  ADHD per history  Antisocial personality disorder per history  Borderline personality disorder per history     ASSESSMENT     Beryl is a 33yo seen today for follow-up. Since last visit, she has decreased gabapentin, hydroxyzine, tizanidine, and sumatriptan use. She now reports slightly improved mood. Also notes that she is able to think more clearly. However, anxiety unchanged and difficulty with focus remains. Previously discussed potential to reduce Wellbutrin in favor of a slight Adderall increase, which could be beneficial for both  focus and anxiety. Ultimately did not make this adjustment, as patient did not have a recent blood pressure. She has since obtained a blood pressure reading which is within normal. Thus, plan to decrease Wellbutrin to 300 mg daily and increase Adderall to 40 mg daily.     Today, recommended decreasing hydroxyzine to 100 mg at bedtime due to concern for potential QT prolongation. Explained this risk and requested that patient obtain an EKG through primary care (due to transportation issues, which limit patient's ability to have in-person visits at this clinic). Hanane expressed understanding of risks. However, she was not willing to decrease dose at this visit, citing concerns that such a change will be too dysregulating to sleep. She agreed to get an EKG within the next 2 weeks and to discuss a dose decrease at next visit.    Beryl continues to be an active participant in treatment planning and has been adherent to treatment plan. No missed since appointments since I took over her care. No recent concerns for substance use/misuse. MNPMP indicates taking medications as prescribed. Beryl continued to work to improve her mental health with the ultimate goal of retaining custody of her child.    Patient has been counseled on potentially dangerous interactions between Suboxone, gabapentin, and tizanidine, which can lead to CNS and respiratory depression.    No acute safety concerns.     Future considerations:  -pain management referral within our system (currently seeing addiction med provider through [facility] + separate PCP for pain management that she rarely sees, would likely benefit from single provider prescribing suboxone + any other pain-related medications)  -continue to weigh pros/cons of trial on lower doses of adderall and/or wellbutrin (may be contributing to anxiety, although Beryl does not report this)    MNPMP was checked today:  Indicates taking controlled medication as prescribed.     PLAN                                                                                                                 1) Meds-   - INCREASE Adderall XR to 40mg daily for ADHD   - DECREARSE bupropion to 300mg XL daily for ADHD, mood  - continue mirtazapine 45mg at bedtime for mood  - continue guanfacine 4mg at bedtime for attention, anxiety, sleep (questionable benefit from this)  - continue hydroxyzine 200 mg at bedtime for sleep, anxiety (recommended decrease to 100 mg at bedtime)  - continue hydroxyzine 25 mg BID PRN for anxiety  - continue gabapentin 300mg BID PRN for anxiety     - suboxone 8-2mg BID (per addiction med)  - tizanidine (per PCP) ** counseled to reduce by 1 dose/day **   - Imitrex (per PCP) has used once in last 2 weeks    2) Psychotherapy- none currently. Previously saw individual therapist through [facility]. Recently completed outpatient therapy through [facility] (dual diagnosis?),  support groups. Has initial psychotherapy visit for October through our clinic    3) Next due-  Labs- PRN  EKG- PRN   Rating scales- PHQ9, GAD7    4) Referrals-  none    5) Dispo- 10/25/22     PERTINENT ITEM HISTORY                                                                                          [most recent eval 8/4/22]     Pertinent Items Include: suicide attempt [x1-2 (once at age 6?, once at age 15)], suicidal ideation, psychosis [sxs include auditory & visual hallucinations, in context of substance use], mutiple psychotropic trials , trauma hx, substance use: opiates and heroin and substance use treatment    SUBJECTIVE     Since the time of the last visit:  - hasn't been taking gabapentin as frequently (300mg 1-2x daily)   - is taking hydroxyzine 25 mg in the morning instead of 50mg  - trying not to take as tizanidine as frequently  - has taken triptan 1x since last visit (hasn't been needing it)  - reports she is thinking more clearly. Still having a hard tome concentrating, but memory has improved   -  discussed tentative plant to decrease Wellbutrin in favor of increasing Adderall. She did have her blood pressure checked at a pharmacy. 110/79. She has taken higher doses of Adderall in the past. No side effects previously.  - mood is somewhat better. Still doesn't want to get out of bed, doesn't want to be around people.   - pain has increased recently. She does not attribute this to any recent medication changes. Rheumatology evaluation scheduled for June 8th, 2023.  - working in public and being in public is really difficult. Feels that social security would be really helpful to survive. Is in the process of appealing SSDI decision currently   - counseled to have EKG (no EKG in this system, concern that she is taking QT prolonging medications). She will get this through her PCP within the next 2 weeks.   - Hanane is still interested in therapy and is confused as to why her appointment was canceled again. Per chart, appointment was cancelled and patient initiated. Patient did not knowingly cancel and had planned to attend this appointment.    Recent Psych Symptoms:   Depression:  depressed mood, low energy, poor concentration /memory, overwhelmed, dysregulation and irritable  Anxiety:  excessive worry, nervous/overwhelmed and panic  Elevated:  mood dysregulation  Psychosis:  none  Trauma: endorses flashbacks    Recent Substance Use:   Tobacco, caffeine. No other use.    Pertinent Negatives: No suicidal or violent ideation, self-injury and psychosis  Adverse Effects: none     MEDICAL HISTORY and ALLERGY     ALLERGIES: Acetaminophen, Lubricants [personal lubricant], and Vicodin [hydrocodone-acetaminophen]    Patient Active Problem List   Diagnosis     CARDIOVASCULAR SCREENING; LDL GOAL LESS THAN 160     Osteoarthritis     Abnormal Pap smear of cervix     Antisocial personality     Anxiety     Mood disorder (H)     Bipolar disorder (H)     Hypermobility syndrome     Acute right otitis media     Migraine     Neck  pain     Hx of drug abuse (H)     Positive SHANTA (antinuclear antibody)     Fibromyalgia     Insomnia     Cluster B personality disorder (H)        MEDICAL REVIEW OF SYSTEMS   Contraception- deferred Pregnant- No    A comprehensive review of systems was performed and is negative other than noted in the HPI.     MEDICATIONS     Current Outpatient Medications   Medication Sig Dispense Refill     acetaminophen (TYLENOL) 500 MG tablet Take 1-2 tablets (500-1,000 mg) by mouth every 8 hours as needed for mild pain 100 tablet 2     acetaminophen (TYLENOL) 500 MG tablet Take 1 tablet (500 mg) by mouth every 6 hours as needed for pain 60 tablet 1     [START ON 10/22/2022] amphetamine-dextroamphetamine (ADDERALL XR) 30 MG 24 hr capsule Take 1 capsule (30 mg) by mouth daily 30 capsule 0     buPROPion (WELLBUTRIN XL) 150 MG 24 hr tablet Take 1 tablet (150 mg) by mouth every morning Take with one 300mg tablet for total of 450mg daily 30 tablet 1     buPROPion (WELLBUTRIN XL) 300 MG 24 hr tablet Take 1 tablet (300 mg) by mouth every morning Take with one 150mg tablet for total of 450mg daily 30 tablet 1     gabapentin (NEURONTIN) 300 MG capsule Take 1 capsule (300 mg) by mouth 2 times daily as needed for other (anxiety) 60 capsule 0     guanFACINE (TENEX) 2 MG tablet Take 2 tablets (4 mg) by mouth At Bedtime 60 tablet 1     hydrOXYzine (VISTARIL) 25 MG capsule Take 1 capsule (25 mg) by mouth 2 times daily as needed for anxiety 60 capsule 0     hydrOXYzine (VISTARIL) 50 MG capsule Take 2 capsules (100 mg) by mouth At Bedtime 60 capsule 1     ibuprofen (ADVIL/MOTRIN) 600 MG tablet Take 1 tablet (600 mg) by mouth every 8 hours as needed for moderate pain (take with food) 100 tablet 1     mirtazapine (REMERON) 45 MG tablet Take 1 tablet (45 mg) by mouth At Bedtime 30 tablet 1     NARCAN 4 MG/0.1ML nasal spray        Prenatal Vit-Fe Fumarate-FA (PNV FOLIC ACID + IRON) 27-1 MG TABS Take 1 each by mouth daily 100 tablet 3     SUBOXONE 4-1  MG per film  (Patient not taking: No sig reported)       SUBOXONE 8-2 MG per film PLACE 1 FILM UNDER THE TONGUE TWICE DAILY. 8MG/2MG FILMS.       SUMAtriptan (IMITREX) 50 MG tablet Route: Take 1 tablet by mouth at onset of headache for migraine. May repeat in 2 hours if needed: max 2/day 18 tablet 5     tiZANidine (ZANAFLEX) 4 MG tablet TAKE 1 TABLET(4 MG) BY MOUTH THREE TIMES DAILY AS NEEDED FOR MUSCLE SPASMS 90 tablet 5      VITALS   There were no vitals taken for this visit.    MENTAL STATUS EXAM     Alertness: alert   Appearance: casually groomed  Behavior/Demeanor: cooperative, pleasant and calm, with good  eye contact   Speech: normal and regular rate and rhythm  Language: intact  Psychomotor: normal or unremarkable  Mood: depressed and anxious   Affect: full range; congruent to: mood- yes, content- yes  Thought Process/Associations: unremarkable  Thought Content:  Reports none;  Denies suicidal & violent ideation and delusions  Perception:  Reports none;  Denies hallucinations  Insight: fair  Judgment: fair  Cognition: does  appear grossly intact; formal cognitive testing was not done  Gait and Station: N/A ([facility])     LABS and DATA     PHQ 5/31/2022 8/4/2022 9/14/2022   PHQ-9 Total Score 12 14 15   Q9: Thoughts of better off dead/self-harm past 2 weeks Not at all Not at all Not at all       No lab results found.  No lab results found.    ECG: none     PSYCHOTROPIC DRUG INTERACTIONS                                       PSYCHCLINICDDI     mirtazapine + suboxone + Adderall +sumatriptan + Wellbutrin = increased risk of sertonin syndrome  Adderall + Wellbutrin: increased exposure of Adderall d/t p450 2d6 interaction  hydroxyzine + Suboxone + mirtazepine: risk of Qtc prolongation   gabapentin + Suboxone + sumatriptan + tizanidine: risk of CNS and respiratory depression    MANAGEMENT:  Monitoring for adverse effects     RISK STATEMENT for SAFETY     Beryl did not appear to be an imminent safety risk to  self or others.    TREATMENT RISK STATEMENT: The risks, benefits, alternatives and potential adverse effects have been discussed and are understood by the pt. The pt understands the risks of using street drugs or alcohol. There are no medical contraindications, the pt agrees to treatment with the ability to do so. The pt knows to call the clinic for any problems or to access emergency care if needed.  Medical and substance use concerns are documented above.  Psychotropic drug interaction check was done, including changes made today.     PROVIDER: Shefali Johnson MD    Patient staffed in clinic with Dr. Castanon who will sign the note.  Supervisor is Dr. Owusu.      I directly participated in the patient interview with the resident and discussed the key portions of the service with the resident, including the mental status examination and developing the plan of care. I reviewed key portions of the history with the resident. I agree with the findings and plan as documented in this note.      Servando Castanon MD  [facility]

## 2022-10-12 NOTE — NURSING NOTE
Pt ould like Acetaminophen as well as Hydrocodone/acetaminophen (Norco) Removed from her Allergy /Contraindication section.     Pt also reported having Difficulties getting her Prenatal Vitamins refilled at drugstores.The pharmacy is cashing them out instead of finding a covered option or getting a Prior Auth.

## 2022-10-13 ENCOUNTER — MYC MEDICAL ADVICE (OUTPATIENT)
Dept: FAMILY MEDICINE | Facility: CLINIC | Age: 32
End: 2022-10-13

## 2022-10-13 RX ORDER — DEXTROAMPHETAMINE SACCHARATE, AMPHETAMINE ASPARTATE MONOHYDRATE, DEXTROAMPHETAMINE SULFATE AND AMPHETAMINE SULFATE 2.5; 2.5; 2.5; 2.5 MG/1; MG/1; MG/1; MG/1
10 CAPSULE, EXTENDED RELEASE ORAL DAILY
Qty: 14 CAPSULE | Refills: 0 | Status: SHIPPED | OUTPATIENT
Start: 2022-10-13 | End: 2022-10-17

## 2022-10-15 ENCOUNTER — MYC MEDICAL ADVICE (OUTPATIENT)
Dept: PSYCHIATRY | Facility: CLINIC | Age: 32
End: 2022-10-15

## 2022-10-15 DIAGNOSIS — F90.9 ATTENTION DEFICIT HYPERACTIVITY DISORDER (ADHD), UNSPECIFIED ADHD TYPE: ICD-10-CM

## 2022-10-15 DIAGNOSIS — M35.7 HYPERMOBILITY SYNDROME: ICD-10-CM

## 2022-10-17 RX ORDER — DEXTROAMPHETAMINE SACCHARATE, AMPHETAMINE ASPARTATE MONOHYDRATE, DEXTROAMPHETAMINE SULFATE AND AMPHETAMINE SULFATE 7.5; 7.5; 7.5; 7.5 MG/1; MG/1; MG/1; MG/1
30 CAPSULE, EXTENDED RELEASE ORAL DAILY
Qty: 30 CAPSULE | Refills: 0 | Status: SHIPPED | OUTPATIENT
Start: 2022-10-22 | End: 2022-11-10

## 2022-10-17 RX ORDER — DEXTROAMPHETAMINE SACCHARATE, AMPHETAMINE ASPARTATE MONOHYDRATE, DEXTROAMPHETAMINE SULFATE AND AMPHETAMINE SULFATE 2.5; 2.5; 2.5; 2.5 MG/1; MG/1; MG/1; MG/1
10 CAPSULE, EXTENDED RELEASE ORAL DAILY
Qty: 30 CAPSULE | Refills: 0 | Status: SHIPPED | OUTPATIENT
Start: 2022-10-17 | End: 2022-11-10

## 2022-10-17 RX ORDER — IBUPROFEN 600 MG/1
TABLET, FILM COATED ORAL
Qty: 60 TABLET | Refills: 1 | Status: SHIPPED | OUTPATIENT
Start: 2022-10-17 | End: 2023-01-12

## 2022-10-17 NOTE — CONFIDENTIAL NOTE
Per patient:     Current Adderall XR 10 mg rx ordered for #14 on 10/13/22 - currently on file at [facility], however they do not have it in stock. Patient requests that both the 10 mg and 30 mg prescriptions be sent to [facility] in [place].      Next fill date of 30 mg is 10/22.  10 mg is available to be filled today, as she has not started taking it yet.    Revised prescriptions sent to provider for review.  **10 mg prescription was for #`14 capsules.  Updated to #30 here.      Sent to provider for review.    
Writer updated patient - prescriptions sent to requested pharmacy.    
None

## 2022-10-17 NOTE — TELEPHONE ENCOUNTER
Reviewed rx orders request for Adderall XR 30, #30, and Adderall XR 10mg, #30, and performed chart review including note attached in this encounter and 10/12/2022 Psy Prog Note by Me[facility] Lg.    Plan:  I signed the rx orders.    Piotr Owusu MD

## 2022-10-21 NOTE — TELEPHONE ENCOUNTER
Please assist patient with provider appointment to do an EKG per psychiatrist.  Claire Lockett BSN, RN

## 2022-10-25 ENCOUNTER — VIRTUAL VISIT (OUTPATIENT)
Dept: PSYCHIATRY | Facility: CLINIC | Age: 32
End: 2022-10-25
Attending: PSYCHIATRY & NEUROLOGY
Payer: COMMERCIAL

## 2022-10-25 DIAGNOSIS — F90.9 ATTENTION DEFICIT HYPERACTIVITY DISORDER (ADHD), UNSPECIFIED ADHD TYPE: ICD-10-CM

## 2022-10-25 DIAGNOSIS — M35.7 HYPERMOBILITY SYNDROME: ICD-10-CM

## 2022-10-25 DIAGNOSIS — F41.1 GAD (GENERALIZED ANXIETY DISORDER): ICD-10-CM

## 2022-10-25 DIAGNOSIS — F43.10 PTSD (POST-TRAUMATIC STRESS DISORDER): Primary | ICD-10-CM

## 2022-10-25 PROCEDURE — 99214 OFFICE O/P EST MOD 30 MIN: CPT | Mod: 93 | Performed by: STUDENT IN AN ORGANIZED HEALTH CARE EDUCATION/TRAINING PROGRAM

## 2022-10-25 RX ORDER — HYDROXYZINE PAMOATE 50 MG/1
150 CAPSULE ORAL AT BEDTIME
Qty: 90 CAPSULE | Refills: 1 | Status: SHIPPED | OUTPATIENT
Start: 2022-10-25 | End: 2022-11-30

## 2022-10-25 RX ORDER — HYDROXYZINE PAMOATE 25 MG/1
25 CAPSULE ORAL 2 TIMES DAILY PRN
Qty: 60 CAPSULE | Refills: 0 | Status: SHIPPED | OUTPATIENT
Start: 2022-10-25 | End: 2022-11-10

## 2022-10-25 RX ORDER — GUANFACINE 2 MG/1
4 TABLET ORAL AT BEDTIME
Qty: 60 TABLET | Refills: 1 | Status: SHIPPED | OUTPATIENT
Start: 2022-10-25 | End: 2022-11-30

## 2022-10-25 RX ORDER — GABAPENTIN 300 MG/1
300 CAPSULE ORAL 2 TIMES DAILY PRN
Qty: 60 CAPSULE | Refills: 0 | Status: SHIPPED | OUTPATIENT
Start: 2022-10-25 | End: 2022-11-30

## 2022-10-25 RX ORDER — MIRTAZAPINE 45 MG/1
45 TABLET, FILM COATED ORAL AT BEDTIME
Qty: 30 TABLET | Refills: 1 | Status: SHIPPED | OUTPATIENT
Start: 2022-10-25 | End: 2022-11-30

## 2022-10-25 RX ORDER — BUPROPION HYDROCHLORIDE 300 MG/1
300 TABLET ORAL EVERY MORNING
Qty: 30 TABLET | Refills: 1 | Status: SHIPPED | OUTPATIENT
Start: 2022-10-25 | End: 2022-11-30

## 2022-10-25 ASSESSMENT — PATIENT HEALTH QUESTIONNAIRE - PHQ9
10. IF YOU CHECKED OFF ANY PROBLEMS, HOW DIFFICULT HAVE THESE PROBLEMS MADE IT FOR YOU TO DO YOUR WORK, TAKE CARE OF THINGS AT HOME, OR GET ALONG WITH OTHER PEOPLE: VERY DIFFICULT
SUM OF ALL RESPONSES TO PHQ QUESTIONS 1-9: 10
SUM OF ALL RESPONSES TO PHQ QUESTIONS 1-9: 10

## 2022-10-25 NOTE — PATIENT INSTRUCTIONS
**For crisis resources, please see the information at the end of this document**   Patient Education    Thank you for coming to the [facility] [place] [facility].     Lab Testing:  If you had lab testing today and your results are reassuring or normal they will be mailed to you or sent through [employer] within 7 days. If the lab tests need quick action we will call you with the results. The phone number we will call with results is # [phone]. If this is not the best number please call our clinic and change the number.     Medication Refills:  If you need any refills please call your pharmacy and they will contact us. Our fax number for refills is [phone].   Three business days of notice are needed for general medication refill requests.   Five business days of notice are needed for controlled substance refill requests.   If you need to change to a different pharmacy, please contact the new pharmacy directly. The new pharmacy will help you get your medications transferred.     Contact Us:  Please call [phone] during business hours (8-5:00 M-F).   If you have medication related questions after clinic hours, or on the weekend, please call [phone].     Financial Assistance [phone]   Medical Records [phone]       MENTAL HEALTH CRISIS RESOURCES:  For a emergency help, please call 911 or go to the nearest Emergency Department.     Emergency Walk-In Options:   [facility] Unit @ [facility] ([place]): [phone] - Specialized mental health emergency area designed to be calming  [facility] ([place]): [phone]  [facility] ([place]): [phone]  [facility]): [phone]    [facility] Crisis Information:   [place]: [phone]  [place]: [phone]  [place] ([place]) - Adult: [phone]     Child: [phone]  [place] - Adult: [phone]     Child: [phone]  [place]:  [phone]  List of all [facility] resources:   https://[place].gov/dhs/people-we-serve/adults/health-care/mental-health/resources/crisis-contacts.jsp    National Crisis Information:   Crisis Text Line: Text  [place]  to 301421  Suicide & Crisis Lifeline: 988  National Suicide Prevention Lifeline: 1-987-438-TALK (1-118.484.3825)       For online chat options, visit https://suicidepreventionlifeline.org/chat/  Poison Control Center: 5-952-067-4623  Trans Lifeline: 3-709-169-7012 - Hotline for transgender people of all ages  The [employer]: 1-390-319-9879 - Hotline for LGBT youth     For Non-Emergency Support:   Fast Tracker: Mental Health & Substance Use Disorder Resources -   https://www.[employer].org/

## 2022-10-25 NOTE — PROGRESS NOTES
Beryl Valenzuela is a 32 year old who is being evaluated via a billable video visit.      Pt will join video visit via: [employer]  If there are problems joining the visit, send backup video invite via: Text to preferred phone: [phone]    Reason for telehealth visit: Patient has requested telehealth visit    Originating location (patient location): Patient's home    Will anyone else be joining the visit? No      Answers for HPI/ROS submitted by the patient on 10/25/2022  If you checked off any problems, how difficult have these problems made it for you to do your work, take care of things at home, or get along with other people?: Very difficult  PHQ9 TOTAL SCORE: 10

## 2022-10-25 NOTE — PROGRESS NOTES
Beryl Valenzuela is a 32 year old who has consented to receive services via billable video visit.    Pt will join video visit via: Sellywhere  If there are problems joining the visit, send backup video invite via: Text to preferred phone: [phone]  Originating Location (patient location): Patient's home  Distant Location (provider location): [facility] MENTAL [facility] ADDICTION [facility]  Will anyone else be joining the video visit? No     Telephone- Visit Details  Type of service:  video visit for medication management  Time of service:    Video Start Time: 9:50      Video End Time:  10:30   Reason for video visit:  Patient has requested telehealth visit  Originating Site (patient location):  [facility]   Location- Patient's home  Distant Site (provider location):  [facility]  Mode of Communication:  Video Conference via Other: telephone     Note: converted to telephone after patient had technical difficulties with video.       [facility]iatry [facility]  MEDICAL PROGRESS NOTE     CARE TEAM:  PCP- Jamison Greene    Psychotherapist- None     Addiction med (Shawna Owens) - Dr. Arias   Beryl Valenzuela is a 32 year old   who uses the name Hanane and pronouns she, her, hers.      DIAGNOSIS     PTSD  Opioid use disorder, severe, in early remission (on agonist therapy, suboxone)  Unspecified depressive disorder: MDD vs persistent depressive disorder vs bipolar II disorder (history of bipolar II disorder)  ADHD per history  Antisocial personality disorder per history  Borderline personality disorder per history     ASSESSMENT     Beryl is a 31yo seen today for follow-up. She is doing well overall. Mood and anxiety slightly improved. She also notes that she is able to think more clearly since decreasing hydroxyzine, gabapentin, and Wellbutrin + increasing Adderall.    Today, recommended decreasing hydroxyzine at bedtime as patient is currently  "taking quite a high dose. Explained risk of anticholinergic side effect at high dose in addition to concern for QT prolongation. Patient expressed understanding and willingness to decrease the dose. Previously counseled patient to obtain an EKG through primary care (appointment schedule 11/18/22).    Also discussed request for \"psychiatric evaluation.\" Beryl gave verbal consent to discuss this request with /Counselor for ETP (Kenna [phone]). Will send RUDOLPH as well.    Hanane remains motivated to pursue therapy. Referral placed today.    Beryl continues to be an active participant in treatment planning and has been adherent to treatment plan. No missed since appointments since I took over her care. No recent concerns for substance use/misuse. MNPMP indicates taking medications as prescribed. Beryl continued to work to improve her mental health with the ultimate goal of retaining custody of her child.    Patient has been counseled on potentially dangerous interactions between Suboxone, gabapentin, and tizanidine, which can lead to CNS and respiratory depression.    No acute safety concerns.     Future considerations:  -pain management referral within our system (currently seeing addiction med provider through [facility] + separate PCP for pain management that she rarely sees, would likely benefit from single provider prescribing suboxone + any other pain-related medications)  -continue to weigh pros/cons of trial on lower doses of adderall and/or wellbutrin (may be contributing to anxiety, although Beryl does not report this)    MNPMP was checked today:  Indicates taking controlled medication as prescribed.     PLAN                                                                                                                1) Meds-   - continue Adderall XR 40mg daily for ADHD  - continue bupropion 300mg XL daily for ADHD, mood  - continue mirtazapine 45mg at bedtime for mood  - continue " "guanfacine 4mg at bedtime for attention, anxiety, sleep (questionable benefit from this)  - DECREASE hydroxyzine  mg at bedtime for sleep, anxiety  - continue hydroxyzine 25 mg BID PRN for anxiety  - continue gabapentin 300mg BID PRN for anxiety     - suboxone 8-2mg BID (per addiction med)  - tizanidine (per PCP) ** counseled to reduce by 1 dose/day **   - Imitrex (per PCP) has used once in last 2 weeks    2) Psychotherapy- none currently. Previously saw individual therapist through [facility]. Recently completed outpatient therapy through [facility] (dual diagnosis?),  support groups. Has initial psychotherapy visit for October through our clinic    3) Next due-  Labs- PRN  EKG- PRN   Rating scales- PHQ9, GAD7    4) Referrals-  Psychotherapy referral (have placed referrals previously. Patient has been scheduled for therapy in the past. These have been cancelled for unknown reason.)     5) Dispo- 11/10/22 at 12:50    PERTINENT ITEM HISTORY                                                                                          [most recent eval 8/4/22]     Pertinent Items Include: suicide attempt [x1-2 (once at age 6?, once at age 15)], suicidal ideation, psychosis [sxs include auditory & visual hallucinations, in context of substance use], mutiple psychotropic trials , trauma hx, substance use: opiates and heroin and substance use treatment    SUBJECTIVE     Since the time of the last visit:   - \"doing pretty good actually.\"  - patient previously requested a \"psychiatric evaluation\" for ETP. Discussed further to clarify. Hanane reports that /Counselor for ETP (Kenan) has requested this evaluation. Reports that Kenna does not believe Hanane has a mental illness. Believes Hanane \"manipulated doctors\" and would like an outside opinion. Beryl does not trust that an evaluation through a provider recommended by Kenna will be fair and objective.    - Hanane remains motivated to pursue " therapy on her own, but much prefers to have all care within the [facility] system. She is frustrated that past psychotherapy referrals have resulted in repeated cancelled appointments (for unknown reason).  - No updates regarding court/custody. Has a court date set on November 9th. Aunt is helping get all paperwork together.   - spoke with [facility]. Hoped they would take her case. Only able to offer some advice and then close case. Hanane is still in contact with a .   - reports she is thinking more clearly. Still having a hard tome concentrating, but memory has improved (she wonders if decreased dose of  gabapentin and/or hydroxyzine has caused this)  - anxiety has improved. Still high, but better than previous.   - sleep is ok. Still waking up every couple hours.    - mood is somewhat better. Still doesn't want to get out of bed  - denies trouble urinating, though does note that when she starts urinating it does sometimes take time. Denies blurred vision.      Recent Psych Symptoms:   Depression:  depressed mood, low energy, poor concentration /memory, overwhelmed, dysregulation and irritable  Anxiety:  excessive worry, nervous/overwhelmed and panic  Elevated:  mood dysregulation  Psychosis:  none  Trauma: endorses flashbacks    Recent Substance Use:   Tobacco, caffeine. No other use.    Pertinent Negatives: No suicidal or violent ideation, self-injury and psychosis  Adverse Effects: none     MEDICAL HISTORY and ALLERGY     ALLERGIES: Acetaminophen, Lubricants [personal lubricant], and Vicodin [hydrocodone-acetaminophen]    Patient Active Problem List   Diagnosis     CARDIOVASCULAR SCREENING; LDL GOAL LESS THAN 160     Osteoarthritis     Abnormal Pap smear of cervix     Antisocial personality     Anxiety     Mood disorder (H)     Bipolar disorder (H)     Hypermobility syndrome     Acute right otitis media     Migraine     Neck pain     Hx of drug abuse (H)     Positive SHANTA (antinuclear  antibody)     Fibromyalgia     Insomnia     Cluster B personality disorder (H)        MEDICAL REVIEW OF SYSTEMS   Contraception- deferred Pregnant- No    A comprehensive review of systems was performed and is negative other than noted in the HPI.     MEDICATIONS     Current Outpatient Medications   Medication Sig Dispense Refill     acetaminophen (TYLENOL) 500 MG tablet Take 1 tablet (500 mg) by mouth every 6 hours as needed for pain 60 tablet 1     amphetamine-dextroamphetamine (ADDERALL XR) 10 MG 24 hr capsule Take 1 capsule (10 mg) by mouth daily In addition to 30mg capsule for a total daily dose of 40mg 30 capsule 0     amphetamine-dextroamphetamine (ADDERALL XR) 30 MG 24 hr capsule Take 1 capsule (30 mg) by mouth daily 30 capsule 0     buPROPion (WELLBUTRIN XL) 300 MG 24 hr tablet Take 1 tablet (300 mg) by mouth every morning Take with one 150mg tablet for total of 450mg daily 30 tablet 1     gabapentin (NEURONTIN) 300 MG capsule Take 1 capsule (300 mg) by mouth 2 times daily as needed for other (anxiety) 60 capsule 0     guanFACINE (TENEX) 2 MG tablet Take 2 tablets (4 mg) by mouth At Bedtime 60 tablet 1     hydrOXYzine (VISTARIL) 25 MG capsule Take 1 capsule (25 mg) by mouth 2 times daily as needed for anxiety 60 capsule 0     hydrOXYzine (VISTARIL) 50 MG capsule Take 2 capsules (100 mg) by mouth At Bedtime 60 capsule 1     ibuprofen (ADVIL/MOTRIN) 600 MG tablet TAKE 1 TABLET BY MOUTH EVERY 6 HOURS AS NEEDED FOR MODERATE PAIN 60 tablet 1     mirtazapine (REMERON) 45 MG tablet Take 1 tablet (45 mg) by mouth At Bedtime 30 tablet 1     NARCAN 4 MG/0.1ML nasal spray        Prenatal Vit-Fe Fumarate-FA (PNV FOLIC ACID + IRON) 27-1 MG TABS Take 1 each by mouth daily 100 tablet 3     SUBOXONE 8-2 MG per film PLACE 1 FILM UNDER THE TONGUE TWICE DAILY. 8MG/2MG FILMS.       SUMAtriptan (IMITREX) 50 MG tablet Route: Take 1 tablet by mouth at onset of headache for migraine. May repeat in 2 hours if needed: max 2/day 18  tablet 5     tiZANidine (ZANAFLEX) 4 MG tablet TAKE 1 TABLET(4 MG) BY MOUTH THREE TIMES DAILY AS NEEDED FOR MUSCLE SPASMS 90 tablet 5      VITALS   There were no vitals taken for this visit.    MENTAL STATUS EXAM     Alertness: alert   Appearance: N/A (phone visit)  Behavior/Demeanor: cooperative, pleasant and calm, with N/A (phone visit) eye contact   Speech: normal and regular rate and rhythm  Language: intact  Psychomotor: N/A (phone visit)  Mood: depressed and anxious   Affect: full range; congruent to: mood- yes, content- yes  Thought Process/Associations: unremarkable  Thought Content:  Reports none;  Denies suicidal & violent ideation and delusions  Perception:  Reports none;  Denies hallucinations  Insight: fair  Judgment: fair  Cognition: does  appear grossly intact; formal cognitive testing was not done  Gait and Station: N/A (telehealth)     LABS and DATA     PHQ 5/31/2022 8/4/2022 9/14/2022   PHQ-9 Total Score 12 14 15   Q9: Thoughts of better off dead/self-harm past 2 weeks Not at all Not at all Not at all       No lab results found.  No lab results found.    ECG: none     PSYCHOTROPIC DRUG INTERACTIONS                                       PSYCHCLINICDDI     mirtazapine + suboxone + Adderall +sumatriptan + Wellbutrin = increased risk of sertonin syndrome  Adderall + Wellbutrin: increased exposure of Adderall d/t p450 2d6 interaction  hydroxyzine + Suboxone + mirtazepine: risk of Qtc prolongation   gabapentin + Suboxone + sumatriptan + tizanidine: risk of CNS and respiratory depression    MANAGEMENT:  Monitoring for adverse effects     RISK STATEMENT for SAFETY     Beryl did not appear to be an imminent safety risk to self or others.    TREATMENT RISK STATEMENT: The risks, benefits, alternatives and potential adverse effects have been discussed and are understood by the pt. The pt understands the risks of using street drugs or alcohol. There are no medical contraindications, the pt agrees to  treatment with the ability to do so. The pt knows to call the clinic for any problems or to access emergency care if needed.  Medical and substance use concerns are documented above.  Psychotropic drug interaction check was done, including changes made today.     PROVIDER: Shefali Johnson MD    Patient staffed in clinic with Dr. Garcia who will sign the note.  Supervisor is Dr. Owusu.

## 2022-11-01 DIAGNOSIS — G89.29 CHRONIC NECK PAIN: ICD-10-CM

## 2022-11-01 DIAGNOSIS — F90.9 ATTENTION DEFICIT HYPERACTIVITY DISORDER (ADHD), UNSPECIFIED ADHD TYPE: ICD-10-CM

## 2022-11-01 DIAGNOSIS — M54.2 CHRONIC NECK PAIN: ICD-10-CM

## 2022-11-01 RX ORDER — GUANFACINE 2 MG/1
TABLET ORAL
Qty: 60 TABLET | Refills: 1 | OUTPATIENT
Start: 2022-11-01

## 2022-11-07 ENCOUNTER — VIRTUAL VISIT (OUTPATIENT)
Dept: PSYCHOLOGY | Facility: CLINIC | Age: 32
End: 2022-11-07
Payer: COMMERCIAL

## 2022-11-07 ENCOUNTER — MYC MEDICAL ADVICE (OUTPATIENT)
Dept: PSYCHIATRY | Facility: CLINIC | Age: 32
End: 2022-11-07

## 2022-11-07 DIAGNOSIS — F41.1 GENERALIZED ANXIETY DISORDER WITH PANIC ATTACKS: Primary | ICD-10-CM

## 2022-11-07 DIAGNOSIS — F43.10 PTSD (POST-TRAUMATIC STRESS DISORDER): ICD-10-CM

## 2022-11-07 DIAGNOSIS — F11.21 OPIOID USE DISORDER, SEVERE, IN EARLY REMISSION (H): ICD-10-CM

## 2022-11-07 DIAGNOSIS — F32.A DEPRESSION, UNSPECIFIED DEPRESSION TYPE: ICD-10-CM

## 2022-11-07 DIAGNOSIS — F41.0 GENERALIZED ANXIETY DISORDER WITH PANIC ATTACKS: Primary | ICD-10-CM

## 2022-11-07 PROCEDURE — 90834 PSYTX W PT 45 MINUTES: CPT | Mod: 95 | Performed by: COUNSELOR

## 2022-11-07 ASSESSMENT — ANXIETY QUESTIONNAIRES
1. FEELING NERVOUS, ANXIOUS, OR ON EDGE: NEARLY EVERY DAY
7. FEELING AFRAID AS IF SOMETHING AWFUL MIGHT HAPPEN: NEARLY EVERY DAY
5. BEING SO RESTLESS THAT IT IS HARD TO SIT STILL: NEARLY EVERY DAY
8. IF YOU CHECKED OFF ANY PROBLEMS, HOW DIFFICULT HAVE THESE MADE IT FOR YOU TO DO YOUR WORK, TAKE CARE OF THINGS AT HOME, OR GET ALONG WITH OTHER PEOPLE?: EXTREMELY DIFFICULT
4. TROUBLE RELAXING: NEARLY EVERY DAY
GAD7 TOTAL SCORE: 19
6. BECOMING EASILY ANNOYED OR IRRITABLE: SEVERAL DAYS
GAD7 TOTAL SCORE: 19
1. FEELING NERVOUS, ANXIOUS, OR ON EDGE: NEARLY EVERY DAY
4. TROUBLE RELAXING: NEARLY EVERY DAY
GAD7 TOTAL SCORE: 19
GAD7 TOTAL SCORE: 19
8. IF YOU CHECKED OFF ANY PROBLEMS, HOW DIFFICULT HAVE THESE MADE IT FOR YOU TO DO YOUR WORK, TAKE CARE OF THINGS AT HOME, OR GET ALONG WITH OTHER PEOPLE?: EXTREMELY DIFFICULT
IF YOU CHECKED OFF ANY PROBLEMS ON THIS QUESTIONNAIRE, HOW DIFFICULT HAVE THESE PROBLEMS MADE IT FOR YOU TO DO YOUR WORK, TAKE CARE OF THINGS AT HOME, OR GET ALONG WITH OTHER PEOPLE: EXTREMELY DIFFICULT
5. BEING SO RESTLESS THAT IT IS HARD TO SIT STILL: NEARLY EVERY DAY
GAD7 TOTAL SCORE: 19
7. FEELING AFRAID AS IF SOMETHING AWFUL MIGHT HAPPEN: NEARLY EVERY DAY
GAD7 TOTAL SCORE: 19
7. FEELING AFRAID AS IF SOMETHING AWFUL MIGHT HAPPEN: NEARLY EVERY DAY
7. FEELING AFRAID AS IF SOMETHING AWFUL MIGHT HAPPEN: NEARLY EVERY DAY
IF YOU CHECKED OFF ANY PROBLEMS ON THIS QUESTIONNAIRE, HOW DIFFICULT HAVE THESE PROBLEMS MADE IT FOR YOU TO DO YOUR WORK, TAKE CARE OF THINGS AT HOME, OR GET ALONG WITH OTHER PEOPLE: EXTREMELY DIFFICULT
2. NOT BEING ABLE TO STOP OR CONTROL WORRYING: NEARLY EVERY DAY
3. WORRYING TOO MUCH ABOUT DIFFERENT THINGS: NEARLY EVERY DAY
3. WORRYING TOO MUCH ABOUT DIFFERENT THINGS: NEARLY EVERY DAY
6. BECOMING EASILY ANNOYED OR IRRITABLE: SEVERAL DAYS
2. NOT BEING ABLE TO STOP OR CONTROL WORRYING: NEARLY EVERY DAY

## 2022-11-08 NOTE — PROGRESS NOTES
[facility]                                     Progress Note    Patient Name: Beryl Valenzuela  Date: 11/7/22         Service Type: Individual      Session Start Time: 3:00pm  Session End Time: 3:52pm     Session Length: 52 minutes    Session #: 1    Attendees: Client attended alone    Service Modality:  Video Visit:      Provider verified identity through the following two step process.  Patient provided:  Patient was verified at admission/transfer    Telemedicine Visit: The patient's condition can be safely assessed and treated via synchronous audio and visual telemedicine encounter.      Reason for Telemedicine Visit: Patient has requested telehealth visit    Originating Site (Patient Location): Patient's home    Distant Site (Provider Location): Provider Remote Setting- Home Office    Consent:  The patient/guardian has verbally consented to: the potential risks and benefits of telemedicine (video visit) versus in person care; bill my insurance or make self-payment for services provided; and responsibility for payment of non-covered services.     Patient would like the video invitation sent by:  My Chart    Mode of Communication:  Video Conference via Amwell    Distant Location (Provider):  Off-site    As the provider I attest to compliance with applicable laws and regulations related to telemedicine.    DATA  Interactive Complexity: No  Crisis: No        Progress Since Last Session (Related to Symptoms / Goals / Homework):   Symptoms: First session.    Homework: First session.      Episode of Care Goals: First session.     Current / Ongoing Stressors and Concerns:   Patient completed a mental health diagnostic assessment on 8/4/22 with Shefali Johnson MD. Patient reported she is participating in the ETP through [facility] which is a mandated outpatient [facility] treatment program. Patient previously completed residential treatment and reported her last date of illicit opiate use as  11/7/21. Patient reported she is involved in a custody dispute with her youngest daughter which is a primary stressor. Patient reported her tentative goals for therapy are to process her stressors and to increase her motivation to complete tasks.     Treatment Objective(s) Addressed in This Session:   Explore current mental health concerns and begin setting therapy goals.        Intervention:   Interpersonal Therapy: rapport building; Psychodynamic: processed internal experiences; Person-Centered: promoted positive wellbeing;    Assessments completed prior to visit:      PHQ9:   PHQ-9 SCORE 7/11/2013 11/2/2021 4/26/2022 5/31/2022 8/4/2022 9/14/2022 10/25/2022   PHQ-9 Total Score 22 - - - - - -   PHQ-9 Total Score MyChart - - 23 (Severe depression) 12 (Moderate depression) 14 (Moderate depression) 15 (Moderately severe depression) 10 (Moderate depression)   PHQ-9 Total Score - 21 23 12 14 15 10     GAD2:   JEREMY-2 4/26/2022 5/31/2022 8/4/2022 9/14/2022 9/29/2022 11/7/2022 11/7/2022   Feeling nervous, anxious, or on edge 3 3 3 3 3 3 3   Not being able to stop or control worrying 3 3 3 3 3 3 3   JEREMY-2 Total Score 6 6 6 6 6 6 6     GAD7:   JEREMY-7 SCORE 4/26/2022 5/31/2022 8/4/2022 9/14/2022 9/29/2022 11/7/2022 11/7/2022   Total Score - - - - - - -   Total Score 21 (severe anxiety) 15 (severe anxiety) 17 (severe anxiety) 17 (severe anxiety) 18 (severe anxiety) - 19 (severe anxiety)   Total Score 21 15 17 17 18 19 19     CAGE-AID:   CAGE-AID Total Score 11/7/2022   Total Score 4   Total Score MyChart 4 (A total score of 2 or greater is considered clinically significant)     PROMIS 10-Global Health (only subscores and total score):   PROMIS-10 Scores Only 4/26/2022 8/4/2022 11/7/2022 11/7/2022   Global Mental Health Score 7 6 6 6   Global Physical Health Score 9 9 8 8   PROMIS TOTAL - SUBSCORES 16 15 14 14         ASSESSMENT: Current Emotional / Mental Status (status of significant symptoms):   Risk status (Self / Other harm  or suicidal ideation)   Patient denies current fears or concerns for personal safety.   Patient denies current or recent suicidal ideation or behaviors.   Patient denies current or recent homicidal ideation or behaviors.   Patient denies current or recent self injurious behavior or ideation.   Patient denies other safety concerns.   Patient reports there has been no change in risk factors since their last session.     Patient reports there has been no change in protective factors since their last session.     Recommended that patient call 911 or go to the local ED should there be a change in any of these risk factors.     Appearance:   Appropriate    Eye Contact:   Good    Psychomotor Behavior: Normal    Attitude:   Cooperative    Orientation:   All   Speech    Rate / Production: Normal/ Responsive Normal     Volume:  Normal    Mood:    Irritable  Normal   Affect:    Appropriate    Thought Content:  Clear    Thought Form:  Coherent    Insight:    Good  and Fair      Medication Review:   No changes to current psychiatric medication(s) reported.     Medication Compliance:   Did not discuss.     Changes in Health Issues:   None reported     Chemical Use Review:   Substance Use: Chemical use reviewed, no active concerns identified      Nicotine Use: yes    Diagnosis:  PTSD (post-traumatic stress disorder); Generalized anxiety disorder with panic attacks; Opioid use disorder, severe, in early remission (H); Depression, unspecified depression type    Collateral Reports Completed:   Not Applicable    PLAN: (Patient Tasks / Therapist Tasks / Other)  Patient to practice coping skills to manage anxiety about upcoming custody hearing. Patient to attend follow up therapy appointment on 11/14 at 11:00am. Treatment plan to be completed by end of next session.        MITCH Crocker, [facility]  Note was reviewed and clinical supervision provided by Moreno Lanza Psy.D, ASHER  November 16, 2022

## 2022-11-10 ENCOUNTER — VIRTUAL VISIT (OUTPATIENT)
Dept: PSYCHIATRY | Facility: CLINIC | Age: 32
End: 2022-11-10
Attending: PSYCHIATRY & NEUROLOGY
Payer: COMMERCIAL

## 2022-11-10 DIAGNOSIS — F41.1 GAD (GENERALIZED ANXIETY DISORDER): ICD-10-CM

## 2022-11-10 DIAGNOSIS — F90.9 ATTENTION DEFICIT HYPERACTIVITY DISORDER (ADHD), UNSPECIFIED ADHD TYPE: ICD-10-CM

## 2022-11-10 DIAGNOSIS — M35.7 HYPERMOBILITY SYNDROME: ICD-10-CM

## 2022-11-10 PROCEDURE — 99214 OFFICE O/P EST MOD 30 MIN: CPT | Mod: GT | Performed by: STUDENT IN AN ORGANIZED HEALTH CARE EDUCATION/TRAINING PROGRAM

## 2022-11-10 RX ORDER — HYDROXYZINE PAMOATE 25 MG/1
25 CAPSULE ORAL 2 TIMES DAILY PRN
Qty: 60 CAPSULE | Refills: 0 | Status: SHIPPED | OUTPATIENT
Start: 2022-11-10 | End: 2022-11-30

## 2022-11-10 RX ORDER — DEXTROAMPHETAMINE SACCHARATE, AMPHETAMINE ASPARTATE MONOHYDRATE, DEXTROAMPHETAMINE SULFATE AND AMPHETAMINE SULFATE 2.5; 2.5; 2.5; 2.5 MG/1; MG/1; MG/1; MG/1
10 CAPSULE, EXTENDED RELEASE ORAL DAILY
Qty: 30 CAPSULE | Refills: 0 | Status: SHIPPED | OUTPATIENT
Start: 2022-11-18 | End: 2022-11-30

## 2022-11-10 RX ORDER — DEXTROAMPHETAMINE SACCHARATE, AMPHETAMINE ASPARTATE MONOHYDRATE, DEXTROAMPHETAMINE SULFATE AND AMPHETAMINE SULFATE 7.5; 7.5; 7.5; 7.5 MG/1; MG/1; MG/1; MG/1
30 CAPSULE, EXTENDED RELEASE ORAL DAILY
Qty: 30 CAPSULE | Refills: 0 | Status: SHIPPED | OUTPATIENT
Start: 2022-11-25 | End: 2022-11-30

## 2022-11-10 NOTE — PATIENT INSTRUCTIONS
**For crisis resources, please see the information at the end of this document**   Patient Education    Thank you for coming to the [facility] & [facility] [place] [facility].     Lab Testing:  If you had lab testing today and your results are reassuring or normal they will be mailed to you or sent through Liquid within 7 days. If the lab tests need quick action we will call you with the results. The phone number we will call with results is # [phone]. If this is not the best number please call our clinic and change the number.     Medication Refills:  If you need any refills please call your pharmacy and they will contact us. Our fax number for refills is [phone].   Three business days of notice are needed for general medication refill requests.   Five business days of notice are needed for controlled substance refill requests.   If you need to change to a different pharmacy, please contact the new pharmacy directly. The new pharmacy will help you get your medications transferred.     Contact Us:  Please call [phone] during business hours (8-5:00 M-F).   If you have medication related questions after clinic hours, or on the weekend, please call [phone].     Financial Assistance [phone]   Medical Records [phone]       MENTAL HEALTH CRISIS RESOURCES:  For a emergency help, please call 911 or go to the nearest Emergency Department.     Emergency Walk-In Options:   EmPATH Unit @ [facility] ([place]): [phone] - Specialized mental health emergency area designed to be calming  [facility] ([place]): [phone]  [facility] ([place]): [phone]  [facility]): [phone]    [facility] Crisis Information:   [place]: [phone]  [place]: [phone]  [place] ([place]) - Adult: [phone]     Child: [phone]  [place] - Adult: [phone]     Child: [phone]  [place]:  [phone]  List of all [facility] resources:   https://[place].gov/dhs/people-we-serve/adults/health-care/mental-health/resources/crisis-contacts.jsp    National Crisis Information:   Crisis Text Line: Text  [place]  to 932414  Suicide & Crisis Lifeline: 988  National Suicide Prevention Lifeline: 7-241-654-TALK (1-724.327.4748)       For online chat options, visit https://suicidepreventionlifeline.org/chat/  Poison Control Center: 7-763-518-6522  Trans Lifeline: 8-408-101-5909 - Hotline for transgender people of all ages  The Sergio Project: 7-060-359-6038 - Hotline for LGBT youth     For Non-Emergency Support:   Fast Tracker: Mental Health & Substance Use Disorder Resources -   https://www.[employer]ck9sky.comn.org/

## 2022-11-10 NOTE — PROGRESS NOTES
Beryl Valenzuela is a 32 year old who has consented to receive services via billable video visit.    Pt will join video visit via: [employer]  If there are problems joining the visit, send backup video invite via: Text to preferred phone: [phone]  Originating Location (patient location): Patient's home  Distant Location (provider location): [facility] MENTAL [facility] & ADDICTION [facility]  Will anyone else be joining the video visit? No     Telephone- Visit Details  Type of service:  video visit for medication management  Time of service:    Video Start Time: 12:50      Video End Time:  1:30  Reason for video visit:  Patient has requested telehealth visit  Originating Site (patient location):  [facility]   Location- Patient's home  Distant Site (provider location):  [facility]  Mode of Communication:  Video Conference via Other: telephone     Note: converted to telephone after patient had technical difficulties with video.       [facility]  Psychiatry Clinic  MEDICAL PROGRESS NOTE     CARE TEAM:  PCP- Jamison Greene    Psychotherapist- yes     Addiction med (Shawna Owens) - Dr. Arias       Beryl Valenzuela is a 32 year old who uses the name Hanane and pronouns she, her, hers.      DIAGNOSIS     PTSD  Opioid use disorder, severe, in early remission (on agonist therapy, suboxone)  Unspecified depressive disorder: MDD vs persistent depressive disorder vs bipolar II disorder (history of bipolar II disorder)  ADHD per history  Antisocial personality disorder per history  Borderline personality disorder per history     ASSESSMENT     Beryl is a 33yo seen today for follow-up. She is doing well overall. Mood and anxiety slightly improved since completion of court for custody of her daughter yesterday. No medication changes today. No acute safety concerns.     Beryl continues to be an active participant in treatment planning and has been  adherent to treatment plan. No missed since appointments since I took over her care. No recent concerns for substance use/misuse. MNPMP indicates taking medications as prescribed. Beryl continued to work to improve her mental health with the ultimate goal of retaining custody of her child.    Patient has been counseled on potentially dangerous interactions between Suboxone, gabapentin, and tizanidine, which can lead to CNS and respiratory depression.    Future considerations:  -pain management referral within our system (currently seeing addiction med provider through [facility] + separate PCP for pain management that she rarely sees, would likely benefit from single provider prescribing suboxone + any other pain-related medications)  -continue to weigh pros/cons of trial on lower doses of adderall and/or wellbutrin (may be contributing to anxiety, although Beryl does not report this)    MNPMP was checked today:  Indicates taking controlled medication as prescribed.     PLAN                                                                                                                1) Meds-  - continue Adderall XR 40mg daily for ADHD  - continue bupropion 300mg XL daily for ADHD, mood  - continue mirtazapine 45mg at bedtime for mood  - continue guanfacine 4mg at bedtime for attention, anxiety, sleep (questionable benefit from this)  - continue hydroxyzine 150 mg at bedtime for sleep, anxiety  - continue hydroxyzine 25 mg BID PRN for anxiety  - continue gabapentin 300mg BID PRN for anxiety     - suboxone 8-2mg BID (per addiction med)  - tizanidine (per PCP) ** counseled to reduce by 1 dose/day **   - Imitrex (per PCP) has used once in last 2 weeks    2) Psychotherapy- Has recently established with an individual psychotherapist.    3) Next due-  Labs- PRN  EKG- EKG through primary care (appointment schedule 11/18/22)  Rating scales- PHQ9, GAD7    4) Referrals-  Psychotherapy referral (have placed referrals  "previously. Patient has been scheduled for therapy in the past. These have been cancelled for unknown reason.)     5) Dispo- November 30th at 12:50    PERTINENT ITEM HISTORY                                                                                          [most recent eval 8/4/22]     Pertinent Items Include: suicide attempt [x1-2 (once at age 6?, once at age 15)], suicidal ideation, psychosis [sxs include auditory & visual hallucinations, in context of substance use], mutiple psychotropic trials , trauma hx, substance use: opiates and heroin and substance use treatment    SUBJECTIVE     Since the time of the last visit:  - trial yesterday. Reached agreement for joint physical custody. Father of child has sole legal custody until 2026.   - \"I'm just glad that its over.\"   - states overall she is feeling better.  - conflict with ETP counselor continues  - has started therapy. Is very excited about this. Feels therapist is a good fit. Feels therapist can relate to her.     Recent Psych Symptoms:   Depression:  depressed mood, low energy, poor concentration /memory, overwhelmed, dysregulation and irritable  Anxiety:  excessive worry, nervous/overwhelmed and panic  Elevated:  mood dysregulation  Psychosis:  none  Trauma: endorses flashbacks    Recent Substance Use:   Tobacco, caffeine. No other use.    Pertinent Negatives: No suicidal or violent ideation, self-injury and psychosis  Adverse Effects: none     MEDICAL HISTORY and ALLERGY     ALLERGIES: Acetaminophen, Lubricants [personal lubricant], and Vicodin [hydrocodone-acetaminophen]    Patient Active Problem List   Diagnosis     CARDIOVASCULAR SCREENING; LDL GOAL LESS THAN 160     Osteoarthritis     Abnormal Pap smear of cervix     Antisocial personality     Anxiety     Mood disorder (H)     Bipolar disorder (H)     Hypermobility syndrome     Acute right otitis media     Migraine     Neck pain     Hx of drug abuse (H)     Positive SHANTA (antinuclear antibody) "     Fibromyalgia     Insomnia     Cluster B personality disorder (H)        MEDICAL REVIEW OF SYSTEMS   Contraception- deferred Pregnant- No    A comprehensive review of systems was performed and is negative other than noted in the HPI.     MEDICATIONS     Current Outpatient Medications   Medication Sig Dispense Refill     acetaminophen (TYLENOL) 500 MG tablet Take 1 tablet (500 mg) by mouth every 6 hours as needed for pain 60 tablet 1     amphetamine-dextroamphetamine (ADDERALL XR) 10 MG 24 hr capsule Take 1 capsule (10 mg) by mouth daily In addition to 30mg capsule for a total daily dose of 40mg 30 capsule 0     amphetamine-dextroamphetamine (ADDERALL XR) 30 MG 24 hr capsule Take 1 capsule (30 mg) by mouth daily 30 capsule 0     buPROPion (WELLBUTRIN XL) 300 MG 24 hr tablet Take 1 tablet (300 mg) by mouth every morning Take with one 150mg tablet for total of 450mg daily 30 tablet 1     gabapentin (NEURONTIN) 300 MG capsule Take 1 capsule (300 mg) by mouth 2 times daily as needed for other (anxiety) 60 capsule 0     guanFACINE (TENEX) 2 MG tablet Take 2 tablets (4 mg) by mouth At Bedtime 60 tablet 1     hydrOXYzine (VISTARIL) 25 MG capsule Take 1 capsule (25 mg) by mouth 2 times daily as needed for anxiety 60 capsule 0     hydrOXYzine (VISTARIL) 50 MG capsule Take 3 capsules (150 mg) by mouth At Bedtime 90 capsule 1     ibuprofen (ADVIL/MOTRIN) 600 MG tablet TAKE 1 TABLET BY MOUTH EVERY 6 HOURS AS NEEDED FOR MODERATE PAIN 60 tablet 1     mirtazapine (REMERON) 45 MG tablet Take 1 tablet (45 mg) by mouth At Bedtime 30 tablet 1     NARCAN 4 MG/0.1ML nasal spray        Prenatal Vit-Fe Fumarate-FA (PNV FOLIC ACID + IRON) 27-1 MG TABS Take 1 each by mouth daily 100 tablet 3     SUBOXONE 8-2 MG per film PLACE 1 FILM UNDER THE TONGUE TWICE DAILY. 8MG/2MG FILMS.       SUMAtriptan (IMITREX) 50 MG tablet Route: Take 1 tablet by mouth at onset of headache for migraine. May repeat in 2 hours if needed: max 2/day 18 tablet 5      "tiZANidine (ZANAFLEX) 4 MG tablet TAKE ONE TABLET BY MOUTH THREE TIMES DAILY AS NEEDED 90 tablet 5      VITALS   There were no vitals taken for this visit.    MENTAL STATUS EXAM     Alertness: alert   Appearance: N/A (phone visit) and casually groomed  Behavior/Demeanor: cooperative, pleasant and calm, with good  eye contact   Speech: normal and regular rate and rhythm  Language: intact  Psychomotor: normal or unremarkable  Mood: \"better\"   Affect: full range; congruent to: mood- yes, content- yes  Thought Process/Associations: unremarkable  Thought Content:  Reports none;  Denies suicidal & violent ideation and delusions  Perception:  Reports none;  Denies hallucinations  Insight: fair  Judgment: fair  Cognition: does  appear grossly intact; formal cognitive testing was not done  Gait and Station: N/A (telehealth)     LABS and DATA     PHQ 8/4/2022 9/14/2022 10/25/2022   PHQ-9 Total Score 14 15 10   Q9: Thoughts of better off dead/self-harm past 2 weeks Not at all Not at all Not at all       No lab results found.  No lab results found.    ECG: none     PSYCHOTROPIC DRUG INTERACTIONS                                       PSYCHCLINICDDI     mirtazapine + suboxone + Adderall +sumatriptan + Wellbutrin = increased risk of sertonin syndrome  Adderall + Wellbutrin: increased exposure of Adderall d/t p450 2d6 interaction  hydroxyzine + Suboxone + mirtazepine: risk of Qtc prolongation   gabapentin + Suboxone + sumatriptan + tizanidine: risk of CNS and respiratory depression    MANAGEMENT:  Monitoring for adverse effects     RISK STATEMENT for SAFETY     Beryl did not appear to be an imminent safety risk to self or others.    TREATMENT RISK STATEMENT: The risks, benefits, alternatives and potential adverse effects have been discussed and are understood by the pt. The pt understands the risks of using street drugs or alcohol. There are no medical contraindications, the pt agrees to treatment with the ability to do so. The " pt knows to call the clinic for any problems or to access emergency care if needed.  Medical and substance use concerns are documented above.  Psychotropic drug interaction check was done, including changes made today.     PROVIDER: Shefali Johnson MD    Patient staffed in clinic with Dr. Owusu who will sign the note.  Supervisor is Dr. Owusu.

## 2022-11-14 PROBLEM — F41.1 GENERALIZED ANXIETY DISORDER WITH PANIC ATTACKS: Status: ACTIVE | Noted: 2022-11-14

## 2022-11-14 PROBLEM — F11.21 OPIOID USE DISORDER, SEVERE, IN EARLY REMISSION (H): Status: ACTIVE | Noted: 2022-11-14

## 2022-11-14 PROBLEM — F41.0 GENERALIZED ANXIETY DISORDER WITH PANIC ATTACKS: Status: ACTIVE | Noted: 2022-11-14

## 2022-11-14 PROBLEM — F43.10 PTSD (POST-TRAUMATIC STRESS DISORDER): Status: ACTIVE | Noted: 2022-11-14

## 2022-11-17 NOTE — PROGRESS NOTES
ASSESSMENT / PLAN:  (F11.21) Opioid use disorder, severe, in early remission (H)  (primary encounter diagnosis)  Comment: stable  Plan: per pain specialsit.     (Z12.4) Cervical cancer screening  Plan: patient would like ob/gyn - set-up. Overdue. Will get birth control if desired. Not currently active. Probably should get in future with meds    (M35.7) Hypermobility syndrome  Comment: stable  Plan: acetaminophen (TYLENOL) 500 MG tablet        Prn.   Recheck in 6 months for fasting labs/etc. Call/email with questions/concerns     ekg ok. Continue meds. No symptoms. Chest pain or shortness of breath or prolonged palpitations to er.     Christo Cordoba is a 32 year old presenting for the following health issues:  History adhd, depression, bipolar and migraines. Needs ekg.  No family history mi <40. Mom alive. Unclear of dad's side. Half-slblings ok.   Normal blood pressure in past. No intesested in quiting smoking. No chest pain or shortness of breath.   Exercise - active.   Emotionally doing ok. Not interested in birth control or depo.   No std concerns. Due  For pap.   Needs refill on tylenol. Imitrix.   No palpitations/ LIGHTHEADED.   No chief complaint on file.      History of Present Illness       Reason for visit:  Med dr requested    She eats 2-3 servings of fruits and vegetables daily.She consumes 1 sweetened beverage(s) daily.She exercises with enough effort to increase her heart rate 30 to 60 minutes per day.  She exercises with enough effort to increase her heart rate 7 days per week.   She is taking medications regularly.    Today's PHQ-9         PHQ-9 Total Score: 9    PHQ-9 Q9 Thoughts of better off dead/self-harm past 2 weeks :   Not at all    How difficult have these problems made it for you to do your work, take care of things at home, or get along with other people: Somewhat difficult       Review of Systems         Objective    There were no vitals taken for this visit.  There is no height  "or weight on file to calculate BMI.   /61   Pulse 78   Temp 96.9  F (36.1  C) (Tympanic)   Resp 16   Ht 1.562 m (5' 1.5\")   Wt 66 kg (145 lb 9.6 oz)   SpO2 99%   BMI 27.07 kg/m      Physical Exam   GENERAL: healthy, alert and no distress  EYES: Eyes grossly normal to inspection, PERRL and conjunctivae and sclerae normal  NECK: no adenopathy, no asymmetry, masses, or scars and thyroid normal to palpation  RESP: lungs clear to auscultation - no rales, rhonchi or wheezes  CV: regular rate and rhythm, normal S1 S2, no S3 or S4, no murmur, click or rub, no peripheral edema and peripheral pulses strong  ABDOMEN: soft, nontender, no hepatosplenomegaly, no masses and bowel sounds normal  MS: no gross musculoskeletal defects noted, no edema  NEURO: Normal strength and tone, mentation intact and speech normal  PSYCH: mentation appears normal, affect normal/bright          "

## 2022-11-18 ENCOUNTER — OFFICE VISIT (OUTPATIENT)
Dept: FAMILY MEDICINE | Facility: CLINIC | Age: 32
End: 2022-11-18
Payer: COMMERCIAL

## 2022-11-18 VITALS
HEIGHT: 62 IN | TEMPERATURE: 96.9 F | BODY MASS INDEX: 26.79 KG/M2 | DIASTOLIC BLOOD PRESSURE: 61 MMHG | WEIGHT: 145.6 LBS | HEART RATE: 78 BPM | RESPIRATION RATE: 16 BRPM | OXYGEN SATURATION: 99 % | SYSTOLIC BLOOD PRESSURE: 107 MMHG

## 2022-11-18 DIAGNOSIS — M35.7 HYPERMOBILITY SYNDROME: ICD-10-CM

## 2022-11-18 DIAGNOSIS — F11.21 OPIOID USE DISORDER, SEVERE, IN EARLY REMISSION (H): Primary | ICD-10-CM

## 2022-11-18 DIAGNOSIS — Z12.4 CERVICAL CANCER SCREENING: ICD-10-CM

## 2022-11-18 PROCEDURE — 90686 IIV4 VACC NO PRSV 0.5 ML IM: CPT | Performed by: FAMILY MEDICINE

## 2022-11-18 PROCEDURE — 99213 OFFICE O/P EST LOW 20 MIN: CPT | Mod: 25 | Performed by: FAMILY MEDICINE

## 2022-11-18 PROCEDURE — 93000 ELECTROCARDIOGRAM COMPLETE: CPT | Performed by: FAMILY MEDICINE

## 2022-11-18 PROCEDURE — 90471 IMMUNIZATION ADMIN: CPT | Performed by: FAMILY MEDICINE

## 2022-11-18 RX ORDER — ACETAMINOPHEN 500 MG
500 TABLET ORAL EVERY 6 HOURS PRN
Qty: 120 TABLET | Refills: 3 | Status: SHIPPED | OUTPATIENT
Start: 2022-11-18 | End: 2023-02-17

## 2022-11-18 ASSESSMENT — PAIN SCALES - GENERAL: PAINLEVEL: SEVERE PAIN (6)

## 2022-11-18 ASSESSMENT — PATIENT HEALTH QUESTIONNAIRE - PHQ9
SUM OF ALL RESPONSES TO PHQ QUESTIONS 1-9: 9
SUM OF ALL RESPONSES TO PHQ QUESTIONS 1-9: 9
10. IF YOU CHECKED OFF ANY PROBLEMS, HOW DIFFICULT HAVE THESE PROBLEMS MADE IT FOR YOU TO DO YOUR WORK, TAKE CARE OF THINGS AT HOME, OR GET ALONG WITH OTHER PEOPLE: SOMEWHAT DIFFICULT

## 2022-11-30 ENCOUNTER — VIRTUAL VISIT (OUTPATIENT)
Dept: PSYCHIATRY | Facility: CLINIC | Age: 32
End: 2022-11-30
Attending: PSYCHIATRY & NEUROLOGY
Payer: COMMERCIAL

## 2022-11-30 DIAGNOSIS — M35.7 HYPERMOBILITY SYNDROME: ICD-10-CM

## 2022-11-30 DIAGNOSIS — F90.9 ATTENTION DEFICIT HYPERACTIVITY DISORDER (ADHD), UNSPECIFIED ADHD TYPE: ICD-10-CM

## 2022-11-30 DIAGNOSIS — F41.1 GAD (GENERALIZED ANXIETY DISORDER): ICD-10-CM

## 2022-11-30 PROCEDURE — 99443 PR PHYSICIAN TELEPHONE EVALUATION 21-30 MIN: CPT | Mod: 93 | Performed by: STUDENT IN AN ORGANIZED HEALTH CARE EDUCATION/TRAINING PROGRAM

## 2022-11-30 RX ORDER — HYDROXYZINE PAMOATE 50 MG/1
150 CAPSULE ORAL AT BEDTIME
Qty: 90 CAPSULE | Refills: 1 | Status: SHIPPED | OUTPATIENT
Start: 2022-11-30 | End: 2022-12-29

## 2022-11-30 RX ORDER — HYDROXYZINE PAMOATE 25 MG/1
25 CAPSULE ORAL DAILY PRN
Qty: 30 CAPSULE | Refills: 0 | Status: SHIPPED | OUTPATIENT
Start: 2022-11-30 | End: 2022-12-29

## 2022-11-30 RX ORDER — BUPROPION HYDROCHLORIDE 300 MG/1
300 TABLET ORAL EVERY MORNING
Qty: 30 TABLET | Refills: 0
Start: 2022-11-30 | End: 2022-12-29

## 2022-11-30 RX ORDER — GUANFACINE 2 MG/1
4 TABLET ORAL AT BEDTIME
Qty: 60 TABLET | Refills: 1 | Status: SHIPPED | OUTPATIENT
Start: 2022-11-30 | End: 2022-12-29

## 2022-11-30 RX ORDER — GABAPENTIN 300 MG/1
300 CAPSULE ORAL 2 TIMES DAILY PRN
Qty: 60 CAPSULE | Refills: 0 | Status: SHIPPED | OUTPATIENT
Start: 2022-11-30 | End: 2022-12-29

## 2022-11-30 RX ORDER — MIRTAZAPINE 45 MG/1
45 TABLET, FILM COATED ORAL AT BEDTIME
Qty: 30 TABLET | Refills: 1 | Status: SHIPPED | OUTPATIENT
Start: 2022-11-30 | End: 2022-12-29

## 2022-11-30 ASSESSMENT — PATIENT HEALTH QUESTIONNAIRE - PHQ9
10. IF YOU CHECKED OFF ANY PROBLEMS, HOW DIFFICULT HAVE THESE PROBLEMS MADE IT FOR YOU TO DO YOUR WORK, TAKE CARE OF THINGS AT HOME, OR GET ALONG WITH OTHER PEOPLE: SOMEWHAT DIFFICULT
SUM OF ALL RESPONSES TO PHQ QUESTIONS 1-9: 11
SUM OF ALL RESPONSES TO PHQ QUESTIONS 1-9: 11

## 2022-11-30 NOTE — PATIENT INSTRUCTIONS
[employer]   [employer]  [employer]    Tell them that the [facility] department of psychiatry is referring you to their law clinics     **For crisis resources, please see the information at the end of this document**   Patient Education    Thank you for coming to the [facility] HEALTH & ADDI[facility] [place] [facility].     Lab Testing:  If you had lab testing today and your results are reassuring or normal they will be mailed to you or sent through [employer] within 7 days. If the lab tests need quick action we will call you with the results. The phone number we will call with results is # [phone]. If this is not the best number please call our clinic and change the number.     Medication Refills:  If you need any refills please call your pharmacy and they will contact us. Our fax number for refills is [phone].   Three business days of notice are needed for general medication refill requests.   Five business days of notice are needed for controlled substance refill requests.   If you need to change to a different pharmacy, please contact the new pharmacy directly. The new pharmacy will help you get your medications transferred.     Contact Us:  Please call [phone] during business hours (8-5:00 M-F).   If you have medication related questions after clinic hours, or on the weekend, please call [phone].     Financial Assistance [phone]   Medical Records [phone]       MENTAL HEALTH CRISIS RESOURCES:  For a emergency help, please call 911 or go to the nearest Emergency Department.     Emergency Walk-In Options:   EmPATH Unit @ [facility] ([place]): [phone] - Specialized mental health emergency area designed to be calming  [facility] ([place]): [phone]  [facility] [place] [place] [facility] ([place]): [phone]  [facility] ([place]):  [phone]    [facility] Crisis Information:   [place]: [phone]  [place]: [phone]  [place] (REJI) - Adult: [phone]     Child: [phone]  Jeff - Adult: [phone]     Child: [phone]  [place]: [phone]  List of all [facility] resources:   https://[place].gov/dhs/people-we-serve/adults/health-care/mental-health/resources/crisis-contacts.jsp    National Crisis Information:   Crisis Text Line: Text  [place]  to 163683  Suicide & Crisis Lifeline: 988  National Suicide Prevention Lifeline: 7-137-746-TALK (1-785.745.3838)       For online chat options, visit https://suicidepreventionlifeline.org/chat/  Poison Control Center: 1-520.337.3172  Trans Lifeline: 1-595.878.6995 - Hotline for transgender people of all ages  The [employer]: 1-270-380-5735 - Hotline for LGBT youth     For Non-Emergency Support:   Fast Tracker: Mental Health & Substance Use Disorder Resources -   https://www.[employer].org/

## 2022-11-30 NOTE — PROGRESS NOTES
Beryl Valenzuela is a 32 year old who is being evaluated via a billable video visit.      Pt will join video visit via: [employer]  If there are problems joining the visit, send backup video invite via: Text to preferred phone: [phone]    Reason for telehealth visit: Patient has requested telehealth visit    Originating location (patient location): Patient's home    Will anyone else be joining the visit? No    Telephone- Visit Details  Type of service:  video visit for medication management  Time of service:    Video Start Time: 2:52     Video End Time:  3:30  Reason for video visit:  Patient has requested telehealth visit  Originating Site (patient location):  [facility]   Location- Patient's home  Distant Site (provider location):  [facility]  Mode of Communication:  Video Conference via Other: telephone     Note: converted to telephone after patient had technical difficulties with video.       [facility]  Psychiatry Clinic  MEDICAL PROGRESS NOTE     CARE TEAM:  PCP- Jamison Greene    Psychotherapist- yes     Addiction med (Shawna Owens) - Dr. Arias       Beryl Valenzuela is a 32 year old who uses the name Hanane and pronouns she, her, hers.      DIAGNOSIS     PTSD  Opioid use disorder, severe, in early remission (on agonist therapy, suboxone)  Unspecified depressive disorder: MDD vs persistent depressive disorder vs bipolar II disorder (history of bipolar II disorder)  ADHD per history  Antisocial personality disorder per history  Borderline personality disorder per history     ASSESSMENT     Beryl is a 31yo seen today for follow-up. She is doing well overall. Psychosocial stressors appear to be primary  of anxiety at present. Mood and anxiety slightly improved since completion of court for custody of her daughter. Given this, and in the spirit of reducing polypharmacy, will change current PRN hydroxyzine dose to once daily. Patient reports that  she is maintaining sobriety. No acute safety concerns.     Beryl continues to be an active participant in treatment planning and has been adherent to treatment plan. No missed since appointments since I took over her care. No recent concerns for substance use/misuse. MNPMP indicates taking medications as prescribed. Beryl continued to work to improve her mental health with the ultimate goal of retaining custody of her child.    Patient has been counseled on potentially dangerous interactions between Suboxone, gabapentin, and tizanidine, which can lead to CNS and respiratory depression.Future considerations:    -pain management referral within our system (currently seeing addiction med provider through [facility] + separate PCP for pain management that she rarely sees, would likely benefit from single provider prescribing suboxone + any other pain-related medications)  -continue to weigh pros/cons of trial on lower doses of adderall and/or wellbutrin (may be contributing to anxiety, although Beryl does not report this)    MNPMP was checked today:  Indicates taking controlled medication as prescribed.     PLAN                                                                                                                1) Meds-   - continue Adderall XR 40mg daily for ADHD  - continue bupropion 300mg XL daily for ADHD, mood  - continue mirtazapine 45mg at bedtime for mood  - continue guanfacine 4mg at bedtime for attention, anxiety, sleep (questionable benefit from this)  - continue hydroxyzine 150 mg at bedtime for sleep, anxiety  - DECREASE hydroxyzine 25 mg FROM BID PRN TO daily PRN for anxiety  - continue gabapentin 300mg BID PRN for anxiety      - suboxone 8-2mg BID (per addiction med)  - tizanidine (per PCP) ** counseled to reduce by 1 dose/day **   - Imitrex (per PCP) has used once in last 2 weeks    2) Psychotherapy- Has recently established with an individual psychotherapist.    3) Next due-  Labs-  "PRN  EKG- PRN  Rating scales- PHQ9, GAD7    4) Referrals-  none     5) Dispo- December 29th    PERTINENT ITEM HISTORY                                                                                          [most recent eval 8/4/22]     Pertinent Items Include: suicide attempt [x1-2 (once at age 6?, once at age 15)], suicidal ideation, psychosis [sxs include auditory & visual hallucinations, in context of substance use], mutiple psychotropic trials , trauma hx, substance use: opiates and heroin and substance use treatment    SUBJECTIVE     Since the time of the last visit:  - \"doing really well.\"   - no longer enrolled in [facility]. , , and therapist agreed that [facility] wasn't a good fit. She notes that the  ordered her to have an assessment and that the program did an intake instead. Thus, she shouldn't have been enrolled in [facility] in the first place  - less worried/stressed/upset now that [facility] is over.  - still feeling that court went well overall.  - ongoing issues regarding custody of her daughter (Lenore). Lenore's father (Tristen) has a  who is drawing up the legal paperwork. Beryl doesn't trust the way the paperwork is written. Discussed going to a law clinic ([employer] [employer] vs. [facility] [employer] vs. [employer] [facility] [employer]) for assistance in reviewing the document before she signs.  - She has maintained sobriety for 1 year and 1 day today.    Recent Psych Symptoms:   Depression:  poor concentration /memory and overwhelmed  Anxiety:  excessive worry and nervous/overwhelmed  Elevated:  mood dysregulation  Psychosis:  none  Trauma: endorses flashbacks    Recent Substance Use:   Tobacco, caffeine. No other use.    Pertinent Negatives: No suicidal or violent ideation, self-injury and psychosis  Adverse Effects: none     MEDICAL HISTORY and ALLERGY     ALLERGIES: Acetaminophen, Lubricants [personal lubricant], and Vicodin " [hydrocodone-acetaminophen]    Patient Active Problem List   Diagnosis     CARDIOVASCULAR SCREENING; LDL GOAL LESS THAN 160     Osteoarthritis     Abnormal Pap smear of cervix     Antisocial personality     Anxiety     Depression, unspecified depression type     Bipolar disorder (H)     Hypermobility syndrome     Acute right otitis media     Migraine     Neck pain     Hx of drug abuse (H)     Positive SHANTA (antinuclear antibody)     Fibromyalgia     Insomnia     Cluster B personality disorder (H)     PTSD (post-traumatic stress disorder)     Generalized anxiety disorder with panic attacks     Opioid use disorder, severe, in early remission (H)      MEDICAL REVIEW OF SYSTEMS   Contraception- deferred Pregnant- No    A comprehensive review of systems was performed and is negative other than noted in the HPI.     MEDICATIONS     Current Outpatient Medications   Medication Sig Dispense Refill     acetaminophen (TYLENOL) 500 MG tablet Take 1 tablet (500 mg) by mouth every 6 hours as needed for pain 120 tablet 3     amphetamine-dextroamphetamine (ADDERALL XR) 10 MG 24 hr capsule Take 1 capsule (10 mg) by mouth daily In addition to 30mg capsule for a total daily dose of 40mg 30 capsule 0     amphetamine-dextroamphetamine (ADDERALL XR) 30 MG 24 hr capsule Take 1 capsule (30 mg) by mouth daily 30 capsule 0     buPROPion (WELLBUTRIN XL) 300 MG 24 hr tablet Take 1 tablet (300 mg) by mouth every morning Take with one 150mg tablet for total of 450mg daily 30 tablet 1     gabapentin (NEURONTIN) 300 MG capsule Take 1 capsule (300 mg) by mouth 2 times daily as needed for other (anxiety) 60 capsule 0     guanFACINE (TENEX) 2 MG tablet Take 2 tablets (4 mg) by mouth At Bedtime 60 tablet 1     hydrOXYzine (VISTARIL) 25 MG capsule Take 1 capsule (25 mg) by mouth 2 times daily as needed for anxiety 60 capsule 0     hydrOXYzine (VISTARIL) 50 MG capsule Take 3 capsules (150 mg) by mouth At Bedtime 90 capsule 1     ibuprofen (ADVIL/MOTRIN)  "600 MG tablet TAKE 1 TABLET BY MOUTH EVERY 6 HOURS AS NEEDED FOR MODERATE PAIN 60 tablet 1     mirtazapine (REMERON) 45 MG tablet Take 1 tablet (45 mg) by mouth At Bedtime 30 tablet 1     NARCAN 4 MG/0.1ML nasal spray  (Patient not taking: Reported on 11/18/2022)       Prenatal Vit-Fe Fumarate-FA (PNV FOLIC ACID + IRON) 27-1 MG TABS Take 1 each by mouth daily 100 tablet 3     SUBOXONE 8-2 MG per film PLACE 1 FILM UNDER THE TONGUE TWICE DAILY. 8MG/2MG FILMS.       SUMAtriptan (IMITREX) 50 MG tablet Route: Take 1 tablet by mouth at onset of headache for migraine. May repeat in 2 hours if needed: max 2/day 18 tablet 5     tiZANidine (ZANAFLEX) 4 MG tablet TAKE ONE TABLET BY MOUTH THREE TIMES DAILY AS NEEDED 90 tablet 5      VITALS   There were no vitals taken for this visit.    MENTAL STATUS EXAM     Alertness: alert   Appearance: casually groomed  Behavior/Demeanor: cooperative, pleasant and calm, with good  eye contact   Speech: normal and regular rate and rhythm  Language: intact  Psychomotor: normal or unremarkable  Mood: \"better\"   Affect: full range; congruent to: mood- yes, content- yes  Thought Process/Associations: unremarkable  Thought Content:  Reports none;  Denies suicidal & violent ideation and delusions  Perception:  Reports none;  Denies hallucinations  Insight: fair  Judgment: fair  Cognition: does  appear grossly intact; formal cognitive testing was not done  Gait and Station: N/A ([facility])     LABS and DATA     PHQ 9/14/2022 10/25/2022 11/18/2022   PHQ-9 Total Score 15 10 9   Q9: Thoughts of better off dead/self-harm past 2 weeks Not at all Not at all Not at all       No lab results found.  No lab results found.    ECG 11/18/22 QTcH 444     PSYCHOTROPIC DRUG INTERACTIONS                                       PSYCHCLINICDDI     mirtazapine + suboxone + Adderall +sumatriptan + Wellbutrin = increased risk of sertonin syndrome  Adderall + Wellbutrin: increased exposure of Adderall d/t p450 2d6 " interaction  hydroxyzine + Suboxone + mirtazepine: risk of Qtc prolongation   gabapentin + Suboxone + sumatriptan + tizanidine: risk of CNS and respiratory depression    MANAGEMENT:  Monitoring for adverse effects     RISK STATEMENT for SAFETY     Beryl did not appear to be an imminent safety risk to self or others.    TREATMENT RISK STATEMENT: The risks, benefits, alternatives and potential adverse effects have been discussed and are understood by the pt. The pt understands the risks of using street drugs or alcohol. There are no medical contraindications, the pt agrees to treatment with the ability to do so. The pt knows to call the clinic for any problems or to access emergency care if needed.  Medical and substance use concerns are documented above.  Psychotropic drug interaction check was done, including changes made today.     PROVIDER: Shefali Johnson MD    Patient staffed in clinic with Dr. Eckert who will sign the note.  Supervisor is Dr. Owusu.

## 2022-12-01 RX ORDER — DEXTROAMPHETAMINE SACCHARATE, AMPHETAMINE ASPARTATE MONOHYDRATE, DEXTROAMPHETAMINE SULFATE AND AMPHETAMINE SULFATE 7.5; 7.5; 7.5; 7.5 MG/1; MG/1; MG/1; MG/1
30 CAPSULE, EXTENDED RELEASE ORAL DAILY
Qty: 30 CAPSULE | Refills: 0 | Status: SHIPPED | OUTPATIENT
Start: 2022-12-19 | End: 2022-12-29

## 2022-12-01 RX ORDER — DEXTROAMPHETAMINE SACCHARATE, AMPHETAMINE ASPARTATE MONOHYDRATE, DEXTROAMPHETAMINE SULFATE AND AMPHETAMINE SULFATE 2.5; 2.5; 2.5; 2.5 MG/1; MG/1; MG/1; MG/1
10 CAPSULE, EXTENDED RELEASE ORAL DAILY
Qty: 30 CAPSULE | Refills: 0 | Status: SHIPPED | OUTPATIENT
Start: 2022-12-19 | End: 2022-12-29

## 2022-12-29 ENCOUNTER — VIRTUAL VISIT (OUTPATIENT)
Dept: PSYCHIATRY | Facility: CLINIC | Age: 32
End: 2022-12-29
Attending: PSYCHIATRY & NEUROLOGY
Payer: COMMERCIAL

## 2022-12-29 DIAGNOSIS — M35.7 HYPERMOBILITY SYNDROME: ICD-10-CM

## 2022-12-29 DIAGNOSIS — F41.1 GAD (GENERALIZED ANXIETY DISORDER): ICD-10-CM

## 2022-12-29 DIAGNOSIS — F90.9 ATTENTION DEFICIT HYPERACTIVITY DISORDER (ADHD), UNSPECIFIED ADHD TYPE: ICD-10-CM

## 2022-12-29 PROCEDURE — 99443 PR PHYSICIAN TELEPHONE EVALUATION 21-30 MIN: CPT | Mod: 93 | Performed by: STUDENT IN AN ORGANIZED HEALTH CARE EDUCATION/TRAINING PROGRAM

## 2022-12-29 RX ORDER — HYDROXYZINE PAMOATE 25 MG/1
25 CAPSULE ORAL DAILY PRN
Qty: 30 CAPSULE | Refills: 0 | Status: SHIPPED | OUTPATIENT
Start: 2022-12-29 | End: 2023-01-31

## 2022-12-29 RX ORDER — GUANFACINE 2 MG/1
4 TABLET ORAL AT BEDTIME
Qty: 60 TABLET | Refills: 1 | Status: SHIPPED | OUTPATIENT
Start: 2022-12-29 | End: 2023-02-03

## 2022-12-29 RX ORDER — HYDROXYZINE PAMOATE 50 MG/1
100 CAPSULE ORAL AT BEDTIME
Qty: 60 CAPSULE | Refills: 1 | Status: SHIPPED | OUTPATIENT
Start: 2022-12-29 | End: 2023-02-03

## 2022-12-29 RX ORDER — BUPROPION HYDROCHLORIDE 300 MG/1
300 TABLET ORAL EVERY MORNING
Qty: 30 TABLET | Refills: 0 | Status: SHIPPED | OUTPATIENT
Start: 2022-12-29 | End: 2023-02-03

## 2022-12-29 RX ORDER — MIRTAZAPINE 45 MG/1
45 TABLET, FILM COATED ORAL AT BEDTIME
Qty: 30 TABLET | Refills: 1 | Status: SHIPPED | OUTPATIENT
Start: 2022-12-29 | End: 2023-02-03

## 2022-12-29 RX ORDER — GABAPENTIN 300 MG/1
300 CAPSULE ORAL 3 TIMES DAILY PRN
Qty: 90 CAPSULE | Refills: 1 | Status: SHIPPED | OUTPATIENT
Start: 2022-12-29 | End: 2023-02-03

## 2022-12-29 ASSESSMENT — PATIENT HEALTH QUESTIONNAIRE - PHQ9: SUM OF ALL RESPONSES TO PHQ QUESTIONS 1-9: 15

## 2022-12-29 ASSESSMENT — PAIN SCALES - GENERAL: PAINLEVEL: EXTREME PAIN (8)

## 2022-12-29 NOTE — PROGRESS NOTES
Beryl Valenzuela is a 32 year old who is being evaluated via a billable video visit.      Pt will join video visit via: [employer]  If there are problems joining the visit, send backup video invite via: Text to preferred phone: [phone]    Reason for telehealth visit: Patient has requested telehealth visit    Originating location (patient location): Patient's home    Will anyone else be joining the visit? No    Telephone- Visit Details  Type of service:  video visit for medication management  Time of service:    Video Start Time: 2:50     Video End Time:  3:30  Reason for video visit:  Patient has requested telehealth visit  Originating Site (patient location):  [facility]   Location- Patient's home  Distant Site (provider location):  [facility]  Mode of Communication:  Video Conference via Other: telephone     Note: converted to telephone after patient had technical difficulties with video.       [facility]  Psychiatry Clinic  MEDICAL PROGRESS NOTE     CARE TEAM:  PCP- Jamison Greene    Psychotherapist- yes     Addiction med (Shawna Owens) - Dr. Arias       Beryl Valenzuela is a 32 year old who uses the name Hanane and pronouns she, her, hers.      DIAGNOSIS     PTSD  Opioid use disorder, severe, in sustained remission (on agonist therapy, suboxone)  Unspecified depressive disorder: MDD vs persistent depressive disorder vs bipolar II disorder (history of bipolar II disorder)  ADHD per history  Antisocial personality disorder per history  Borderline personality disorder per history     ASSESSMENT     Beryl is a 31yo seen today for follow-up. Primary concerns today are heightened anxiety/panic attacks and fatigue, which appear to be driven primarily by psychosocial stressors. Discussed several medication adjustments, including reducing hydroxyzine (seemingly not efficacious for anxiety and contributing to fatigue) and increasing frequency of gabapentin  dosing (as patient reports this is helpful for anxiety and is not sedating). Patient reports that she is maintaining sobriety. No acute safety concerns.     Beryl continues to be an active participant in treatment planning and has been adherent to treatment plan. No missed since appointments since I took over her care. No recent concerns for substance use/misuse. MNPMP indicates taking medications as prescribed. Beryl continued to work to improve her mental health with the ultimate goal of retaining custody of her child.    Patient has been counseled on potentially dangerous interactions between Suboxone, gabapentin, and tizanidine, which can lead to CNS and respiratory depression.    Future considerations:  -pain management referral within our system (currently seeing addiction med provider through [facility] + separate PCP for pain management that she rarely sees, would likely benefit from single provider prescribing suboxone + any other pain-related medications)  -continue to weigh pros/cons of trial on lower doses of adderall and/or wellbutrin (may be contributing to anxiety, although Beryl does not report this)    MNPMP was checked today:  Indicates taking controlled medication as prescribed.     PLAN                                                                                                                1) Meds-  - continue Adderall XR 40mg daily for ADHD  - continue bupropion 300mg XL daily for ADHD, mood  - continue mirtazapine 45mg at bedtime for mood  - continue guanfacine 4mg at bedtime for attention, anxiety, sleep (questionable benefit from this)  - DECREASE hydroxyzine  mg at bedtime for sleep, anxiety  - continue hydroxyzine 25 mg daily PRN for anxiety  - INCREASE gabapentin TO 300mg TID PRN for anxiety       - suboxone 8-2mg BID (per addiction med)  - tizanidine (per PCP) ** counseled to reduce by 1 dose/day **   - Imitrex (per PCP) has used once in last 2 weeks    2)  "Psychotherapy- None currently, recently established with an individual psychotherapist who has since left the  system     3) Next due-  Labs- PRN  EKG- PRN  Rating scales- PHQ9, GAD7    4) Referrals-  none     5) Dispo- 4 weeks    PERTINENT ITEM HISTORY                                                                                          [most recent eval 8/4/22]     Pertinent Items Include: suicide attempt [x1-2 (once at age 6?, once at age 15)], suicidal ideation, psychosis [sxs include auditory & visual hallucinations, in context of substance use], mutiple psychotropic trials , trauma hx, substance use: opiates and heroin and substance use treatment    Patient reports that she was on a stable medication regimen for 2 years (Adderall, Klonopin, Wellbutrin, mirtazapine - prescribed through [facility]). At that time she held a job and was \"functioning normally.\" However, she reports that she relapsed on opioids and was discharged from [facility]    SUBJECTIVE     Since the time of the last visit:  - \"things are ok I guess.\"  - custody issues with father of her youngest child continue.  - disappointed that therapist she recently established with is no longer working for [employer]  - remains sober  - current medications are helping with general anxiety but not panic attacks  - she is feeling physically tired. Wants to sleep \"all day every day.\" Notes that staying in bed for extended periods of time exacerbates chronic pain.  - denies depressive symptoms (states she is \"physically, not mentally tired\")  - gabapentin seems to be helpful for pain/fatigue. Was previously taking a higher dose and preferred this.   - hydroxyzine not that helpful for anxiety but is helpful for sleep    Recent Psych Symptoms:    Depression:  low energy, poor concentration /memory and overwhelmed  Anxiety:  excessive worry and nervous/overwhelmed  Elevated:  mood dysregulation  Psychosis:  none  Trauma: endorses flashbacks    Recent Substance " Use:   Tobacco, caffeine. No other use.    Pertinent Negatives: No suicidal or violent ideation, self-injury and psychosis  Adverse Effects: none     MEDICAL HISTORY and ALLERGY     ALLERGIES: Acetaminophen, Lubricants [personal lubricant], and Vicodin [hydrocodone-acetaminophen]    Patient Active Problem List   Diagnosis     CARDIOVASCULAR SCREENING; LDL GOAL LESS THAN 160     Osteoarthritis     Abnormal Pap smear of cervix     Antisocial personality     Anxiety     Depression, unspecified depression type     Bipolar disorder (H)     Hypermobility syndrome     Acute right otitis media     Migraine     Neck pain     Hx of drug abuse (H)     Positive SHANTA (antinuclear antibody)     Fibromyalgia     Insomnia     Cluster B personality disorder (H)     PTSD (post-traumatic stress disorder)     Generalized anxiety disorder with panic attacks     Opioid use disorder, severe, in early remission (H)      MEDICAL REVIEW OF SYSTEMS   Contraception- deferred Pregnant- No    A comprehensive review of systems was performed and is negative other than noted in the HPI.     MEDICATIONS     Current Outpatient Medications   Medication Sig Dispense Refill     acetaminophen (TYLENOL) 500 MG tablet Take 1 tablet (500 mg) by mouth every 6 hours as needed for pain 120 tablet 3     amphetamine-dextroamphetamine (ADDERALL XR) 10 MG 24 hr capsule Take 1 capsule (10 mg) by mouth daily In addition to 30mg capsule for a total daily dose of 40mg 30 capsule 0     amphetamine-dextroamphetamine (ADDERALL XR) 30 MG 24 hr capsule Take 1 capsule (30 mg) by mouth daily 30 capsule 0     buPROPion (WELLBUTRIN XL) 300 MG 24 hr tablet Take 1 tablet (300 mg) by mouth every morning 30 tablet 0     gabapentin (NEURONTIN) 300 MG capsule Take 1 capsule (300 mg) by mouth 2 times daily as needed for other (anxiety) 60 capsule 0     guanFACINE (TENEX) 2 MG tablet Take 2 tablets (4 mg) by mouth At Bedtime 60 tablet 1     hydrOXYzine (VISTARIL) 25 MG capsule Take 1  capsule (25 mg) by mouth daily as needed for anxiety 30 capsule 0     hydrOXYzine (VISTARIL) 50 MG capsule Take 3 capsules (150 mg) by mouth At Bedtime 90 capsule 1     ibuprofen (ADVIL/MOTRIN) 600 MG tablet TAKE 1 TABLET BY MOUTH EVERY 6 HOURS AS NEEDED FOR MODERATE PAIN 60 tablet 1     mirtazapine (REMERON) 45 MG tablet Take 1 tablet (45 mg) by mouth At Bedtime 30 tablet 1     NARCAN 4 MG/0.1ML nasal spray  (Patient not taking: Reported on 11/18/2022)       Prenatal Vit-Fe Fumarate-FA (PNV FOLIC ACID + IRON) 27-1 MG TABS Take 1 each by mouth daily 100 tablet 3     SUBOXONE 8-2 MG per film PLACE 1 FILM UNDER THE TONGUE TWICE DAILY. 8MG/2MG FILMS.       SUMAtriptan (IMITREX) 50 MG tablet Route: Take 1 tablet by mouth at onset of headache for migraine. May repeat in 2 hours if needed: max 2/day 18 tablet 5     tiZANidine (ZANAFLEX) 4 MG tablet TAKE ONE TABLET BY MOUTH THREE TIMES DAILY AS NEEDED 90 tablet 5      VITALS   There were no vitals taken for this visit.    MENTAL STATUS EXAM     Alertness: alert   Appearance: casually groomed  Behavior/Demeanor: cooperative, pleasant and calm, with good  eye contact   Speech: normal and regular rate and rhythm  Language: intact  Psychomotor: normal or unremarkable  Mood: anxious   Affect: full range; congruent to: mood- yes, content- yes  Thought Process/Associations: unremarkable  Thought Content:  Reports none;  Denies suicidal & violent ideation and delusions  Perception:  Reports none;  Denies hallucinations  Insight: fair  Judgment: fair  Cognition: does  appear grossly intact; formal cognitive testing was not done  Gait and Station: N/A ([facility])     LABS and DATA     PHQ 10/25/2022 11/18/2022 11/30/2022   PHQ-9 Total Score 10 9 11   Q9: Thoughts of better off dead/self-harm past 2 weeks Not at all Not at all Not at all       No lab results found.  No lab results found.    ECG 11/18/22 QTcH 444     PSYCHOTROPIC DRUG INTERACTIONS                                        PSYCHCLINICDDI     mirtazapine + suboxone + Adderall +sumatriptan + Wellbutrin = increased risk of sertonin syndrome  Adderall + Wellbutrin: increased exposure of Adderall d/t p450 2d6 interaction  hydroxyzine + Suboxone + mirtazepine: risk of Qtc prolongation   gabapentin + Suboxone + sumatriptan + tizanidine: risk of CNS and respiratory depression    MANAGEMENT:  Monitoring for adverse effects     RISK STATEMENT for SAFETY     Beryl did not appear to be an imminent safety risk to self or others.    TREATMENT RISK STATEMENT: The risks, benefits, alternatives and potential adverse effects have been discussed and are understood by the pt. The pt understands the risks of using street drugs or alcohol. There are no medical contraindications, the pt agrees to treatment with the ability to do so. The pt knows to call the clinic for any problems or to access emergency care if needed.  Medical and substance use concerns are documented above.  Psychotropic drug interaction check was done, including changes made today.     PROVIDER: Shefali Johnson MD    Patient staffed in clinic with Dr. Owusu who will sign the note.  Supervisor is Dr. Owusu.

## 2022-12-29 NOTE — PROGRESS NOTES
Beryl Valenzuela is a 32 year old who is being evaluated via a billable video visit.      Pt will join video visit via: [employer]  If there are problems joining the visit, send backup video invite via: Text to preferred phone: [phone]    Reason for telehealth visit: Patient has requested telehealth visit    Originating location (patient location): Patient's home    Will anyone else be joining the visit? No

## 2023-01-01 RX ORDER — DEXTROAMPHETAMINE SACCHARATE, AMPHETAMINE ASPARTATE MONOHYDRATE, DEXTROAMPHETAMINE SULFATE AND AMPHETAMINE SULFATE 7.5; 7.5; 7.5; 7.5 MG/1; MG/1; MG/1; MG/1
30 CAPSULE, EXTENDED RELEASE ORAL DAILY
Qty: 30 CAPSULE | Refills: 0 | Status: SHIPPED | OUTPATIENT
Start: 2023-01-20 | End: 2023-02-02

## 2023-01-01 RX ORDER — DEXTROAMPHETAMINE SACCHARATE, AMPHETAMINE ASPARTATE MONOHYDRATE, DEXTROAMPHETAMINE SULFATE AND AMPHETAMINE SULFATE 2.5; 2.5; 2.5; 2.5 MG/1; MG/1; MG/1; MG/1
10 CAPSULE, EXTENDED RELEASE ORAL DAILY
Qty: 30 CAPSULE | Refills: 0 | Status: SHIPPED | OUTPATIENT
Start: 2023-01-20 | End: 2023-02-02

## 2023-01-12 DIAGNOSIS — M35.7 HYPERMOBILITY SYNDROME: ICD-10-CM

## 2023-01-12 DIAGNOSIS — G43.809 OTHER MIGRAINE WITHOUT STATUS MIGRAINOSUS, NOT INTRACTABLE: ICD-10-CM

## 2023-01-12 RX ORDER — IBUPROFEN 600 MG/1
TABLET, FILM COATED ORAL
Qty: 60 TABLET | Refills: 1 | Status: SHIPPED | OUTPATIENT
Start: 2023-01-12 | End: 2023-02-19

## 2023-01-12 RX ORDER — SUMATRIPTAN 50 MG/1
TABLET, FILM COATED ORAL
Qty: 18 TABLET | Refills: 5 | Status: SHIPPED | OUTPATIENT
Start: 2023-01-12

## 2023-01-12 NOTE — TELEPHONE ENCOUNTER
See MyChart message from patient today in Chart Review.    Patient asking for increase in her Ibuprofen quantity. Says she is using the #60 tabs up pretty fast since is taking every 6 hours. Patient notes she has troubles getting rides and to the pharmacy that often so would prefer if could get a longer supply if possible, so doesn't have to go to pharmacy for refill so often.    Routing to provider to review and advise.         Shilpa Sanabria RN  [facility]

## 2023-01-19 ENCOUNTER — TELEPHONE (OUTPATIENT)
Dept: PSYCHIATRY | Facility: CLINIC | Age: 33
End: 2023-01-19
Payer: COMMERCIAL

## 2023-01-19 ENCOUNTER — MYC MEDICAL ADVICE (OUTPATIENT)
Dept: PSYCHIATRY | Facility: CLINIC | Age: 33
End: 2023-01-19
Payer: COMMERCIAL

## 2023-01-19 NOTE — CONFIDENTIAL NOTE
Called pharmacy to approve refill of Adderall 10 and 30 mg today - per provider.    Sent patient a [employer] message.

## 2023-01-19 NOTE — CONFIDENTIAL NOTE
Called patient to discuss issue obtaining Adderall refill. She expressed frustration that she has been unable to fill Rx early.    Early fill approved.    Apologized for inconvenience and discussed allowing patient to fill prescriptions several days before she runs out.     Shefali Johnson MD

## 2023-01-19 NOTE — CONFIDENTIAL NOTE
Patient sent my chart messages - she is very frustrated. She states she is out of Adderall XR 10 and 30 mg.  Patient last picked up the prescriptions 12/20/22.  Next refill is scheduled for tomorrow 1/20/22.      Sent to Dr. Johnson for review.

## 2023-01-28 DIAGNOSIS — F41.1 GAD (GENERALIZED ANXIETY DISORDER): ICD-10-CM

## 2023-01-31 RX ORDER — HYDROXYZINE PAMOATE 25 MG/1
25 CAPSULE ORAL DAILY PRN
Qty: 30 CAPSULE | Refills: 0 | Status: SHIPPED | OUTPATIENT
Start: 2023-01-31 | End: 2023-02-03

## 2023-01-31 NOTE — PROGRESS NOTES
Beryl Valenzuela is a 32 year old who is being evaluated via a billable video visit.      Pt will join video visit via: [employer]  If there are problems joining the visit, send backup video invite via: Text to preferred phone: [phone]    Reason for telehealth visit: Patient has requested telehealth visit    Originating location (patient location): Patient's home    Will anyone else be joining the visit? No    Telephone- Visit Details  Type of service:  video visit for medication management  Time of service:    Video Start Time: 2:50     Video End Time:  3:30  Reason for video visit:  Patient has requested telehealth visit  Originating Site (patient location):  [facility]   Location- Patient's home  Distant Site (provider location):  [facility]  Mode of Communication:  Video Conference via Other: telephone     Note: converted to telephone after patient had technical difficulties with video.       [facility]  Psychiatry Clinic  MEDICAL PROGRESS NOTE     CARE TEAM:  PCP- Jamison Greene    Psychotherapist- yes     Addiction med (Shawna Owens) - Dr. Arias       Beryl Valenzuela is a 32 year old who uses the name Hanane and pronouns she, her, hers.      DIAGNOSIS     PTSD  Opioid use disorder, severe, in sustained remission (on agonist therapy, suboxone)  Unspecified depressive disorder: MDD vs persistent depressive disorder vs bipolar II disorder (history of bipolar II disorder)  ADHD per history  Antisocial personality disorder per history  Borderline personality disorder per history     ASSESSMENT     Beryl is a 31yo seen today for follow-up. Low mood, energy, and motivation have improved since last visit. Anxiety and symptoms of panic remain the same and appear to be driven primarily by psychosocial stressors. No medication changes planned today. Beryl plans to establish care with a new psychiatrist (through [facility] [employer], appointment February  14th). One month of refills provided. Patient reports that she is maintaining sobriety. No acute safety concerns.     Beryl continues to be an active participant in treatment planning and has been adherent to treatment plan. No missed since appointments since I took over her care. No recent concerns for substance use/misuse. MNPMP indicates taking medications as prescribed. Beryl continued to work to improve her mental health with the ultimate goal of retaining custody of her child.    Patient has been counseled on potentially dangerous interactions between Suboxone, gabapentin, and tizanidine, which can lead to CNS and respiratory depression.    RTC if needed. Patient currently  Future considerations:  -pain management referral within our system (currently seeing addiction med provider through [facility] + separate PCP for pain management that she rarely sees, would likely benefit from single provider prescribing suboxone + any other pain-related medications)  -continue to weigh pros/cons of trial on lower doses of adderall and/or wellbutrin (may be contributing to anxiety, although Beryl does not report this)    MNPMP was checked today:  Indicates taking controlled medication as prescribed.     PLAN                                                                                                                1) Meds-   - continue Adderall XR 40mg daily for ADHD  - continue bupropion 300mg XL daily for ADHD, mood  - continue mirtazapine 45mg at bedtime for mood  - continue guanfacine 4mg at bedtime for attention, anxiety, sleep (questionable benefit from this)  - continue hydroxyzine 100 mg at bedtime for sleep, anxiety  - continue hydroxyzine 25 mg daily PRN for anxiety  - continue gabapentin 300mg TID PRN for anxiety       - suboxone 8-2mg BID (per addiction med)  - tizanidine (per PCP) ** counseled to reduce by 1 dose/day **   - Imitrex (per PCP) has used once in last 2 weeks    2) Psychotherapy- None  "currently, recently established with an individual psychotherapist who has since left the [facility] system     3) Next due-  Labs- PRN  EKG- PRN  Rating scales- PHQ9, GAD7    4) Referrals-  none     5) Dispo- RTC if needed. Patient currently has plan to establish care with a new psychiatrist. One month of refills provided.     PERTINENT ITEM HISTORY                                                                                          [most recent eval 8/4/22]     Pertinent Items Include: suicide attempt [x1-2 (once at age 6?, once at age 15)], suicidal ideation, psychosis [sxs include auditory & visual hallucinations, in context of substance use], mutiple psychotropic trials , trauma hx, substance use: opiates and heroin and substance use treatment    Patient reports that she was on a stable medication regimen for 2 years (Adderall, Klonopin, Wellbutrin, mirtazapine - prescribed through [facility]). At that time she held a job and was \"functioning normally.\" However, she reports that she relapsed on opioids and was discharged from [facility]    SUBJECTIVE     Since the time of the last visit:  - patient is moving to a new apartment.  - custody issues with father of her youngest child continue.  - stressed about details of legal paperwork.   - experiencing panic attacks every single day  - anxiety is \"off the charts\"  - issues with sleep have improved.  - no longer having difficulty getting out of bed due to low mood. No longer falling behind on self care and chores.  - maintaining sobriety.  - found a new psychiatrist through [employer]. First appointment is February 14th.    Recent Psych Symptoms:    Depression:  low energy, poor concentration /memory and overwhelmed  Anxiety:  excessive worry and nervous/overwhelmed  Elevated:  mood dysregulation  Psychosis:  none  Trauma: endorses flashbacks    Recent Substance Use:   Tobacco, caffeine. No other use.    Pertinent Negatives: No suicidal or violent ideation, " self-injury and psychosis  Adverse Effects: none     MEDICAL HISTORY and ALLERGY     ALLERGIES: Acetaminophen, Lubricants [personal lubricant], and Vicodin [hydrocodone-acetaminophen]    Patient Active Problem List   Diagnosis     CARDIOVASCULAR SCREENING; LDL GOAL LESS THAN 160     Osteoarthritis     Abnormal Pap smear of cervix     Antisocial personality     Anxiety     Depression, unspecified depression type     Bipolar disorder (H)     Hypermobility syndrome     Acute right otitis media     Migraine     Neck pain     Hx of drug abuse (H)     Positive SHANTA (antinuclear antibody)     Fibromyalgia     Insomnia     Cluster B personality disorder (H)     PTSD (post-traumatic stress disorder)     Generalized anxiety disorder with panic attacks     Opioid use disorder, severe, in early remission (H)      MEDICAL REVIEW OF SYSTEMS   Contraception- deferred Pregnant- No    A comprehensive review of systems was performed and is negative other than noted in the HPI.     MEDICATIONS     Current Outpatient Medications   Medication Sig Dispense Refill     acetaminophen (TYLENOL) 500 MG tablet Take 1 tablet (500 mg) by mouth every 6 hours as needed for pain 120 tablet 3     amphetamine-dextroamphetamine (ADDERALL XR) 10 MG 24 hr capsule Take 1 capsule (10 mg) by mouth daily In addition to 30mg capsule for a total daily dose of 40mg 30 capsule 0     amphetamine-dextroamphetamine (ADDERALL XR) 30 MG 24 hr capsule Take 1 capsule (30 mg) by mouth daily 30 capsule 0     buPROPion (WELLBUTRIN XL) 300 MG 24 hr tablet Take 1 tablet (300 mg) by mouth every morning 30 tablet 0     gabapentin (NEURONTIN) 300 MG capsule Take 1 capsule (300 mg) by mouth 3 times daily as needed for other (anxiety) 90 capsule 1     guanFACINE (TENEX) 2 MG tablet Take 2 tablets (4 mg) by mouth At Bedtime 60 tablet 1     hydrOXYzine (VISTARIL) 25 MG capsule Take 1 capsule (25 mg) by mouth daily as needed for anxiety 30 capsule 0     hydrOXYzine (VISTARIL) 50 MG  capsule Take 2 capsules (100 mg) by mouth At Bedtime 60 capsule 1     ibuprofen (ADVIL/MOTRIN) 600 MG tablet TAKE 1 TABLET BY MOUTH EVERY 6 HOURS AS NEEDED FOR MODERATE PAIN 60 tablet 1     mirtazapine (REMERON) 45 MG tablet Take 1 tablet (45 mg) by mouth At Bedtime 30 tablet 1     NARCAN 4 MG/0.1ML nasal spray  (Patient not taking: Reported on 11/18/2022)       Prenatal Vit-Fe Fumarate-FA (PNV FOLIC ACID + IRON) 27-1 MG TABS Take 1 each by mouth daily 100 tablet 3     SUBOXONE 8-2 MG per film PLACE 1 FILM UNDER THE TONGUE TWICE DAILY. 8MG/2MG FILMS.       SUMAtriptan (IMITREX) 50 MG tablet TAKE 1 TABLET BY MOUTH AT ONSET OF HEADACHE FOR MIGRAINE. MAY REPEAT IN 2 HOURS AS NEEDED:. MAX 2 PER DAY 18 tablet 5     tiZANidine (ZANAFLEX) 4 MG tablet TAKE ONE TABLET BY MOUTH THREE TIMES DAILY AS NEEDED 90 tablet 5      VITALS   There were no vitals taken for this visit.    MENTAL STATUS EXAM     Alertness: alert   Appearance: casually groomed  Behavior/Demeanor: cooperative, pleasant and calm, with good  eye contact   Speech: normal and regular rate and rhythm  Language: intact  Psychomotor: normal or unremarkable  Mood: anxious   Affect: full range; congruent to: mood- yes, content- yes  Thought Process/Associations: unremarkable  Thought Content:  Reports none;  Denies suicidal & violent ideation and delusions  Perception:  Reports none;  Denies hallucinations  Insight: fair  Judgment: fair  Cognition: does  appear grossly intact; formal cognitive testing was not done  Gait and Station: N/A ([facility])     LABS and DATA     PHQ 11/18/2022 11/30/2022 12/29/2022   PHQ-9 Total Score 9 11 15   Q9: Thoughts of better off dead/self-harm past 2 weeks Not at all Not at all Not at all       No lab results found.  No lab results found.    ECG 11/18/22 QTcH 444     PSYCHOTROPIC DRUG INTERACTIONS                                       PSYCHCLINICDDI     mirtazapine + suboxone + Adderall +sumatriptan + Wellbutrin = increased risk of  sertonin syndrome  Adderall + Wellbutrin: increased exposure of Adderall d/t p450 2d6 interaction  hydroxyzine + Suboxone + mirtazepine: risk of Qtc prolongation   gabapentin + Suboxone + sumatriptan + tizanidine: risk of CNS and respiratory depression    MANAGEMENT:  Monitoring for adverse effects     RISK STATEMENT for SAFETY     Beryl did not appear to be an imminent safety risk to self or others.    TREATMENT RISK STATEMENT: The risks, benefits, alternatives and potential adverse effects have been discussed and are understood by the pt. The pt understands the risks of using street drugs or alcohol. There are no medical contraindications, the pt agrees to treatment with the ability to do so. The pt knows to call the clinic for any problems or to access emergency care if needed.  Medical and substance use concerns are documented above.  Psychotropic drug interaction check was done, including changes made today.     PROVIDER: Shefali Johnson MD    Patient staffed in clinic with Dr. Flor who will sign the note.  Supervisor is Dr. Owusu.

## 2023-01-31 NOTE — TELEPHONE ENCOUNTER
hydrOXYzine (VISTARIL) 25 MG   Last refilled: 12/29/22  Qty: 30    Last seen: 12/29/22  RTC: 1 MOS  Cancel: 0  No-show: 0  Next appt: 2/1/23

## 2023-02-01 ENCOUNTER — VIRTUAL VISIT (OUTPATIENT)
Dept: PSYCHIATRY | Facility: CLINIC | Age: 33
End: 2023-02-01
Attending: PSYCHIATRY & NEUROLOGY
Payer: COMMERCIAL

## 2023-02-01 DIAGNOSIS — F90.9 ATTENTION DEFICIT HYPERACTIVITY DISORDER (ADHD), UNSPECIFIED ADHD TYPE: ICD-10-CM

## 2023-02-01 DIAGNOSIS — F41.1 GAD (GENERALIZED ANXIETY DISORDER): ICD-10-CM

## 2023-02-01 DIAGNOSIS — M35.7 HYPERMOBILITY SYNDROME: ICD-10-CM

## 2023-02-01 PROCEDURE — 99214 OFFICE O/P EST MOD 30 MIN: CPT | Mod: GC | Performed by: STUDENT IN AN ORGANIZED HEALTH CARE EDUCATION/TRAINING PROGRAM

## 2023-02-01 PROCEDURE — G0463 HOSPITAL OUTPT CLINIC VISIT: HCPCS | Mod: PN

## 2023-02-01 ASSESSMENT — ANXIETY QUESTIONNAIRES
2. NOT BEING ABLE TO STOP OR CONTROL WORRYING: NEARLY EVERY DAY
1. FEELING NERVOUS, ANXIOUS, OR ON EDGE: NEARLY EVERY DAY
7. FEELING AFRAID AS IF SOMETHING AWFUL MIGHT HAPPEN: NEARLY EVERY DAY
5. BEING SO RESTLESS THAT IT IS HARD TO SIT STILL: NEARLY EVERY DAY
8. IF YOU CHECKED OFF ANY PROBLEMS, HOW DIFFICULT HAVE THESE MADE IT FOR YOU TO DO YOUR WORK, TAKE CARE OF THINGS AT HOME, OR GET ALONG WITH OTHER PEOPLE?: EXTREMELY DIFFICULT
IF YOU CHECKED OFF ANY PROBLEMS ON THIS QUESTIONNAIRE, HOW DIFFICULT HAVE THESE PROBLEMS MADE IT FOR YOU TO DO YOUR WORK, TAKE CARE OF THINGS AT HOME, OR GET ALONG WITH OTHER PEOPLE: EXTREMELY DIFFICULT
6. BECOMING EASILY ANNOYED OR IRRITABLE: NEARLY EVERY DAY
GAD7 TOTAL SCORE: 21
3. WORRYING TOO MUCH ABOUT DIFFERENT THINGS: NEARLY EVERY DAY
4. TROUBLE RELAXING: NEARLY EVERY DAY
7. FEELING AFRAID AS IF SOMETHING AWFUL MIGHT HAPPEN: NEARLY EVERY DAY

## 2023-02-01 ASSESSMENT — PATIENT HEALTH QUESTIONNAIRE - PHQ9
10. IF YOU CHECKED OFF ANY PROBLEMS, HOW DIFFICULT HAVE THESE PROBLEMS MADE IT FOR YOU TO DO YOUR WORK, TAKE CARE OF THINGS AT HOME, OR GET ALONG WITH OTHER PEOPLE: VERY DIFFICULT
SUM OF ALL RESPONSES TO PHQ QUESTIONS 1-9: 18
SUM OF ALL RESPONSES TO PHQ QUESTIONS 1-9: 18

## 2023-02-01 NOTE — PATIENT INSTRUCTIONS
ADD.org for ADHD support group.     **For crisis resources, please see the information at the end of this document**   Patient Education    Thank you for coming to the [facility] [place] [facility].     Lab Testing:  If you had lab testing today and your results are reassuring or normal they will be mailed to you or sent through [employer] within 7 days. If the lab tests need quick action we will call you with the results. The phone number we will call with results is # [phone]. If this is not the best number please call our clinic and change the number.     Medication Refills:  If you need any refills please call your pharmacy and they will contact us. Our fax number for refills is [phone].   Three business days of notice are needed for general medication refill requests.   Five business days of notice are needed for controlled substance refill requests.   If you need to change to a different pharmacy, please contact the new pharmacy directly. The new pharmacy will help you get your medications transferred.     Contact Us:  Please call [phone] during business hours (8-5:00 M-F).   If you have medication related questions after clinic hours, or on the weekend, please call [phone].     Financial Assistance [phone]   Medical Records [phone]       MENTAL HEALTH CRISIS RESOURCES:  For a emergency help, please call 911 or go to the nearest Emergency Department.     Emergency Walk-In Options:   EmPATH Unit @ [facility] ([place]): [phone] - Specialized mental health emergency area designed to be calming  [facility] ([place]): [phone]  [facility] ([place]): [phone]  [facility]): [phone]    [facility] Crisis Information:   [place]: [phone]  [place]: [phone]  [place] (REJI) - Adult: [phone]     Child: [phone]  [place] - Adult: [phone]     Child:  [phone]  [place]: [phone]  List of all [facility] resources:   https://[place].gov/dhs/people-we-serve/adults/health-care/mental-health/resources/crisis-contacts.jsp    National Crisis Information:   Crisis Text Line: Text  [place]  to 247552  Suicide & Crisis Lifeline: 988  National Suicide Prevention Lifeline: 1-572-200-TALK (1-376.412.6262)       For online chat options, visit https://suicidepreventionlifeline.org/chat/  Poison Control Center: 9-993-600-1819  Trans Lifeline: 4-668-161-4767 - Hotline for transgender people of all ages  The [employer] Project: 7-335-241-2949 - Hotline for LGBT youth     For Non-Emergency Support:   Fast Tracker: Mental Health & Substance Use Disorder Resources -   https://www.[employer].org/

## 2023-02-02 ENCOUNTER — MYC MEDICAL ADVICE (OUTPATIENT)
Dept: FAMILY MEDICINE | Facility: CLINIC | Age: 33
End: 2023-02-02
Payer: COMMERCIAL

## 2023-02-02 RX ORDER — BUPROPION HYDROCHLORIDE 300 MG/1
300 TABLET ORAL EVERY MORNING
Qty: 30 TABLET | Refills: 0 | Status: CANCELLED | OUTPATIENT
Start: 2023-02-02

## 2023-02-02 RX ORDER — DEXTROAMPHETAMINE SACCHARATE, AMPHETAMINE ASPARTATE MONOHYDRATE, DEXTROAMPHETAMINE SULFATE AND AMPHETAMINE SULFATE 7.5; 7.5; 7.5; 7.5 MG/1; MG/1; MG/1; MG/1
30 CAPSULE, EXTENDED RELEASE ORAL DAILY
Qty: 30 CAPSULE | Refills: 0 | Status: SHIPPED | OUTPATIENT
Start: 2023-02-20 | End: 2023-04-17

## 2023-02-02 RX ORDER — DEXTROAMPHETAMINE SACCHARATE, AMPHETAMINE ASPARTATE MONOHYDRATE, DEXTROAMPHETAMINE SULFATE AND AMPHETAMINE SULFATE 2.5; 2.5; 2.5; 2.5 MG/1; MG/1; MG/1; MG/1
10 CAPSULE, EXTENDED RELEASE ORAL DAILY
Qty: 30 CAPSULE | Refills: 0 | Status: SHIPPED | OUTPATIENT
Start: 2023-02-20 | End: 2023-03-24

## 2023-02-03 RX ORDER — GABAPENTIN 300 MG/1
300 CAPSULE ORAL 3 TIMES DAILY PRN
Qty: 90 CAPSULE | Refills: 0 | Status: SHIPPED | OUTPATIENT
Start: 2023-02-03 | End: 2023-03-23

## 2023-02-03 RX ORDER — MIRTAZAPINE 45 MG/1
45 TABLET, FILM COATED ORAL AT BEDTIME
Qty: 30 TABLET | Refills: 1 | Status: SHIPPED | OUTPATIENT
Start: 2023-02-03 | End: 2023-05-12

## 2023-02-03 RX ORDER — HYDROXYZINE PAMOATE 50 MG/1
100 CAPSULE ORAL AT BEDTIME
Qty: 60 CAPSULE | Refills: 0 | Status: SHIPPED | OUTPATIENT
Start: 2023-02-03 | End: 2023-05-12

## 2023-02-03 RX ORDER — BUPROPION HYDROCHLORIDE 300 MG/1
300 TABLET ORAL EVERY MORNING
Qty: 30 TABLET | Refills: 0 | Status: SHIPPED | OUTPATIENT
Start: 2023-02-03 | End: 2023-03-23

## 2023-02-03 RX ORDER — HYDROXYZINE PAMOATE 25 MG/1
25 CAPSULE ORAL DAILY PRN
Qty: 30 CAPSULE | Refills: 0 | Status: SHIPPED | OUTPATIENT
Start: 2023-02-03 | End: 2023-03-23

## 2023-02-03 RX ORDER — GUANFACINE 2 MG/1
4 TABLET ORAL AT BEDTIME
Qty: 60 TABLET | Refills: 0 | Status: SHIPPED | OUTPATIENT
Start: 2023-02-03 | End: 2023-04-06

## 2023-02-17 DIAGNOSIS — M35.7 HYPERMOBILITY SYNDROME: ICD-10-CM

## 2023-02-17 RX ORDER — PSEUDOEPHED/ACETAMINOPH/DIPHEN 30MG-500MG
TABLET ORAL
Qty: 120 TABLET | Refills: 3 | Status: SHIPPED | OUTPATIENT
Start: 2023-02-17 | End: 2023-07-13

## 2023-02-19 RX ORDER — IBUPROFEN 600 MG/1
600 TABLET, FILM COATED ORAL EVERY 6 HOURS PRN
Qty: 60 TABLET | Refills: 1 | Status: SHIPPED | OUTPATIENT
Start: 2023-02-19 | End: 2023-05-11

## 2023-03-03 ENCOUNTER — OFFICE VISIT (OUTPATIENT)
Dept: URGENT CARE | Facility: URGENT CARE | Age: 33
End: 2023-03-03
Payer: COMMERCIAL

## 2023-03-03 VITALS
BODY MASS INDEX: 29.74 KG/M2 | SYSTOLIC BLOOD PRESSURE: 119 MMHG | WEIGHT: 160 LBS | TEMPERATURE: 97.3 F | DIASTOLIC BLOOD PRESSURE: 96 MMHG | RESPIRATION RATE: 24 BRPM | OXYGEN SATURATION: 100 % | HEART RATE: 69 BPM
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
## 2023-03-03 PROCEDURE — 99213 OFFICE O/P EST LOW 20 MIN: CPT | Performed by: PHYSICIAN ASSISTANT

## 2023-03-03 RX ORDER — MIRTAZAPINE 15 MG/1
45 TABLET, FILM COATED ORAL AT BEDTIME
COMMUNITY
Start: 2023-01-24 | End: 2023-08-22

## 2023-03-03 RX ORDER — LAMOTRIGINE 100 MG/1
1 TABLET ORAL DAILY
COMMUNITY
End: 2023-08-22

## 2023-03-03 RX ORDER — BENZOCAINE/MENTHOL 6 MG-10 MG
LOZENGE MUCOUS MEMBRANE 2 TIMES DAILY
Qty: 30 G | Refills: 0 | Status: SHIPPED | OUTPATIENT
Start: 2023-03-03

## 2023-03-04 NOTE — PROGRESS NOTES
"SUBJECTIVE:   Beryl Valenzuela is a 32 year old female presenting with a chief complaint of   Chief Complaint   Patient presents with     Insect Bites     Sx Suspected flea bites bites all over her body. Feels things moving on her body and bites.       She is an established patient of [facility].  Patient was at ED yesterday and diagnosed with scabies.  She presents with rash about 10 days ago.  Itchy.        Review of Systems    Past Medical History:   Diagnosis Date     Antisocial personality     with borderline traits     Anxiety      Bipolar disorder (H)      Migraine      Mood disorder (H)      PTSD (post-traumatic stress disorder)     severe     Family History   Problem Relation Age of Onset     Arthritis Mother      Depression Mother      Hypertension Mother      Depression Father      Cancer Maternal Grandmother         brain cancer     Arthritis Maternal Grandmother      Diabetes Maternal Grandfather      Arthritis Maternal Grandfather      Musculoskeletal Disorder Maternal Grandfather         \"muscle dz\"     Hypertension Paternal Grandmother      Hypertension Paternal Grandfather      Asthma Sister      C.A.D. No family hx of      Breast Cancer No family hx of      Cancer - colorectal No family hx of      Prostate Cancer No family hx of      Heart Disease No family hx of      Lipids No family hx of      Thyroid Disease No family hx of      Cerebrovascular Disease Other      Current Outpatient Medications   Medication Sig Dispense Refill     ACETAMINOPHEN EXTRA STRENGTH 500 MG tablet TAKE 1 TABLET(500 MG) BY MOUTH EVERY 6 HOURS AS NEEDED FOR PAIN 120 tablet 3     amphetamine-dextroamphetamine (ADDERALL XR) 10 MG 24 hr capsule Take 1 capsule (10 mg) by mouth daily In addition to 30mg capsule for a total daily dose of 40mg 30 capsule 0     amphetamine-dextroamphetamine (ADDERALL XR) 30 MG 24 hr capsule Take 1 capsule (30 mg) by mouth daily 30 capsule 0     buPROPion (WELLBUTRIN XL) 300 MG 24 hr tablet Take 1 " tablet (300 mg) by mouth every morning 30 tablet 0     gabapentin (NEURONTIN) 300 MG capsule Take 1 capsule (300 mg) by mouth 3 times daily as needed for other (anxiety) 90 capsule 0     guanFACINE (TENEX) 2 MG tablet Take 2 tablets (4 mg) by mouth At Bedtime 60 tablet 0     hydrocortisone (CORTAID) 1 % external cream Apply topically 2 times daily 30 g 0     hydrOXYzine (VISTARIL) 25 MG capsule Take 1 capsule (25 mg) by mouth daily as needed for anxiety 30 capsule 0     hydrOXYzine (VISTARIL) 50 MG capsule Take 2 capsules (100 mg) by mouth At Bedtime 60 capsule 0     ibuprofen (ADVIL/MOTRIN) 600 MG tablet Take 1 tablet (600 mg) by mouth every 6 hours as needed for moderate pain (4-6) (take with food and drink lots of water) 60 tablet 1     mirtazapine (REMERON) 45 MG tablet Take 1 tablet (45 mg) by mouth At Bedtime 30 tablet 1     Prenatal Vit-Fe Fumarate-FA (PNV FOLIC ACID + IRON) 27-1 MG TABS Take 1 each by mouth daily 100 tablet 3     SUBOXONE 8-2 MG per film PLACE 1 FILM UNDER THE TONGUE TWICE DAILY. 8MG/2MG FILMS.       SUMAtriptan (IMITREX) 50 MG tablet TAKE 1 TABLET BY MOUTH AT ONSET OF HEADACHE FOR MIGRAINE. MAY REPEAT IN 2 HOURS AS NEEDED:. MAX 2 PER DAY 18 tablet 5     tiZANidine (ZANAFLEX) 4 MG tablet TAKE ONE TABLET BY MOUTH THREE TIMES DAILY AS NEEDED 90 tablet 5     lamoTRIgine (LAMICTAL) 100 MG tablet Take 1 tablet by mouth daily (Patient not taking: Reported on 3/3/2023)       mirtazapine (REMERON) 15 MG tablet Take 45 mg by mouth At Bedtime (Patient not taking: Reported on 3/3/2023)       NARCAN 4 MG/0.1ML nasal spray  (Patient not taking: Reported on 11/18/2022)       Social History     Tobacco Use     Smoking status: Every Day     Packs/day: 0.50     Types: Cigarettes     Smokeless tobacco: Never   Substance Use Topics     Alcohol use: No       OBJECTIVE  BP (!) 119/96   Pulse 69   Temp 97.3  F (36.3  C) (Tympanic)   Resp 24   Wt 72.6 kg (160 lb)   SpO2 100%   BMI 29.74 kg/m      Physical  Exam  Vitals and nursing note reviewed.   Constitutional:       Appearance: Normal appearance. She is normal weight.   Eyes:      Extraocular Movements: Extraocular movements intact.      Conjunctiva/sclera: Conjunctivae normal.   Cardiovascular:      Rate and Rhythm: Normal rate.   Skin:     Comments: Diffuse papules - minimal - on forearms, hands, lower extremities.   Neurological:      Mental Status: She is alert.         Labs:  No results found for this or any previous visit (from the past 24 hour(s)).    X-Ray was not done.    ASSESSMENT:      ICD-10-CM    1. Bug bite, initial encounter  W57.XXXA hydrocortisone (CORTAID) 1 % external cream           Medical Decision Making:    Differential Diagnosis:  Bug bites, fleas    Serious Comorbid Conditions:  Adult:  reviewed    PLAN:    Rx for hydrocortisone cream.      Followup:    If not improving or if condition worsens, follow up with your Primary Care Provider, If not improving or if conditions worsens over the next 12-24 hours, go to the Emergency Department    There are no Patient Instructions on file for this visit.

## 2023-03-18 DIAGNOSIS — F90.9 ATTENTION DEFICIT HYPERACTIVITY DISORDER (ADHD), UNSPECIFIED ADHD TYPE: ICD-10-CM

## 2023-03-18 DIAGNOSIS — M35.7 HYPERMOBILITY SYNDROME: ICD-10-CM

## 2023-03-18 DIAGNOSIS — F41.1 GAD (GENERALIZED ANXIETY DISORDER): ICD-10-CM

## 2023-03-20 DIAGNOSIS — F41.1 GAD (GENERALIZED ANXIETY DISORDER): ICD-10-CM

## 2023-03-20 DIAGNOSIS — M35.7 HYPERMOBILITY SYNDROME: ICD-10-CM

## 2023-03-20 RX ORDER — GABAPENTIN 300 MG/1
300 CAPSULE ORAL 3 TIMES DAILY PRN
Qty: 90 CAPSULE | Refills: 0 | Status: CANCELLED | OUTPATIENT
Start: 2023-03-20

## 2023-03-20 NOTE — TELEPHONE ENCOUNTER
[place]  gabapentin (NEURONTIN) 300 MG capsule 2/27 #90, 1/31 #90, 1/3 #90    Writer routed to provider for approval.

## 2023-03-20 NOTE — TELEPHONE ENCOUNTER
"Last Seen 2/1/23  RTC \"if needed\"  Cancel 1  No-Show 0    Next Appt none    Incoming Refill From [facility] via fax    Medication Requested   gabapentin (NEURONTIN) 300 MG capsule    Directions   Route: Take 1 capsule (300 mg) by mouth 3 times daily as needed for other (anxiety) - Oral    Qty 90    Last Refill 2/3/23    Medication Refill Pended Per Refill Protocol  "

## 2023-03-21 NOTE — TELEPHONE ENCOUNTER
buPROPion (WELLBUTRIN XL) 300 MG    Last refilled: 2/3/23  Qty: 30    Last seen: 2/1/23  RTC:   Transfer ?  Cancel: 0  No-show: 0  Next appt: none  Refill pended and routed to the provider for review/determination due to :  Last note : RTC if needed. Patient currently has plan to establish care with a new psychiatrist.

## 2023-03-22 NOTE — TELEPHONE ENCOUNTER
See 3/18 refill encounter for further information regarding gabapentin refill and general update on the client.    Vivi Connell RN on 3/22/2023 at 10:24 AM

## 2023-03-22 NOTE — TELEPHONE ENCOUNTER
"Last seen: 2/1/23  RTC: \"if needed\"  Cancel: 3/6/23  No-show: none  Next appt: none     Medication requested: amphetamine-dextroamphetamine (ADDERALL XR) 10 MG 24 hr capsule  Directions: Take 1 capsule (10 mg) by mouth daily In addition to 30mg capsule for a total daily dose of 40mg  Qty: 30  Last refilled: 2/17/23, 1/19/23, and 12/20/22 all for 30 d/s per [place]   -Client able to fill Adderall XR 30 mg on 3/18/23. Called pharmacy. They filled old Rx written on 12/1/22 by Dr. Eckert. They did not have Rx for XR 10 mg caps and so this was not filled at that time.    Medication requested: buPROPion (WELLBUTRIN XL) 300 MG 24 hr tablet  Directions: Take 1 tablet (300 mg) by mouth every morning  Qty: 30  Last refilled: 2/25/23 per outside med rec    Medication requested: gabapentin (NEURONTIN) 300 MG capsule  Directions: Take 1 capsule (300 mg) by mouth 3 times daily as needed for other (anxiety  Qty: 90  Last refilled: 2/27/23, 1/31/23, and 1/3/23 per [place]     Medication requested: guanFACINE (TENEX) 2 MG tablet  Directions: Take 2 tablets (4 mg) by mouth At Bedtime  Qty: 60  Last refilled: 3/18/23 per outside med rec    Medication requested: hydrOXYzine (VISTARIL) 25 MG capsule  Directions: Take 1 capsule (25 mg) by mouth daily as needed for anxiety  Qty: 30  Last refilled: 2/24/23 per outside med rec    Medication requested: hydrOXYzine (VISTARIL) 50 MG capsule  Directions: Take 2 capsules (100 mg) by mouth At Bedtime  Qty: 60  Last refilled: 3/18/23 per outside med rec     Medication requested: mirtazapine (REMERON) 45 MG tablet  Directions: Take 1 tablet (45 mg) by mouth At Bedtime  Qty: 30  Last refilled: 3/18/23 per outside med rec    Because certain meds were just filled on 3/18, will only pend prescriptions for Adderall XR 10 mg, Wellbutrin, gabapentin, and hydroxyzine 25 mg. Will ask Dr. Johnson how she wants to proceed with the other medications.    Vivi Connell RN on 3/22/2023 at 10:21 AM      "

## 2023-03-22 NOTE — TELEPHONE ENCOUNTER
"-Writer reviewed the current request further. There is also a pending refill request for gabapentin in the 3/20 encounter.    -Reviewed Dr. Johnson's note and gathered the following information:    \"Beryl plans to establish care with a new psychiatrist (through [facility], appointment February 14th). One month of refills provided. RTC if needed.\"    -Placed a call to Beryl to check in. She informed this writer that she did not attend the appointment on 2/14 with [facility]. Said she has been feeling overwhelmed by environmental stressors over the last month. Reports she just moved to a new apartment approximately one month ago, but now the apartment is infested with fleas. Her kids are not currently staying with her due to this, and she's in the process of moving out of this apartment now. Client reports she's currently in the process of packing her belongings. Discussed scheduling a follow up with Dr. Johnson. Client asks to push this out, as she's feeling too overwhelmed to schedule a visit at this time. Confirmed she needs refills of all her psychiatric medications. Writer agreed to pend a 30 d/s of all meds for Dr. Johnson to review. Will then call the patient back to confirm if Dr. Johnson approved of these. Explained that she should call the clinic when she's running low on her meds and is in need of another refill to schedule a f/up appt, as Dr. Johnson cannot continue to prescribe medications without Beryl being seen. She voiced understanding.    Vivi Connell RN on 3/22/2023 at 10:07 AM    "

## 2023-03-23 RX ORDER — HYDROXYZINE PAMOATE 25 MG/1
25 CAPSULE ORAL DAILY PRN
Qty: 30 CAPSULE | Refills: 0 | Status: SHIPPED | OUTPATIENT
Start: 2023-03-23 | End: 2023-04-28

## 2023-03-23 RX ORDER — GABAPENTIN 300 MG/1
300 CAPSULE ORAL 3 TIMES DAILY PRN
Qty: 90 CAPSULE | Refills: 0 | Status: SHIPPED | OUTPATIENT
Start: 2023-03-23 | End: 2023-05-12

## 2023-03-23 RX ORDER — BUPROPION HYDROCHLORIDE 300 MG/1
300 TABLET ORAL EVERY MORNING
Qty: 30 TABLET | Refills: 0 | Status: SHIPPED | OUTPATIENT
Start: 2023-03-23 | End: 2023-05-12

## 2023-03-24 RX ORDER — DEXTROAMPHETAMINE SACCHARATE, AMPHETAMINE ASPARTATE MONOHYDRATE, DEXTROAMPHETAMINE SULFATE AND AMPHETAMINE SULFATE 2.5; 2.5; 2.5; 2.5 MG/1; MG/1; MG/1; MG/1
10 CAPSULE, EXTENDED RELEASE ORAL DAILY
Qty: 30 CAPSULE | Refills: 0 | Status: SHIPPED | OUTPATIENT
Start: 2023-03-24 | End: 2023-04-17

## 2023-03-29 DIAGNOSIS — M35.7 HYPERMOBILITY SYNDROME: ICD-10-CM

## 2023-03-29 DIAGNOSIS — F41.1 GAD (GENERALIZED ANXIETY DISORDER): ICD-10-CM

## 2023-03-29 RX ORDER — GABAPENTIN 300 MG/1
CAPSULE ORAL
Qty: 90 CAPSULE | Refills: 0 | OUTPATIENT
Start: 2023-03-29

## 2023-03-30 ENCOUNTER — OFFICE VISIT (OUTPATIENT)
Dept: URGENT CARE | Facility: URGENT CARE | Age: 33
End: 2023-03-30
Payer: COMMERCIAL

## 2023-03-30 ENCOUNTER — LAB (OUTPATIENT)
Dept: LAB | Facility: CLINIC | Age: 33
End: 2023-03-30
Payer: COMMERCIAL

## 2023-03-30 VITALS
SYSTOLIC BLOOD PRESSURE: 137 MMHG | WEIGHT: 149.4 LBS | RESPIRATION RATE: 20 BRPM | DIASTOLIC BLOOD PRESSURE: 91 MMHG | TEMPERATURE: 98.2 F | OXYGEN SATURATION: 100 % | BODY MASS INDEX: 27.77 KG/M2 | HEART RATE: 79 BPM

## 2023-03-30 DIAGNOSIS — F11.20 OPIOID DEPENDENCE ON AGONIST THERAPY (H): ICD-10-CM

## 2023-03-30 DIAGNOSIS — F11.20 OPIOID DEPENDENCE ON AGONIST THERAPY (H): Primary | ICD-10-CM

## 2023-03-30 DIAGNOSIS — R21 RASH: Primary | ICD-10-CM

## 2023-03-30 PROCEDURE — 99000 SPECIMEN HANDLING OFFICE-LAB: CPT

## 2023-03-30 PROCEDURE — 99213 OFFICE O/P EST LOW 20 MIN: CPT | Performed by: FAMILY MEDICINE

## 2023-03-30 RX ORDER — PERMETHRIN 50 MG/G
CREAM TOPICAL
Qty: 60 G | Refills: 1 | Status: SHIPPED | OUTPATIENT
Start: 2023-03-30

## 2023-03-30 NOTE — PROGRESS NOTES
Assessment & Plan     Rash  Unclear etiology. It looks more like dishydrotic eczema of the hand. Dicussed the possibility of tinea manus but she does not think that is possible and does not want to pursue a KOH to look for fungus. She reports some improvement when given permethrin about 3 weeks ago. Possibly this could be a atypical look for scabies. She is concerned of fleas and bed bugs but both seem very unlikely to be causing the rash. In the end we decided on permetrin and repeat in a week with follow up with derm for their   - permethrin (ELIMITE) 5 % external cream  Dispense: 60 g; Refill: 1  - Adult Dermatology Referral       [id number]}    Return in about 2 weeks (around 4/13/2023) for dermatology if not helpful. .    David Lizama MD  [facility]    ------------------------------------------------------------------------  Subjective     Beryl Valenzuela presents to clinic today for the following health issues:  chief complaint  HPI    She reports rash on her hands and other parts of her body including her feet but mostly on her hands for the past few weeks. About 3 weeks ago was treated with permetrhin which may have helped but then regressed and seen again and given steroid but not better. It is itchy but not horribly. She is concerned about bugs as she has seen clear colored bugs in her home along with fleas. This is a new home for her and she had a  Cat but got rid of the cat. She can palpate harder spots where she suspects bugs under her skin along with seeing them at times.     Patient Active Problem List   Diagnosis     CARDIOVASCULAR SCREENING; LDL GOAL LESS THAN 160     Osteoarthritis     Abnormal Pap smear of cervix     Antisocial personality     Anxiety     Depression, unspecified depression type     Bipolar disorder (H)     Hypermobility syndrome     Acute right otitis media     Migraine     Neck pain     Hx of drug abuse (H)     Positive SHANTA (antinuclear antibody)     Fibromyalgia      Insomnia     Cluster B personality disorder (H)     PTSD (post-traumatic stress disorder)     Generalized anxiety disorder with panic attacks     Opioid use disorder, severe, in early remission (H)        She has not traveled and she reports no known exposures to others with similar rash except her daughter did have a rash recently treated with steroid. The provider who saw her thought this was a eczema flare, her daughter is known to have eczema.       Review of Systems  No fever.       Objective    BP (!) 137/91   Pulse 79   Temp 98.2  F (36.8  C) (Tympanic)   Resp 20   Wt 67.8 kg (149 lb 6.4 oz)   SpO2 100%   BMI 27.77 kg/m    Physical Exam   GENERAL: healthy, alert and no distress but appears quite stressed and fixated on the possiiblity that these are bugs.  SKIN: xerotic cracked skin with scale over much of her hands. Less noted in the webbing. Minimal lesions noted on her arms. Her feet have much less dryness and minimal scaliness. She points to areas where she feels harder bumps but I was unable to appreciate much induration or lesion in these areas.

## 2023-04-04 ENCOUNTER — OFFICE VISIT (OUTPATIENT)
Dept: DERMATOLOGY | Facility: CLINIC | Age: 33
End: 2023-04-04
Payer: COMMERCIAL

## 2023-04-04 DIAGNOSIS — R21 RASH: ICD-10-CM

## 2023-04-04 DIAGNOSIS — L30.9 DERMATITIS: Primary | ICD-10-CM

## 2023-04-04 PROCEDURE — 99243 OFF/OP CNSLTJ NEW/EST LOW 30: CPT | Performed by: DERMATOLOGY

## 2023-04-04 RX ORDER — CLOBETASOL PROPIONATE 0.5 MG/G
CREAM TOPICAL
Qty: 120 G | Refills: 3 | Status: SHIPPED | OUTPATIENT
Start: 2023-04-04

## 2023-04-04 ASSESSMENT — PAIN SCALES - GENERAL: PAINLEVEL: NO PAIN (0)

## 2023-04-04 NOTE — PATIENT INSTRUCTIONS
Proper skin care from Dr. Ferrer- [facility]     Eliminate harsh soaps, i.e. Dial, Zest, Raven Spring;   Use mild soaps such as Cetaphil or Dove Sensitive Skin   Avoid overly hot or cold showers   After showering, lightly pat dry off.   Apply moisturizer immediately to damp dry skin.   Aggressive use of a moisturizer (including Vanicream, Cetaphil, Aquaphor, Eucerin, or Cerave)   We recommend using tub-style moisturizer that needs to be scooped out by hand, not a pump. This has more of an oil base. It will hold moisture in your skin much better than a water base moisturizer. The ones recommended are non- pore clogging.       If you have any questions call [phone] and follow the prompts to Dr. Ferrer's office.

## 2023-04-04 NOTE — PROGRESS NOTES
"Beryl Valenzuela , a 32 year old year old female patient, I was asked to see by Dr. Lizama for rash on hands and feet.  Has had 3 rounds of scabies treatment.  She cleans a lot.  She is not working.  Patient has no other skin complaints today.  Remainder of the HPI, Meds, PMH, Allergies, FH, and SH was reviewed in chart.      Past Medical History:   Diagnosis Date     Antisocial personality     with borderline traits     Anxiety      Bipolar disorder (H)      Migraine      Mood disorder (H)      PTSD (post-traumatic stress disorder)     severe       Past Surgical History:   Procedure Laterality Date     NO HISTORY OF SURGERY          Family History   Problem Relation Age of Onset     Arthritis Mother      Depression Mother      Hypertension Mother      Depression Father      Cancer Maternal Grandmother         brain cancer     Arthritis Maternal Grandmother      Diabetes Maternal Grandfather      Arthritis Maternal Grandfather      Musculoskeletal Disorder Maternal Grandfather         \"muscle dz\"     Hypertension Paternal Grandmother      Hypertension Paternal Grandfather      Asthma Sister      C.A.D. No family hx of      Breast Cancer No family hx of      Cancer - colorectal No family hx of      Prostate Cancer No family hx of      Heart Disease No family hx of      Lipids No family hx of      Thyroid Disease No family hx of      Cerebrovascular Disease Other        Social History     Socioeconomic History     Marital status: Single     Spouse name: Not on file     Number of children: Not on file     Years of education: Not on file     Highest education level: Not on file   Occupational History     Not on file   Tobacco Use     Smoking status: Every Day     Packs/day: 0.50     Types: Cigarettes     Smokeless tobacco: Never   Vaping Use     Vaping status: Every Day     Substances: Nicotine     Devices: Disposable, Refillable tank   Substance and Sexual Activity     Alcohol use: No     Drug use: No     Sexual " activity: Yes     Partners: Male     Birth control/protection: Injection   Other Topics Concern     Parent/sibling w/ CABG, MI or angioplasty before 65F 55M? Not Asked   Social History Narrative     Not on file     Social Determinants of Health     Financial Resource Strain: Not on file   Food Insecurity: Not on file   Transportation Needs: Not on file   Physical Activity: Not on file   Stress: Not on file   Social Connections: Not on file   Intimate Partner Violence: Not on file   Housing Stability: Not on file       Outpatient Encounter Medications as of 4/4/2023   Medication Sig Dispense Refill     ACETAMINOPHEN EXTRA STRENGTH 500 MG tablet TAKE 1 TABLET(500 MG) BY MOUTH EVERY 6 HOURS AS NEEDED FOR PAIN 120 tablet 3     amphetamine-dextroamphetamine (ADDERALL XR) 10 MG 24 hr capsule Take 1 capsule (10 mg) by mouth daily In addition to 30mg capsule for a total daily dose of 40mg 30 capsule 0     amphetamine-dextroamphetamine (ADDERALL XR) 30 MG 24 hr capsule Take 1 capsule (30 mg) by mouth daily 30 capsule 0     buPROPion (WELLBUTRIN XL) 300 MG 24 hr tablet Take 1 tablet (300 mg) by mouth every morning 30 tablet 0     gabapentin (NEURONTIN) 300 MG capsule Take 1 capsule (300 mg) by mouth 3 times daily as needed for other (anxiety) 90 capsule 0     guanFACINE (TENEX) 2 MG tablet Take 2 tablets (4 mg) by mouth At Bedtime 60 tablet 0     hydrocortisone (CORTAID) 1 % external cream Apply topically 2 times daily 30 g 0     hydrOXYzine (VISTARIL) 25 MG capsule Take 1 capsule (25 mg) by mouth daily as needed for anxiety 30 capsule 0     hydrOXYzine (VISTARIL) 50 MG capsule Take 2 capsules (100 mg) by mouth At Bedtime 60 capsule 0     ibuprofen (ADVIL/MOTRIN) 600 MG tablet Take 1 tablet (600 mg) by mouth every 6 hours as needed for moderate pain (4-6) (take with food and drink lots of water) 60 tablet 1     lamoTRIgine (LAMICTAL) 100 MG tablet Take 1 tablet by mouth daily (Patient not taking: Reported on 3/3/2023)        mirtazapine (REMERON) 15 MG tablet Take 45 mg by mouth At Bedtime (Patient not taking: Reported on 3/3/2023)       mirtazapine (REMERON) 45 MG tablet Take 1 tablet (45 mg) by mouth At Bedtime 30 tablet 1     NARCAN 4 MG/0.1ML nasal spray  (Patient not taking: Reported on 11/18/2022)       permethrin (ELIMITE) 5 % external cream Apply cream from head to toe (except the face); leave on for 8-14 hours then wash off with water; reapply in 1 week if live mites appear. 60 g 1     Prenatal Vit-Fe Fumarate-FA (PNV FOLIC ACID + IRON) 27-1 MG TABS Take 1 each by mouth daily 100 tablet 3     SUBOXONE 8-2 MG per film PLACE 1 FILM UNDER THE TONGUE TWICE DAILY. 8MG/2MG FILMS.       SUMAtriptan (IMITREX) 50 MG tablet TAKE 1 TABLET BY MOUTH AT ONSET OF HEADACHE FOR MIGRAINE. MAY REPEAT IN 2 HOURS AS NEEDED:. MAX 2 PER DAY 18 tablet 5     tiZANidine (ZANAFLEX) 4 MG tablet TAKE ONE TABLET BY MOUTH THREE TIMES DAILY AS NEEDED 90 tablet 5     No facility-administered encounter medications on file as of 4/4/2023.             Review Of Systems  Skin: As above  Eyes: negative  Ears/Nose/Throat: negative  Respiratory: No shortness of breath, dyspnea on exertion, cough, or hemoptysis  Cardiovascular: negative  Gastrointestinal: negative  Genitourinary: negative  Musculoskeletal: negative  Neurologic: negative  Psychiatric: negative  Hematologic/Lymphatic/Immunologic: negative  Endocrine: negative      O:   NAD, WDWN, Alert & Oriented, Mood & Affect wnl, Vitals stable   Here today alone   General appearance trav ii   Vitals stable   Alert, oriented and in no acute distress   Fissured eczematous plaques on hands and feet      Eyes: Conjunctivae/lids:Normal     ENT: Lips, buccal mucosa, tongue: normal    MSK:Normal    Cardiovascular: peripheral edema none    Pulm: Breathing Normal    Neuro/Psych: Orientation:Normal; Mood/Affect:Normal      A/P:  1. Eczematous hand and foot dermatitis  Pathophysiology discussed with pateint   brody with  wet wraps at bedtime  Return to clinic 2 weeks virtual   It was a pleasure speaking to Beryl Valenzuela today.  Previous clinic  notes and pertinent laboratory tests were reviewed prior to Beryl Valenzuela's visit.  Skin care regimen reviewed with patient: Eliminate harsh soaps, i.e. Dial, zest, irsih spring; Mild soaps such as Cetaphil or Dove sensitive skin, avoid hot or cold showers, aggressive use of emollients including vanicream, cetaphil or cerave discussed with patient.

## 2023-04-04 NOTE — LETTER
"    4/4/2023         RE: Beryl Valenzuela  [address]  [address]  [place] [facility] [address]        Dear Colleague,    Thank you for referring your patient, Beryl Valenzuela, to the [facility]. Please see a copy of my visit note below.    Beryl Valenzuela , a 32 year old year old female patient, I was asked to see by Dr. Lizama for rash on hands and feet.  Has had 3 rounds of scabies treatment.  She cleans a lot.  She is not working.  Patient has no other skin complaints today.  Remainder of the HPI, Meds, PMH, Allergies, FH, and SH was reviewed in chart.      Past Medical History:   Diagnosis Date     Antisocial personality     with borderline traits     Anxiety      Bipolar disorder (H)      Migraine      Mood disorder (H)      PTSD (post-traumatic stress disorder)     severe       Past Surgical History:   Procedure Laterality Date     NO HISTORY OF SURGERY          Family History   Problem Relation Age of Onset     Arthritis Mother      Depression Mother      Hypertension Mother      Depression Father      Cancer Maternal Grandmother         brain cancer     Arthritis Maternal Grandmother      Diabetes Maternal Grandfather      Arthritis Maternal Grandfather      Musculoskeletal Disorder Maternal Grandfather         \"muscle dz\"     Hypertension Paternal Grandmother      Hypertension Paternal Grandfather      Asthma Sister      C.A.D. No family hx of      Breast Cancer No family hx of      Cancer - colorectal No family hx of      Prostate Cancer No family hx of      Heart Disease No family hx of      Lipids No family hx of      Thyroid Disease No family hx of      Cerebrovascular Disease Other        Social History     Socioeconomic History     Marital status: Single     Spouse name: Not on file     Number of children: Not on file     Years of education: Not on file     Highest education level: Not on file   Occupational History     Not on file   Tobacco Use     Smoking status: Every Day     " Packs/day: 0.50     Types: Cigarettes     Smokeless tobacco: Never   Vaping Use     Vaping status: Every Day     Substances: Nicotine     Devices: Disposable, Refillable tank   Substance and Sexual Activity     Alcohol use: No     Drug use: No     Sexual activity: Yes     Partners: Male     Birth control/protection: Injection   Other Topics Concern     Parent/sibling w/ CABG, MI or angioplasty before 65F 55M? Not Asked   Social History Narrative     Not on file     Social Determinants of Health     Financial Resource Strain: Not on file   Food Insecurity: Not on file   Transportation Needs: Not on file   Physical Activity: Not on file   Stress: Not on file   Social Connections: Not on file   Intimate Partner Violence: Not on file   Housing Stability: Not on file       Outpatient Encounter Medications as of 4/4/2023   Medication Sig Dispense Refill     ACETAMINOPHEN EXTRA STRENGTH 500 MG tablet TAKE 1 TABLET(500 MG) BY MOUTH EVERY 6 HOURS AS NEEDED FOR PAIN 120 tablet 3     amphetamine-dextroamphetamine (ADDERALL XR) 10 MG 24 hr capsule Take 1 capsule (10 mg) by mouth daily In addition to 30mg capsule for a total daily dose of 40mg 30 capsule 0     amphetamine-dextroamphetamine (ADDERALL XR) 30 MG 24 hr capsule Take 1 capsule (30 mg) by mouth daily 30 capsule 0     buPROPion (WELLBUTRIN XL) 300 MG 24 hr tablet Take 1 tablet (300 mg) by mouth every morning 30 tablet 0     gabapentin (NEURONTIN) 300 MG capsule Take 1 capsule (300 mg) by mouth 3 times daily as needed for other (anxiety) 90 capsule 0     guanFACINE (TENEX) 2 MG tablet Take 2 tablets (4 mg) by mouth At Bedtime 60 tablet 0     hydrocortisone (CORTAID) 1 % external cream Apply topically 2 times daily 30 g 0     hydrOXYzine (VISTARIL) 25 MG capsule Take 1 capsule (25 mg) by mouth daily as needed for anxiety 30 capsule 0     hydrOXYzine (VISTARIL) 50 MG capsule Take 2 capsules (100 mg) by mouth At Bedtime 60 capsule 0     ibuprofen (ADVIL/MOTRIN) 600 MG  tablet Take 1 tablet (600 mg) by mouth every 6 hours as needed for moderate pain (4-6) (take with food and drink lots of water) 60 tablet 1     lamoTRIgine (LAMICTAL) 100 MG tablet Take 1 tablet by mouth daily (Patient not taking: Reported on 3/3/2023)       mirtazapine (REMERON) 15 MG tablet Take 45 mg by mouth At Bedtime (Patient not taking: Reported on 3/3/2023)       mirtazapine (REMERON) 45 MG tablet Take 1 tablet (45 mg) by mouth At Bedtime 30 tablet 1     NARCAN 4 MG/0.1ML nasal spray  (Patient not taking: Reported on 11/18/2022)       permethrin (ELIMITE) 5 % external cream Apply cream from head to toe (except the face); leave on for 8-14 hours then wash off with water; reapply in 1 week if live mites appear. 60 g 1     Prenatal Vit-Fe Fumarate-FA (PNV FOLIC ACID + IRON) 27-1 MG TABS Take 1 each by mouth daily 100 tablet 3     SUBOXONE 8-2 MG per film PLACE 1 FILM UNDER THE TONGUE TWICE DAILY. 8MG/2MG FILMS.       SUMAtriptan (IMITREX) 50 MG tablet TAKE 1 TABLET BY MOUTH AT ONSET OF HEADACHE FOR MIGRAINE. MAY REPEAT IN 2 HOURS AS NEEDED:. MAX 2 PER DAY 18 tablet 5     tiZANidine (ZANAFLEX) 4 MG tablet TAKE ONE TABLET BY MOUTH THREE TIMES DAILY AS NEEDED 90 tablet 5     No facility-administered encounter medications on file as of 4/4/2023.             Review Of Systems  Skin: As above  Eyes: negative  Ears/Nose/Throat: negative  Respiratory: No shortness of breath, dyspnea on exertion, cough, or hemoptysis  Cardiovascular: negative  Gastrointestinal: negative  Genitourinary: negative  Musculoskeletal: negative  Neurologic: negative  Psychiatric: negative  Hematologic/Lymphatic/Immunologic: negative  Endocrine: negative      O:   NAD, WDWN, Alert & Oriented, Mood & Affect wnl, Vitals stable   Here today alone   General appearance trav ii   Vitals stable   Alert, oriented and in no acute distress   Fissured eczematous plaques on hands and feet      Eyes: Conjunctivae/lids:Normal     ENT: Lips, buccal mucosa,  tongue: normal    MSK:Normal    Cardiovascular: peripheral edema none    Pulm: Breathing Normal    Neuro/Psych: Orientation:Normal; Mood/Affect:Normal      A/P:  1. Eczematous hand and foot dermatitis  Pathophysiology discussed with pateint   brody with wet wraps at bedtime  Return to clinic 2 weeks virtual   It was a pleasure speaking to Beryl Valenzuela today.  Previous clinic  notes and pertinent laboratory tests were reviewed prior to Beryl Valenzuela's visit.  Skin care regimen reviewed with patient: Eliminate harsh soaps, i.e. Dial, zest, irsih spring; Mild soaps such as Cetaphil or Dove sensitive skin, avoid hot or cold showers, aggressive use of emollients including vanicream, cetaphil or cerave discussed with patient.          Again, thank you for allowing me to participate in the care of your patient.        Sincerely,        Meng Ferrer MD

## 2023-04-06 DIAGNOSIS — F90.9 ATTENTION DEFICIT HYPERACTIVITY DISORDER (ADHD), UNSPECIFIED ADHD TYPE: ICD-10-CM

## 2023-04-06 RX ORDER — GUANFACINE 2 MG/1
4 TABLET ORAL AT BEDTIME
Qty: 60 TABLET | Refills: 0 | Status: SHIPPED | OUTPATIENT
Start: 2023-04-06 | End: 2023-05-12

## 2023-04-06 NOTE — TELEPHONE ENCOUNTER
"Last Seen 2/1/23  RTC \"if needed\"  Cancel 1  No-Show 0     Medication requested:   Pending Prescriptions:                       Disp   Refills    guanFACINE (TENEX) 2 MG tablet            60 tab*0            Sig: Take 2 tablets (4 mg) by mouth At Bedtime        From chart note:   - continue guanfacine 4mg at bedtime for attention, anxiety, sleep (questionable benefit from this)       Medication sent to provider for review.        "

## 2023-04-08 LAB — DRUGS UR: NORMAL

## 2023-04-17 ENCOUNTER — MYC MEDICAL ADVICE (OUTPATIENT)
Dept: PSYCHIATRY | Facility: CLINIC | Age: 33
End: 2023-04-17
Payer: COMMERCIAL

## 2023-04-17 DIAGNOSIS — G89.29 CHRONIC NECK PAIN: ICD-10-CM

## 2023-04-17 DIAGNOSIS — M54.2 CHRONIC NECK PAIN: ICD-10-CM

## 2023-04-17 DIAGNOSIS — F90.9 ATTENTION DEFICIT HYPERACTIVITY DISORDER (ADHD), UNSPECIFIED ADHD TYPE: ICD-10-CM

## 2023-04-17 NOTE — TELEPHONE ENCOUNTER
Last seen: 2/1/23  RTC: RTC if needed. Patient currently has plan to establish care with a new psychiatrist.   Cancel: 3/6/23  No-show: none  Next appt: 5/11/23     Incoming refill from Patient via [facility]    Medication requested:   Pending Prescriptions:                       Disp   Refills    amphetamine-dextroamphetamine (ADDERALL X*30 cap*0            Sig: Take 1 capsule (10 mg) by mouth daily In addition           to 30mg capsule for a total daily dose of 40mg    amphetamine-dextroamphetamine (ADDERALL X*30 cap*0            Sig: Take 1 capsule (30 mg) by mouth daily        Last refill per         From chart note:   continue Adderall XR 40mg daily for ADHD      Medication sent to provider for review.

## 2023-04-18 ENCOUNTER — VIRTUAL VISIT (OUTPATIENT)
Dept: DERMATOLOGY | Facility: CLINIC | Age: 33
End: 2023-04-18
Payer: COMMERCIAL

## 2023-04-18 DIAGNOSIS — L30.9 DERMATITIS: Primary | ICD-10-CM

## 2023-04-18 PROCEDURE — 99443 PR PHYSICIAN TELEPHONE EVALUATION 21-30 MIN: CPT | Mod: 93 | Performed by: DERMATOLOGY

## 2023-04-18 RX ORDER — DEXTROAMPHETAMINE SACCHARATE, AMPHETAMINE ASPARTATE MONOHYDRATE, DEXTROAMPHETAMINE SULFATE AND AMPHETAMINE SULFATE 2.5; 2.5; 2.5; 2.5 MG/1; MG/1; MG/1; MG/1
10 CAPSULE, EXTENDED RELEASE ORAL DAILY
Qty: 30 CAPSULE | Refills: 0 | Status: SHIPPED | OUTPATIENT
Start: 2023-04-22 | End: 2023-05-12

## 2023-04-18 RX ORDER — DEXTROAMPHETAMINE SACCHARATE, AMPHETAMINE ASPARTATE MONOHYDRATE, DEXTROAMPHETAMINE SULFATE AND AMPHETAMINE SULFATE 7.5; 7.5; 7.5; 7.5 MG/1; MG/1; MG/1; MG/1
30 CAPSULE, EXTENDED RELEASE ORAL DAILY
Qty: 30 CAPSULE | Refills: 0 | Status: SHIPPED | OUTPATIENT
Start: 2023-04-18 | End: 2023-05-12

## 2023-04-18 NOTE — PROGRESS NOTES
"Beryl Valenzuela is a 32 year old female who is being evaluated via a phone  visit.      The patient has been notified of following:     \"This phone  visit will be conducted via a call between you and your physician/provider. We have found that certain health care needs can be provided without the need for an in-person physical exam.  This service lets us provide the care you need with a video conversation.  If a prescription is necessary we can send it directly to your pharmacy.  If lab work is needed we can place an order for that and you can then stop by our lab to have the test done at a later time.    Phone visits are billed at different rates depending on your insurance coverage.  Please reach out to your insurance provider with any questions.    If during the course of the call the physician/provider feels a phone visit is not appropriate, you will not be charged for this service.\"    Patient has given verbal consent for phone visit? Yes    How would you like to obtain your AVS? Saskia    Beryl Valenzuela is a 32 year old year old female patient here today for f/u rash on hands and feet.  Rash has clear no issues.  She notes some other spots on skin and she would like them looked at.  Patient has no other skin complaints today.  Remainder of the HPI, Meds, PMH, Allergies, FH, and SH was reviewed in chart.      Past Medical History:   Diagnosis Date     Antisocial personality     with borderline traits     Anxiety      Bipolar disorder (H)      Migraine      Mood disorder (H)      PTSD (post-traumatic stress disorder)     severe       Past Surgical History:   Procedure Laterality Date     NO HISTORY OF SURGERY          Family History   Problem Relation Age of Onset     Arthritis Mother      Depression Mother      Hypertension Mother      Depression Father      Cancer Maternal Grandmother         brain cancer     Arthritis Maternal Grandmother      Diabetes Maternal Grandfather      Arthritis " "Maternal Grandfather      Musculoskeletal Disorder Maternal Grandfather         \"muscle dz\"     Hypertension Paternal Grandmother      Hypertension Paternal Grandfather      Asthma Sister      C.A.D. No family hx of      Breast Cancer No family hx of      Cancer - colorectal No family hx of      Prostate Cancer No family hx of      Heart Disease No family hx of      Lipids No family hx of      Thyroid Disease No family hx of      Cerebrovascular Disease Other        Social History     Socioeconomic History     Marital status: Single     Spouse name: Not on file     Number of children: Not on file     Years of education: Not on file     Highest education level: Not on file   Occupational History     Not on file   Tobacco Use     Smoking status: Every Day     Packs/day: 0.50     Types: Cigarettes     Smokeless tobacco: Never   Vaping Use     Vaping status: Every Day     Substances: Nicotine     Devices: Disposable, Refillable tank   Substance and Sexual Activity     Alcohol use: No     Drug use: No     Sexual activity: Yes     Partners: Male     Birth control/protection: Injection   Other Topics Concern     Parent/sibling w/ CABG, MI or angioplasty before 65F 55M? Not Asked   Social History Narrative     Not on file     Social Determinants of Health     Financial Resource Strain: Not on file   Food Insecurity: Not on file   Transportation Needs: Not on file   Physical Activity: Not on file   Stress: Not on file   Social Connections: Not on file   Intimate Partner Violence: Not on file   Housing Stability: Not on file       Outpatient Encounter Medications as of 4/18/2023   Medication Sig Dispense Refill     ACETAMINOPHEN EXTRA STRENGTH 500 MG tablet TAKE 1 TABLET(500 MG) BY MOUTH EVERY 6 HOURS AS NEEDED FOR PAIN 120 tablet 3     [START ON 4/22/2023] amphetamine-dextroamphetamine (ADDERALL XR) 10 MG 24 hr capsule Take 1 capsule (10 mg) by mouth daily In addition to 30mg capsule for a total daily dose of 40mg 30 capsule " 0     amphetamine-dextroamphetamine (ADDERALL XR) 30 MG 24 hr capsule Take 1 capsule (30 mg) by mouth daily 30 capsule 0     buPROPion (WELLBUTRIN XL) 300 MG 24 hr tablet Take 1 tablet (300 mg) by mouth every morning 30 tablet 0     clobetasol (TEMOVATE) 0.05 % external cream Apply sparingly to affected area twice daily as needed.  Do not apply to face. 120 g 3     gabapentin (NEURONTIN) 300 MG capsule Take 1 capsule (300 mg) by mouth 3 times daily as needed for other (anxiety) 90 capsule 0     guanFACINE (TENEX) 2 MG tablet Take 2 tablets (4 mg) by mouth At Bedtime 60 tablet 0     hydrocortisone (CORTAID) 1 % external cream Apply topically 2 times daily 30 g 0     hydrOXYzine (VISTARIL) 25 MG capsule Take 1 capsule (25 mg) by mouth daily as needed for anxiety 30 capsule 0     hydrOXYzine (VISTARIL) 50 MG capsule Take 2 capsules (100 mg) by mouth At Bedtime 60 capsule 0     ibuprofen (ADVIL/MOTRIN) 600 MG tablet Take 1 tablet (600 mg) by mouth every 6 hours as needed for moderate pain (4-6) (take with food and drink lots of water) 60 tablet 1     lamoTRIgine (LAMICTAL) 100 MG tablet Take 1 tablet by mouth daily (Patient not taking: Reported on 3/3/2023)       mirtazapine (REMERON) 15 MG tablet Take 45 mg by mouth At Bedtime (Patient not taking: Reported on 3/3/2023)       mirtazapine (REMERON) 45 MG tablet Take 1 tablet (45 mg) by mouth At Bedtime 30 tablet 1     NARCAN 4 MG/0.1ML nasal spray  (Patient not taking: Reported on 11/18/2022)       permethrin (ELIMITE) 5 % external cream Apply cream from head to toe (except the face); leave on for 8-14 hours then wash off with water; reapply in 1 week if live mites appear. 60 g 1     Prenatal Vit-Fe Fumarate-FA (PNV FOLIC ACID + IRON) 27-1 MG TABS Take 1 each by mouth daily 100 tablet 3     SUBOXONE 8-2 MG per film PLACE 1 FILM UNDER THE TONGUE TWICE DAILY. 8MG/2MG FILMS.       SUMAtriptan (IMITREX) 50 MG tablet TAKE 1 TABLET BY MOUTH AT ONSET OF HEADACHE FOR MIGRAINE.  MAY REPEAT IN 2 HOURS AS NEEDED:. MAX 2 PER DAY 18 tablet 5     tiZANidine (ZANAFLEX) 4 MG tablet TAKE 1 TABLET(4 MG) BY MOUTH THREE TIMES DAILY AS NEEDED FOR MUSCLE SPASMS 90 tablet 5     No facility-administered encounter medications on file as of 4/18/2023.             Review Of Systems  Skin: As above  Eyes: negative  Ears/Nose/Throat: negative  Respiratory: No shortness of breath, dyspnea on exertion, cough, or hemoptysis  Cardiovascular: negative  Gastrointestinal: negative  Genitourinary: negative  Musculoskeletal: negative  Neurologic: negative  Psychiatric: negative  Hematologic/Lymphatic/Immunologic: negative  Endocrine: negative      O:   Alert & Orientedx3, Mood & Affect wnl,    General appearance normal   Alert, oriented and in no acute distress    Rash clear    Pulm: Breathing Normal, talking in normal sentences, no shortness of breath during conversation    Neuro/Psych: Orientation:Alert and Orientedx3 ; Mood/Affect:normal ; no anxiety or depression     A/P:  1.dermatitis cleared  Skin care discussed with patient   2. Schedule skin check for brown spot son body   It was a pleasure speaking to Beryl Valenzuela today.  Previous clinic  notes and pertinent laboratory tests were reviewed prior to Beryl Valenzuela's visit.  Skin care regimen reviewed with patient: Eliminate harsh soaps, i.e. Dial, zest, irsih spring; Mild soaps such as Cetaphil or Dove sensitive skin, avoid hot or cold showers, aggressive use of emollients including vanicream, cetaphil or cerave discussed with patient.      Teledermatology information:  - Location of patient: home  - Location of teledermatologist: [facility]   - Reason teledermatology is appropriate: of National Emergency Regarding Coronavirus disease (COVID 19) Outbreak  - The patient's condition can safely be assessed using telemedicine: yes  - Method of transmission: store and forward teledermatology  - In-person dermatology visit recommendation: no  - Service start  time:1145am/pm  - Service end time:1208am/pm  - Date of report: 04/18/23

## 2023-04-18 NOTE — LETTER
"    4/18/2023         RE: Beryl Valenzuela  [address]  [address]  [place] [address]        Dear Colleague,    Thank you for referring your patient, Beryl Valenzuela, to the [facility]. Please see a copy of my visit note below.        Beryl Valenzuela is a 32 year old female who is being evaluated via a phone  visit.      The patient has been notified of following:     \"This phone  visit will be conducted via a call between you and your physician/provider. We have found that certain health care needs can be provided without the need for an in-person physical exam.  This service lets us provide the care you need with a video conversation.  If a prescription is necessary we can send it directly to your pharmacy.  If lab work is needed we can place an order for that and you can then stop by our lab to have the test done at a later time.    Phone visits are billed at different rates depending on your insurance coverage.  Please reach out to your insurance provider with any questions.    If during the course of the call the physician/provider feels a phone visit is not appropriate, you will not be charged for this service.\"    Patient has given verbal consent for phone visit? Yes    How would you like to obtain your AVS? Juniorhart    Beryl Valenzuela is a 32 year old year old female patient here today for f/u rash on hands and feet.  Rash has clear no issues.  She notes some other spots on skin and she would like them looked at.  Patient has no other skin complaints today.  Remainder of the HPI, Meds, PMH, Allergies, FH, and SH was reviewed in chart.      Past Medical History:   Diagnosis Date     Antisocial personality     with borderline traits     Anxiety      Bipolar disorder (H)      Migraine      Mood disorder (H)      PTSD (post-traumatic stress disorder)     severe       Past Surgical History:   Procedure Laterality Date     NO HISTORY OF SURGERY          Family History   Problem " "Relation Age of Onset     Arthritis Mother      Depression Mother      Hypertension Mother      Depression Father      Cancer Maternal Grandmother         brain cancer     Arthritis Maternal Grandmother      Diabetes Maternal Grandfather      Arthritis Maternal Grandfather      Musculoskeletal Disorder Maternal Grandfather         \"muscle dz\"     Hypertension Paternal Grandmother      Hypertension Paternal Grandfather      Asthma Sister      C.A.D. No family hx of      Breast Cancer No family hx of      Cancer - colorectal No family hx of      Prostate Cancer No family hx of      Heart Disease No family hx of      Lipids No family hx of      Thyroid Disease No family hx of      Cerebrovascular Disease Other        Social History     Socioeconomic History     Marital status: Single     Spouse name: Not on file     Number of children: Not on file     Years of education: Not on file     Highest education level: Not on file   Occupational History     Not on file   Tobacco Use     Smoking status: Every Day     Packs/day: 0.50     Types: Cigarettes     Smokeless tobacco: Never   Vaping Use     Vaping status: Every Day     Substances: Nicotine     Devices: Disposable, Refillable tank   Substance and Sexual Activity     Alcohol use: No     Drug use: No     Sexual activity: Yes     Partners: Male     Birth control/protection: Injection   Other Topics Concern     Parent/sibling w/ CABG, MI or angioplasty before 65F 55M? Not Asked   Social History Narrative     Not on file     Social Determinants of Health     Financial Resource Strain: Not on file   Food Insecurity: Not on file   Transportation Needs: Not on file   Physical Activity: Not on file   Stress: Not on file   Social Connections: Not on file   Intimate Partner Violence: Not on file   Housing Stability: Not on file       Outpatient Encounter Medications as of 4/18/2023   Medication Sig Dispense Refill     ACETAMINOPHEN EXTRA STRENGTH 500 MG tablet TAKE 1 TABLET(500 " MG) BY MOUTH EVERY 6 HOURS AS NEEDED FOR PAIN 120 tablet 3     [START ON 4/22/2023] amphetamine-dextroamphetamine (ADDERALL XR) 10 MG 24 hr capsule Take 1 capsule (10 mg) by mouth daily In addition to 30mg capsule for a total daily dose of 40mg 30 capsule 0     amphetamine-dextroamphetamine (ADDERALL XR) 30 MG 24 hr capsule Take 1 capsule (30 mg) by mouth daily 30 capsule 0     buPROPion (WELLBUTRIN XL) 300 MG 24 hr tablet Take 1 tablet (300 mg) by mouth every morning 30 tablet 0     clobetasol (TEMOVATE) 0.05 % external cream Apply sparingly to affected area twice daily as needed.  Do not apply to face. 120 g 3     gabapentin (NEURONTIN) 300 MG capsule Take 1 capsule (300 mg) by mouth 3 times daily as needed for other (anxiety) 90 capsule 0     guanFACINE (TENEX) 2 MG tablet Take 2 tablets (4 mg) by mouth At Bedtime 60 tablet 0     hydrocortisone (CORTAID) 1 % external cream Apply topically 2 times daily 30 g 0     hydrOXYzine (VISTARIL) 25 MG capsule Take 1 capsule (25 mg) by mouth daily as needed for anxiety 30 capsule 0     hydrOXYzine (VISTARIL) 50 MG capsule Take 2 capsules (100 mg) by mouth At Bedtime 60 capsule 0     ibuprofen (ADVIL/MOTRIN) 600 MG tablet Take 1 tablet (600 mg) by mouth every 6 hours as needed for moderate pain (4-6) (take with food and drink lots of water) 60 tablet 1     lamoTRIgine (LAMICTAL) 100 MG tablet Take 1 tablet by mouth daily (Patient not taking: Reported on 3/3/2023)       mirtazapine (REMERON) 15 MG tablet Take 45 mg by mouth At Bedtime (Patient not taking: Reported on 3/3/2023)       mirtazapine (REMERON) 45 MG tablet Take 1 tablet (45 mg) by mouth At Bedtime 30 tablet 1     NARCAN 4 MG/0.1ML nasal spray  (Patient not taking: Reported on 11/18/2022)       permethrin (ELIMITE) 5 % external cream Apply cream from head to toe (except the face); leave on for 8-14 hours then wash off with water; reapply in 1 week if live mites appear. 60 g 1     Prenatal Vit-Fe Fumarate-FA (PNV  FOLIC ACID + IRON) 27-1 MG TABS Take 1 each by mouth daily 100 tablet 3     SUBOXONE 8-2 MG per film PLACE 1 FILM UNDER THE TONGUE TWICE DAILY. 8MG/2MG FILMS.       SUMAtriptan (IMITREX) 50 MG tablet TAKE 1 TABLET BY MOUTH AT ONSET OF HEADACHE FOR MIGRAINE. MAY REPEAT IN 2 HOURS AS NEEDED:. MAX 2 PER DAY 18 tablet 5     tiZANidine (ZANAFLEX) 4 MG tablet TAKE 1 TABLET(4 MG) BY MOUTH THREE TIMES DAILY AS NEEDED FOR MUSCLE SPASMS 90 tablet 5     No facility-administered encounter medications on file as of 4/18/2023.             Review Of Systems  Skin: As above  Eyes: negative  Ears/Nose/Throat: negative  Respiratory: No shortness of breath, dyspnea on exertion, cough, or hemoptysis  Cardiovascular: negative  Gastrointestinal: negative  Genitourinary: negative  Musculoskeletal: negative  Neurologic: negative  Psychiatric: negative  Hematologic/Lymphatic/Immunologic: negative  Endocrine: negative      O:   Alert & Orientedx3, Mood & Affect wnl,    General appearance normal   Alert, oriented and in no acute distress    Rash clear    Pulm: Breathing Normal, talking in normal sentences, no shortness of breath during conversation    Neuro/Psych: Orientation:Alert and Orientedx3 ; Mood/Affect:normal ; no anxiety or depression     A/P:  1.dermatitis cleared  Skin care discussed with patient   2. Schedule skin check for brown spot son body   It was a pleasure speaking to Beryl Valenzuela today.  Previous clinic  notes and pertinent laboratory tests were reviewed prior to Beryl Valenzuela's visit.  Skin care regimen reviewed with patient: Eliminate harsh soaps, i.e. Dial, zest, irsih spring; Mild soaps such as Cetaphil or Dove sensitive skin, avoid hot or cold showers, aggressive use of emollients including vanicream, cetaphil or cerave discussed with patient.      Teledermatology information:  - Location of patient: home  - Location of teledermatologist: [facility]   - Reason teledermatology is appropriate: of National  Emergency Regarding Coronavirus disease (COVID 19) Outbreak  - The patient's condition can safely be assessed using telemedicine: yes  - Method of transmission: store and forward teledermatology  - In-person dermatology visit recommendation: no  - Service start time:1145am/pm  - Service end time:1208am/pm  - Date of report: 04/18/23        Again, thank you for allowing me to participate in the care of your patient.        Sincerely,        Meng Ferrer MD

## 2023-04-25 ENCOUNTER — OFFICE VISIT (OUTPATIENT)
Dept: DERMATOLOGY | Facility: CLINIC | Age: 33
End: 2023-04-25
Payer: COMMERCIAL

## 2023-04-25 DIAGNOSIS — D22.9 MULTIPLE BENIGN NEVI: ICD-10-CM

## 2023-04-25 DIAGNOSIS — L70.0 ACNE VULGARIS: Primary | ICD-10-CM

## 2023-04-25 DIAGNOSIS — L81.4 LENTIGO: ICD-10-CM

## 2023-04-25 DIAGNOSIS — D18.01 CHERRY ANGIOMA: ICD-10-CM

## 2023-04-25 PROCEDURE — 99213 OFFICE O/P EST LOW 20 MIN: CPT | Performed by: PHYSICIAN ASSISTANT

## 2023-04-25 RX ORDER — CLINDAMYCIN PHOSPHATE 10 UG/ML
LOTION TOPICAL
Qty: 60 ML | Refills: 5 | Status: SHIPPED | OUTPATIENT
Start: 2023-04-25

## 2023-04-25 ASSESSMENT — PAIN SCALES - GENERAL: PAINLEVEL: MODERATE PAIN (5)

## 2023-04-25 NOTE — PATIENT INSTRUCTIONS
Use benzoyl peroxide wash.   Apply Clindamycin lotion twice daily to affected to chest, face, back.

## 2023-04-25 NOTE — LETTER
"    4/25/2023         RE: Beryl Valenzuela  [address]  [address]  [place] [address]        Dear Colleague,    Thank you for referring your patient, Beryl Valenzuela, to the [facility]. Please see a copy of my visit note below.    Beryl Valenzuela is an extremely pleasant 32 year old year old female patient here today for skin check. She notes she is getting new breakouts on chest, back. She notes does have history of bug bites, unsure if it was fleas. She notes she has had numerous pest treatment and notes that she is moving now.  Patient has no other skin complaints today.  Remainder of the HPI, Meds, PMH, Allergies, FH, and SH was reviewed in chart.    Pertinent Hx:   No personal history of skin cancer   Past Medical History:   Diagnosis Date     Antisocial personality     with borderline traits     Anxiety      Bipolar disorder (H)      Migraine      Mood disorder (H)      PTSD (post-traumatic stress disorder)     severe       Past Surgical History:   Procedure Laterality Date     NO HISTORY OF SURGERY          Family History   Problem Relation Age of Onset     Arthritis Mother      Depression Mother      Hypertension Mother      Depression Father      Cancer Maternal Grandmother         brain cancer     Arthritis Maternal Grandmother      Diabetes Maternal Grandfather      Arthritis Maternal Grandfather      Musculoskeletal Disorder Maternal Grandfather         \"muscle dz\"     Hypertension Paternal Grandmother      Hypertension Paternal Grandfather      Asthma Sister      C.A.D. No family hx of      Breast Cancer No family hx of      Cancer - colorectal No family hx of      Prostate Cancer No family hx of      Heart Disease No family hx of      Lipids No family hx of      Thyroid Disease No family hx of      Cerebrovascular Disease Other        Social History     Socioeconomic History     Marital status: Single     Spouse name: Not on file     Number of children: Not on file     " Years of education: Not on file     Highest education level: Not on file   Occupational History     Not on file   Tobacco Use     Smoking status: Every Day     Packs/day: 0.50     Types: Cigarettes     Smokeless tobacco: Never   Vaping Use     Vaping status: Every Day     Substances: Nicotine     Devices: Disposable, Refillable tank   Substance and Sexual Activity     Alcohol use: No     Drug use: No     Sexual activity: Yes     Partners: Male     Birth control/protection: Injection   Other Topics Concern     Parent/sibling w/ CABG, MI or angioplasty before 65F 55M? Not Asked   Social History Narrative     Not on file     Social Determinants of Health     Financial Resource Strain: Not on file   Food Insecurity: Not on file   Transportation Needs: Not on file   Physical Activity: Not on file   Stress: Not on file   Social Connections: Not on file   Intimate Partner Violence: Not on file   Housing Stability: Not on file       Outpatient Encounter Medications as of 4/25/2023   Medication Sig Dispense Refill     benzoyl peroxide 5 % external liquid Use daily to wash to chest and back. 118 g 4     clindamycin (CLEOCIN T) 1 % external lotion Apply twice daily to face, chest, and back. 60 mL 5     ACETAMINOPHEN EXTRA STRENGTH 500 MG tablet TAKE 1 TABLET(500 MG) BY MOUTH EVERY 6 HOURS AS NEEDED FOR PAIN 120 tablet 3     amphetamine-dextroamphetamine (ADDERALL XR) 10 MG 24 hr capsule Take 1 capsule (10 mg) by mouth daily In addition to 30mg capsule for a total daily dose of 40mg 30 capsule 0     amphetamine-dextroamphetamine (ADDERALL XR) 30 MG 24 hr capsule Take 1 capsule (30 mg) by mouth daily 30 capsule 0     buPROPion (WELLBUTRIN XL) 300 MG 24 hr tablet Take 1 tablet (300 mg) by mouth every morning 30 tablet 0     clobetasol (TEMOVATE) 0.05 % external cream Apply sparingly to affected area twice daily as needed.  Do not apply to face. 120 g 3     gabapentin (NEURONTIN) 300 MG capsule Take 1 capsule (300 mg) by mouth 3  times daily as needed for other (anxiety) 90 capsule 0     guanFACINE (TENEX) 2 MG tablet Take 2 tablets (4 mg) by mouth At Bedtime 60 tablet 0     hydrocortisone (CORTAID) 1 % external cream Apply topically 2 times daily 30 g 0     hydrOXYzine (VISTARIL) 25 MG capsule Take 1 capsule (25 mg) by mouth daily as needed for anxiety 30 capsule 0     hydrOXYzine (VISTARIL) 50 MG capsule Take 2 capsules (100 mg) by mouth At Bedtime 60 capsule 0     ibuprofen (ADVIL/MOTRIN) 600 MG tablet Take 1 tablet (600 mg) by mouth every 6 hours as needed for moderate pain (4-6) (take with food and drink lots of water) 60 tablet 1     lamoTRIgine (LAMICTAL) 100 MG tablet Take 1 tablet by mouth daily (Patient not taking: Reported on 3/3/2023)       mirtazapine (REMERON) 15 MG tablet Take 45 mg by mouth At Bedtime (Patient not taking: Reported on 3/3/2023)       mirtazapine (REMERON) 45 MG tablet Take 1 tablet (45 mg) by mouth At Bedtime 30 tablet 1     NARCAN 4 MG/0.1ML nasal spray  (Patient not taking: Reported on 11/18/2022)       permethrin (ELIMITE) 5 % external cream Apply cream from head to toe (except the face); leave on for 8-14 hours then wash off with water; reapply in 1 week if live mites appear. 60 g 1     Prenatal Vit-Fe Fumarate-FA (PNV FOLIC ACID + IRON) 27-1 MG TABS Take 1 each by mouth daily 100 tablet 3     SUBOXONE 8-2 MG per film PLACE 1 FILM UNDER THE TONGUE TWICE DAILY. 8MG/2MG FILMS.       SUMAtriptan (IMITREX) 50 MG tablet TAKE 1 TABLET BY MOUTH AT ONSET OF HEADACHE FOR MIGRAINE. MAY REPEAT IN 2 HOURS AS NEEDED:. MAX 2 PER DAY 18 tablet 5     tiZANidine (ZANAFLEX) 4 MG tablet TAKE 1 TABLET(4 MG) BY MOUTH THREE TIMES DAILY AS NEEDED FOR MUSCLE SPASMS 90 tablet 5     No facility-administered encounter medications on file as of 4/25/2023.             O:   NAD, WDWN, Alert & Oriented, Mood & Affect wnl, Vitals stable   Here today alone   There were no vitals taken for this visit.   General appearance normal   Vitals  stable   Alert, oriented and in no acute distress     Brown papules and macules with regular pigment network and border on torso and extremities   brown macules on trunk and ext   Red papules on trunk  Healing inflammatory papules on chest and back      The remainder of skin exam is normal       Eyes: Conjunctivae/lids:Normal     ENT: Lips: normal    MSK:Normal    Cardiovascular: peripheral edema none    Pulm: Breathing Normal    Neuro/Psych: Orientation:Alert and Orientedx3 ; Mood/Affect:normal   A/P:  1. Acne vulgaris   Use benzoyl peroxide wash.   Apply clindamycin twice daily to chest, back, face   2. lentigo, angioma, benign nevi   It was a pleasure speaking to Beryl Valenzuela today.  BENIGN LESIONS DISCUSSED WITH PATIENT:  I discussed the specifics of tumor, prognosis, and genetics of benign lesions.  I explained that treatment of these lesions would be purely cosmetic and not medically neccessary.  I discussed with patient different removal options including excision, cautery and /or laser.      Nature and genetics of benign skin lesions dicussed with patient.  Signs and Symptoms of skin cancer discussed with patient.  ABCDEs of melanoma reviewed with patient.  Patient encouraged to perform monthly skin exams.  UV precautions reviewed with patient.  Risks of non-melanoma skin cancer discussed with patient   Return to clinic in one year or sooner if needed.       Again, thank you for allowing me to participate in the care of your patient.        Sincerely,        Brittney Escoto PA-C

## 2023-04-25 NOTE — PROGRESS NOTES
"Beryl Valenzuela is an extremely pleasant 32 year old year old female patient here today for skin check. She notes she is getting new breakouts on chest, back. She notes does have history of bug bites, unsure if it was fleas. She notes she has had numerous pest treatment and notes that she is moving now.  Patient has no other skin complaints today.  Remainder of the HPI, Meds, PMH, Allergies, FH, and SH was reviewed in chart.    Pertinent Hx:   No personal history of skin cancer   Past Medical History:   Diagnosis Date     Antisocial personality     with borderline traits     Anxiety      Bipolar disorder (H)      Migraine      Mood disorder (H)      PTSD (post-traumatic stress disorder)     severe       Past Surgical History:   Procedure Laterality Date     NO HISTORY OF SURGERY          Family History   Problem Relation Age of Onset     Arthritis Mother      Depression Mother      Hypertension Mother      Depression Father      Cancer Maternal Grandmother         brain cancer     Arthritis Maternal Grandmother      Diabetes Maternal Grandfather      Arthritis Maternal Grandfather      Musculoskeletal Disorder Maternal Grandfather         \"muscle dz\"     Hypertension Paternal Grandmother      Hypertension Paternal Grandfather      Asthma Sister      C.A.D. No family hx of      Breast Cancer No family hx of      Cancer - colorectal No family hx of      Prostate Cancer No family hx of      Heart Disease No family hx of      Lipids No family hx of      Thyroid Disease No family hx of      Cerebrovascular Disease Other        Social History     Socioeconomic History     Marital status: Single     Spouse name: Not on file     Number of children: Not on file     Years of education: Not on file     Highest education level: Not on file   Occupational History     Not on file   Tobacco Use     Smoking status: Every Day     Packs/day: 0.50     Types: Cigarettes     Smokeless tobacco: Never   Vaping Use     Vaping status: " Every Day     Substances: Nicotine     Devices: Disposable, Refillable tank   Substance and Sexual Activity     Alcohol use: No     Drug use: No     Sexual activity: Yes     Partners: Male     Birth control/protection: Injection   Other Topics Concern     Parent/sibling w/ CABG, MI or angioplasty before 65F 55M? Not Asked   Social History Narrative     Not on file     Social Determinants of Health     Financial Resource Strain: Not on file   Food Insecurity: Not on file   Transportation Needs: Not on file   Physical Activity: Not on file   Stress: Not on file   Social Connections: Not on file   Intimate Partner Violence: Not on file   Housing Stability: Not on file       Outpatient Encounter Medications as of 4/25/2023   Medication Sig Dispense Refill     benzoyl peroxide 5 % external liquid Use daily to wash to chest and back. 118 g 4     clindamycin (CLEOCIN T) 1 % external lotion Apply twice daily to face, chest, and back. 60 mL 5     ACETAMINOPHEN EXTRA STRENGTH 500 MG tablet TAKE 1 TABLET(500 MG) BY MOUTH EVERY 6 HOURS AS NEEDED FOR PAIN 120 tablet 3     amphetamine-dextroamphetamine (ADDERALL XR) 10 MG 24 hr capsule Take 1 capsule (10 mg) by mouth daily In addition to 30mg capsule for a total daily dose of 40mg 30 capsule 0     amphetamine-dextroamphetamine (ADDERALL XR) 30 MG 24 hr capsule Take 1 capsule (30 mg) by mouth daily 30 capsule 0     buPROPion (WELLBUTRIN XL) 300 MG 24 hr tablet Take 1 tablet (300 mg) by mouth every morning 30 tablet 0     clobetasol (TEMOVATE) 0.05 % external cream Apply sparingly to affected area twice daily as needed.  Do not apply to face. 120 g 3     gabapentin (NEURONTIN) 300 MG capsule Take 1 capsule (300 mg) by mouth 3 times daily as needed for other (anxiety) 90 capsule 0     guanFACINE (TENEX) 2 MG tablet Take 2 tablets (4 mg) by mouth At Bedtime 60 tablet 0     hydrocortisone (CORTAID) 1 % external cream Apply topically 2 times daily 30 g 0     hydrOXYzine (VISTARIL) 25  MG capsule Take 1 capsule (25 mg) by mouth daily as needed for anxiety 30 capsule 0     hydrOXYzine (VISTARIL) 50 MG capsule Take 2 capsules (100 mg) by mouth At Bedtime 60 capsule 0     ibuprofen (ADVIL/MOTRIN) 600 MG tablet Take 1 tablet (600 mg) by mouth every 6 hours as needed for moderate pain (4-6) (take with food and drink lots of water) 60 tablet 1     lamoTRIgine (LAMICTAL) 100 MG tablet Take 1 tablet by mouth daily (Patient not taking: Reported on 3/3/2023)       mirtazapine (REMERON) 15 MG tablet Take 45 mg by mouth At Bedtime (Patient not taking: Reported on 3/3/2023)       mirtazapine (REMERON) 45 MG tablet Take 1 tablet (45 mg) by mouth At Bedtime 30 tablet 1     NARCAN 4 MG/0.1ML nasal spray  (Patient not taking: Reported on 11/18/2022)       permethrin (ELIMITE) 5 % external cream Apply cream from head to toe (except the face); leave on for 8-14 hours then wash off with water; reapply in 1 week if live mites appear. 60 g 1     Prenatal Vit-Fe Fumarate-FA (PNV FOLIC ACID + IRON) 27-1 MG TABS Take 1 each by mouth daily 100 tablet 3     SUBOXONE 8-2 MG per film PLACE 1 FILM UNDER THE TONGUE TWICE DAILY. 8MG/2MG FILMS.       SUMAtriptan (IMITREX) 50 MG tablet TAKE 1 TABLET BY MOUTH AT ONSET OF HEADACHE FOR MIGRAINE. MAY REPEAT IN 2 HOURS AS NEEDED:. MAX 2 PER DAY 18 tablet 5     tiZANidine (ZANAFLEX) 4 MG tablet TAKE 1 TABLET(4 MG) BY MOUTH THREE TIMES DAILY AS NEEDED FOR MUSCLE SPASMS 90 tablet 5     No facility-administered encounter medications on file as of 4/25/2023.             O:   NAD, WDWN, Alert & Oriented, Mood & Affect wnl, Vitals stable   Here today alone   There were no vitals taken for this visit.   General appearance normal   Vitals stable   Alert, oriented and in no acute distress     Brown papules and macules with regular pigment network and border on torso and extremities   brown macules on trunk and ext   Red papules on trunk  Healing inflammatory papules on chest and back      The  remainder of skin exam is normal       Eyes: Conjunctivae/lids:Normal     ENT: Lips: normal    MSK:Normal    Cardiovascular: peripheral edema none    Pulm: Breathing Normal    Neuro/Psych: Orientation:Alert and Orientedx3 ; Mood/Affect:normal   A/P:  1. Acne vulgaris   Use benzoyl peroxide wash.   Apply clindamycin twice daily to chest, back, face   2. lentigo, angioma, benign nevi   It was a pleasure speaking to Beryl Valenzuela today.  BENIGN LESIONS DISCUSSED WITH PATIENT:  I discussed the specifics of tumor, prognosis, and genetics of benign lesions.  I explained that treatment of these lesions would be purely cosmetic and not medically neccessary.  I discussed with patient different removal options including excision, cautery and /or laser.      Nature and genetics of benign skin lesions dicussed with patient.  Signs and Symptoms of skin cancer discussed with patient.  ABCDEs of melanoma reviewed with patient.  Patient encouraged to perform monthly skin exams.  UV precautions reviewed with patient.  Risks of non-melanoma skin cancer discussed with patient   Return to clinic in one year or sooner if needed.

## 2023-04-25 NOTE — NURSING NOTE
Chief Complaint   Patient presents with     Skin Check     New freckles, flea bites       There were no vitals filed for this visit.  Wt Readings from Last 1 Encounters:   03/30/23 67.8 kg (149 lb 6.4 oz)       Yudith Kelly LPN .................4/25/2023

## 2023-04-28 DIAGNOSIS — F41.1 GAD (GENERALIZED ANXIETY DISORDER): ICD-10-CM

## 2023-04-28 RX ORDER — HYDROXYZINE PAMOATE 25 MG/1
CAPSULE ORAL
Qty: 30 CAPSULE | Refills: 0 | Status: SHIPPED | OUTPATIENT
Start: 2023-04-28 | End: 2023-05-31

## 2023-04-28 NOTE — TELEPHONE ENCOUNTER
Medication requested: HYDROXYZINE PAMOATE 25MG CAPSULES   Last refilled: 3/23/23  Qty: 30/0      Last seen: 2/1//23  RTC: RTC if needed. Patient currently has plan to establish care with a new psychiatrist. One month of refills provided  Per 4/17/23 - this plan fell through, appt made with Dr Johnson    Cancel: 1 ( 3/6)  No-show: 0  Next appt: 5/11/23    Refill decision: Refilled for 30 days per protocol.

## 2023-05-10 DIAGNOSIS — E44.1 MILD MALNUTRITION (H): ICD-10-CM

## 2023-05-10 DIAGNOSIS — M35.7 HYPERMOBILITY SYNDROME: ICD-10-CM

## 2023-05-10 NOTE — PROGRESS NOTES
[facility]  Psychiatry Clinic  MEDICAL PROGRESS NOTE     CARE TEAM:  PCP- Jamison Greene    Psychotherapist- yes     Addiction med (Shawna Owens) - Dr. Arias       Beryl Valenzuela is a 32 year old who uses the name Hanane and pronouns she, her, hers.      DIAGNOSIS     PTSD  Opioid use disorder, severe, in sustained remission (on agonist therapy, suboxone)  Unspecified depressive disorder: MDD vs persistent depressive disorder vs bipolar II disorder (history of bipolar II disorder)  ADHD per history  Antisocial personality disorder per history  Borderline personality disorder per history     ASSESSMENT     Beryl is a 31yo seen today for follow-up. She is doing well overall. Low mood, energy, and motivation have improved. Anxiety and symptoms of panic have improved. She recently established care with a new psychiatrist, but reports that she would like to transfer back to this clinic due to concern that new provider doesn't have time to see her and won't prescribe Adderall without neuropsych testing (which Beryl reports having completed in the past, but is having trouble locating). No medication changes planned today. Discussed 2D6 interaction between Wellbutrin and Adderall (which effectively increases Adderall). She appears to be tolerating this combination of medications at this time. Reviewed importance of transferring all psychiatric care back to this clinic for now and suggested that patient seek psychiatric care through [facility] (where she is already established).    Patient reports that she is maintaining sobriety. No recent concerns for substance use/misuse. MNPMP indicates taking medications as prescribed. No acute safety concerns.    Patient has been counseled on potentially dangerous interactions between Suboxone, gabapentin, and tizanidine, which can lead to CNS and respiratory depression.    Future considerations:  -pain management referral within our  system (currently seeing addiction med provider through [facility] + separate PCP for pain management that she rarely sees, would likely benefit from single provider prescribing suboxone + any other pain-related medications)  -continue to weigh pros/cons of trial on lower doses of adderall and/or wellbutrin (may be contributing to anxiety, although Beryl does not report this)    [place]MP was checked today:  Indicates taking controlled medication as prescribed.     PLAN                                                                                                                1) Meds-   - continue Adderall XR 40mg daily for ADHD  - continue bupropion 450mg XL daily for ADHD, mood  - continue mirtazapine 45mg at bedtime for mood  - DECREASE guanfacine to 3mg at bedtime for attention, anxiety, sleep (questionable benefit from this)  - continue hydroxyzine 100 mg at bedtime for sleep, anxiety  - continue hydroxyzine 25 mg daily PRN for anxiety  - continue gabapentin 300mg TID PRN for anxiety       - suboxone 8-2mg BID (per addiction med)  - tizanidine (per PCP) ** counseled to reduce by 1 dose/day **   - Imitrex (per PCP) has used once in last 2 weeks    2) Psychotherapy- None currently, recently established with an individual psychotherapist who has since left the [facility] system     3) Next due-  Labs- PRN  EKG- PRN  Rating scales- PHQ9, GAD7    4) Referrals-  adult adhd testing     5) Dispo- RTC ~8 weeks    PERTINENT ITEM HISTORY                                                                                          [most recent eval 8/4/22]     Pertinent Items Include: suicide attempt [x1-2 (once at age 6?, once at age 15)], suicidal ideation, psychosis [sxs include auditory & visual hallucinations, in context of substance use], mutiple psychotropic trials , trauma hx, substance use: opiates and heroin and substance use treatment    Patient reports that she was on a stable medication regimen for 2 years (Adderall,  "Klonopin, Wellbutrin, mirtazapine - prescribed through [facility]). At that time she held a job and was \"functioning normally.\" However, she reports that she relapsed on opioids and was discharged from [facility]    SUBJECTIVE     Since the time of the last visit:  - doing \"ok\"  - moved into a new apartment in [place] that is infested with roaches. Still, feels this apartment is better than the last.   - saw new psychiatric provider (Dr. Worthington through [facility]). Felt this person didn't have time to treat her. This prescriber is providing everything but Adderall due to inability to locate patient's neuropsych testing. Beryl isn't sure if she will continue to see this physician.  - has been taking Wellbutrin 450 mg (increased by Dr. Worthington) + Adderall 40 mg daily.  - no palpitations, no increase in anxiety,   - anxiety is \"stable\"  - unclear whether guanfacine is helpful for anxiety. Discussed option to decrease dose to 3mg.  - low energy during the day. Is generally tired.   - overall things are \"a lot better.\"  - making progress regarding custody issues with father of youngest child  - maintaining sobriety.  - working to obtain SSDI  - has a therapist through [facility]  - starting family therapy with mom on Friday  - No SI/HI/SIB    Recent Psych Symptoms:    Depression:  poor concentration /memory and overwhelmed  Anxiety:  nervous/overwhelmed  Elevated:  mood dysregulation  Psychosis:  none  Trauma: endorses flashbacks    Recent Substance Use:   Tobacco, caffeine. No other use.    Pertinent Negatives: No suicidal or violent ideation, self-injury and psychosis  Adverse Effects: none     MEDICAL HISTORY and ALLERGY     ALLERGIES: Acetaminophen, Lubricants [personal lubricant], Vicodin [hydrocodone-acetaminophen], and Nonoxynol 9    Patient Active Problem List   Diagnosis     CARDIOVASCULAR SCREENING; LDL GOAL LESS THAN 160     Osteoarthritis     Abnormal Pap smear of cervix     Antisocial " personality     Anxiety     Depression, unspecified depression type     Bipolar disorder (H)     Hypermobility syndrome     Acute right otitis media     Migraine     Neck pain     Hx of drug abuse (H)     Positive SHANTA (antinuclear antibody)     Fibromyalgia     Insomnia     Cluster B personality disorder (H)     PTSD (post-traumatic stress disorder)     Generalized anxiety disorder with panic attacks     Opioid use disorder, severe, in early remission (H)      MEDICAL REVIEW OF SYSTEMS   Contraception- deferred Pregnant- No    A comprehensive review of systems was performed and is negative other than noted in the HPI.     MEDICATIONS     Current Outpatient Medications   Medication Sig Dispense Refill     ACETAMINOPHEN EXTRA STRENGTH 500 MG tablet TAKE 1 TABLET(500 MG) BY MOUTH EVERY 6 HOURS AS NEEDED FOR PAIN 120 tablet 3     amphetamine-dextroamphetamine (ADDERALL XR) 10 MG 24 hr capsule Take 1 capsule (10 mg) by mouth daily In addition to 30mg capsule for a total daily dose of 40mg 30 capsule 0     amphetamine-dextroamphetamine (ADDERALL XR) 30 MG 24 hr capsule Take 1 capsule (30 mg) by mouth daily 30 capsule 0     benzoyl peroxide 5 % external liquid Use daily to wash to chest and back. 118 g 4     buPROPion (WELLBUTRIN XL) 300 MG 24 hr tablet Take 1 tablet (300 mg) by mouth every morning 30 tablet 0     clindamycin (CLEOCIN T) 1 % external lotion Apply twice daily to face, chest, and back. 60 mL 5     clobetasol (TEMOVATE) 0.05 % external cream Apply sparingly to affected area twice daily as needed.  Do not apply to face. 120 g 3     gabapentin (NEURONTIN) 300 MG capsule Take 1 capsule (300 mg) by mouth 3 times daily as needed for other (anxiety) 90 capsule 0     guanFACINE (TENEX) 2 MG tablet Take 2 tablets (4 mg) by mouth At Bedtime 60 tablet 0     hydrocortisone (CORTAID) 1 % external cream Apply topically 2 times daily 30 g 0     hydrOXYzine (VISTARIL) 25 MG capsule TAKE 1 CAPSULE(25 MG) BY MOUTH DAILY AS  NEEDED FOR ANXIETY 30 capsule 0     hydrOXYzine (VISTARIL) 50 MG capsule Take 2 capsules (100 mg) by mouth At Bedtime 60 capsule 0     ibuprofen (ADVIL/MOTRIN) 600 MG tablet Take 1 tablet (600 mg) by mouth every 6 hours as needed for moderate pain (4-6) (take with food and drink lots of water) 60 tablet 1     lamoTRIgine (LAMICTAL) 100 MG tablet Take 1 tablet by mouth daily (Patient not taking: Reported on 3/3/2023)       mirtazapine (REMERON) 15 MG tablet Take 45 mg by mouth At Bedtime (Patient not taking: Reported on 3/3/2023)       mirtazapine (REMERON) 45 MG tablet Take 1 tablet (45 mg) by mouth At Bedtime 30 tablet 1     NARCAN 4 MG/0.1ML nasal spray  (Patient not taking: Reported on 11/18/2022)       permethrin (ELIMITE) 5 % external cream Apply cream from head to toe (except the face); leave on for 8-14 hours then wash off with water; reapply in 1 week if live mites appear. 60 g 1     Prenatal Vit-Fe Fumarate-FA (PNV FOLIC ACID + IRON) 27-1 MG TABS Take 1 each by mouth daily 100 tablet 3     SUBOXONE 8-2 MG per film PLACE 1 FILM UNDER THE TONGUE TWICE DAILY. 8MG/2MG FILMS.       SUMAtriptan (IMITREX) 50 MG tablet TAKE 1 TABLET BY MOUTH AT ONSET OF HEADACHE FOR MIGRAINE. MAY REPEAT IN 2 HOURS AS NEEDED:. MAX 2 PER DAY 18 tablet 5     tiZANidine (ZANAFLEX) 4 MG tablet TAKE 1 TABLET(4 MG) BY MOUTH THREE TIMES DAILY AS NEEDED FOR MUSCLE SPASMS 90 tablet 5      VITALS   There were no vitals taken for this visit.    MENTAL STATUS EXAM     Alertness: alert   Appearance: casually groomed  Behavior/Demeanor: cooperative, pleasant and calm, with good  eye contact   Speech: normal and regular rate and rhythm  Language: intact  Psychomotor: normal or unremarkable  Mood: anxious   Affect: full range; congruent to: mood- yes, content- yes  Thought Process/Associations: unremarkable  Thought Content:  Reports none;  Denies suicidal & violent ideation and delusions  Perception:  Reports none;  Denies hallucinations  Insight:  fair  Judgment: fair  Cognition: does  appear grossly intact; formal cognitive testing was not done  Gait and Station: N/A (telehealth)     LABS and DATA         11/30/2022     2:34 PM 12/29/2022     2:36 PM 2/1/2023     2:38 PM   PHQ   PHQ-9 Total Score 11 15 18   Q9: Thoughts of better off dead/self-harm past 2 weeks Not at all Not at all Not at all       No lab results found.  No lab results found.    ECG 11/18/22 QTcH 444     PSYCHOTROPIC DRUG INTERACTIONS                                       PSYCHCLINICDDI     mirtazapine + suboxone + Adderall +sumatriptan + Wellbutrin = increased risk of sertonin syndrome  Adderall + Wellbutrin: increased exposure of Adderall d/t p450 2d6 interaction  hydroxyzine + Suboxone + mirtazepine: risk of Qtc prolongation   gabapentin + Suboxone + sumatriptan + tizanidine: risk of CNS and respiratory depression    MANAGEMENT:  Monitoring for adverse effects     RISK STATEMENT for SAFETY     Beryl did not appear to be an imminent safety risk to self or others.    TREATMENT RISK STATEMENT: The risks, benefits, alternatives and potential adverse effects have been discussed and are understood by the pt. The pt understands the risks of using street drugs or alcohol. There are no medical contraindications, the pt agrees to treatment with the ability to do so. The pt knows to call the clinic for any problems or to access emergency care if needed.  Medical and substance use concerns are documented above.  Psychotropic drug interaction check was done, including changes made today.     PROVIDER: Shefali Johnson MD    Patient staffed in clinic with Dr. Flor who will sign the note.  Supervisor is Dr. Owusu.

## 2023-05-11 ENCOUNTER — VIRTUAL VISIT (OUTPATIENT)
Dept: PSYCHIATRY | Facility: CLINIC | Age: 33
End: 2023-05-11
Attending: PSYCHIATRY & NEUROLOGY
Payer: COMMERCIAL

## 2023-05-11 DIAGNOSIS — F41.1 GAD (GENERALIZED ANXIETY DISORDER): ICD-10-CM

## 2023-05-11 DIAGNOSIS — F90.9 ATTENTION DEFICIT HYPERACTIVITY DISORDER (ADHD), UNSPECIFIED ADHD TYPE: ICD-10-CM

## 2023-05-11 DIAGNOSIS — M35.7 HYPERMOBILITY SYNDROME: ICD-10-CM

## 2023-05-11 PROCEDURE — G0463 HOSPITAL OUTPT CLINIC VISIT: HCPCS | Mod: PN,GT | Performed by: STUDENT IN AN ORGANIZED HEALTH CARE EDUCATION/TRAINING PROGRAM

## 2023-05-11 PROCEDURE — 99214 OFFICE O/P EST MOD 30 MIN: CPT | Mod: GC | Performed by: STUDENT IN AN ORGANIZED HEALTH CARE EDUCATION/TRAINING PROGRAM

## 2023-05-11 RX ORDER — PRENATAL WITH FERROUS FUM AND FOLIC ACID 3080; 920; 120; 400; 22; 1.84; 3; 20; 10; 1; 12; 200; 27; 25; 2 [IU]/1; [IU]/1; MG/1; [IU]/1; MG/1; MG/1; MG/1; MG/1; MG/1; MG/1; UG/1; MG/1; MG/1; MG/1; MG/1
TABLET ORAL
Qty: 100 TABLET | Refills: 3 | Status: SHIPPED | OUTPATIENT
Start: 2023-05-11

## 2023-05-11 RX ORDER — IBUPROFEN 600 MG/1
TABLET, FILM COATED ORAL
Qty: 60 TABLET | Refills: 1 | Status: SHIPPED | OUTPATIENT
Start: 2023-05-11 | End: 2023-08-09

## 2023-05-11 ASSESSMENT — PAIN SCALES - GENERAL: PAINLEVEL: NO PAIN (0)

## 2023-05-11 ASSESSMENT — ANXIETY QUESTIONNAIRES
1. FEELING NERVOUS, ANXIOUS, OR ON EDGE: NEARLY EVERY DAY
2. NOT BEING ABLE TO STOP OR CONTROL WORRYING: NEARLY EVERY DAY
IF YOU CHECKED OFF ANY PROBLEMS ON THIS QUESTIONNAIRE, HOW DIFFICULT HAVE THESE PROBLEMS MADE IT FOR YOU TO DO YOUR WORK, TAKE CARE OF THINGS AT HOME, OR GET ALONG WITH OTHER PEOPLE: EXTREMELY DIFFICULT
7. FEELING AFRAID AS IF SOMETHING AWFUL MIGHT HAPPEN: MORE THAN HALF THE DAYS
6. BECOMING EASILY ANNOYED OR IRRITABLE: MORE THAN HALF THE DAYS
GAD7 TOTAL SCORE: 19
3. WORRYING TOO MUCH ABOUT DIFFERENT THINGS: NEARLY EVERY DAY
GAD7 TOTAL SCORE: 19
5. BEING SO RESTLESS THAT IT IS HARD TO SIT STILL: NEARLY EVERY DAY
8. IF YOU CHECKED OFF ANY PROBLEMS, HOW DIFFICULT HAVE THESE MADE IT FOR YOU TO DO YOUR WORK, TAKE CARE OF THINGS AT HOME, OR GET ALONG WITH OTHER PEOPLE?: EXTREMELY DIFFICULT
7. FEELING AFRAID AS IF SOMETHING AWFUL MIGHT HAPPEN: MORE THAN HALF THE DAYS
4. TROUBLE RELAXING: NEARLY EVERY DAY
GAD7 TOTAL SCORE: 19

## 2023-05-11 ASSESSMENT — PATIENT HEALTH QUESTIONNAIRE - PHQ9
SUM OF ALL RESPONSES TO PHQ QUESTIONS 1-9: 16
SUM OF ALL RESPONSES TO PHQ QUESTIONS 1-9: 16
10. IF YOU CHECKED OFF ANY PROBLEMS, HOW DIFFICULT HAVE THESE PROBLEMS MADE IT FOR YOU TO DO YOUR WORK, TAKE CARE OF THINGS AT HOME, OR GET ALONG WITH OTHER PEOPLE: EXTREMELY DIFFICULT

## 2023-05-11 NOTE — NURSING NOTE
Is the patient currently in the state of [place]? YES    Visit mode:VIDEO    If the visit is dropped, the patient can be reconnected by: VIDEO VISIT: Text to cell phone: [phone]    Will anyone else be joining the visit? NO      How would you like to obtain your AVS? MyChart    Are changes needed to the allergy or medication list? NO    Reason for visit: Video Visit

## 2023-05-11 NOTE — PROGRESS NOTES
Virtual Visit Details    Type of service:  Video Visit   Video Start Time: 9:10  Video End Time:10:30    Originating Location (pt. Location): Home    Distant Location (provider location):  On-site  Platform used for Video Visit: Eddie

## 2023-05-12 ENCOUNTER — TELEPHONE (OUTPATIENT)
Dept: PSYCHIATRY | Facility: CLINIC | Age: 33
End: 2023-05-12
Payer: COMMERCIAL

## 2023-05-12 RX ORDER — BUPROPION HYDROCHLORIDE 450 MG/1
450 TABLET, FILM COATED, EXTENDED RELEASE ORAL EVERY MORNING
Qty: 30 TABLET | Refills: 1 | Status: SHIPPED | OUTPATIENT
Start: 2023-05-12 | End: 2023-07-03

## 2023-05-12 RX ORDER — HYDROXYZINE PAMOATE 50 MG/1
100 CAPSULE ORAL AT BEDTIME
Qty: 60 CAPSULE | Refills: 1 | Status: SHIPPED | OUTPATIENT
Start: 2023-05-12 | End: 2023-07-03

## 2023-05-12 RX ORDER — GUANFACINE 2 MG/1
3 TABLET ORAL AT BEDTIME
Qty: 45 TABLET | Refills: 1 | Status: SHIPPED | OUTPATIENT
Start: 2023-05-12 | End: 2023-07-14

## 2023-05-12 RX ORDER — MIRTAZAPINE 45 MG/1
45 TABLET, FILM COATED ORAL AT BEDTIME
Qty: 30 TABLET | Refills: 1 | Status: SHIPPED | OUTPATIENT
Start: 2023-05-12 | End: 2023-07-14

## 2023-05-12 RX ORDER — GABAPENTIN 300 MG/1
300 CAPSULE ORAL 3 TIMES DAILY PRN
Qty: 90 CAPSULE | Refills: 1 | Status: SHIPPED | OUTPATIENT
Start: 2023-05-12 | End: 2023-06-27

## 2023-05-12 NOTE — TELEPHONE ENCOUNTER
Shefali Johnson MD Labossiere, Laura, PH[facility]  Good morning Vivi!     I saw Beryl yesterday for a follow-up. It seems she has established care with another psychiatrist who is prescribing all of her medications with the exception of Adderall. I discussed with her that we can take over her care again and prescribe all of her medications if she chooses and that having two providers is ill-advised. She indicated that she would like to transfer care back to this clinic. I will fill her prescriptions, but I am wondering if you are able to call the pharmacy and cancel refills of meds prescribed by the other psychiatrist (Dr. Worthington through [facility])?     Thank you!!   Shefali

## 2023-05-12 NOTE — TELEPHONE ENCOUNTER
Writer called [facility] and spoke with pharmacist. She cancelled all remaining prescriptions with Dr. Velazco.     Vivi Connell RN on 5/12/2023 at 11:21 AM

## 2023-05-13 RX ORDER — DEXTROAMPHETAMINE SACCHARATE, AMPHETAMINE ASPARTATE MONOHYDRATE, DEXTROAMPHETAMINE SULFATE AND AMPHETAMINE SULFATE 2.5; 2.5; 2.5; 2.5 MG/1; MG/1; MG/1; MG/1
10 CAPSULE, EXTENDED RELEASE ORAL DAILY
Qty: 30 CAPSULE | Refills: 0 | Status: SHIPPED | OUTPATIENT
Start: 2023-05-18 | End: 2023-06-19

## 2023-05-13 RX ORDER — DEXTROAMPHETAMINE SACCHARATE, AMPHETAMINE ASPARTATE MONOHYDRATE, DEXTROAMPHETAMINE SULFATE AND AMPHETAMINE SULFATE 7.5; 7.5; 7.5; 7.5 MG/1; MG/1; MG/1; MG/1
30 CAPSULE, EXTENDED RELEASE ORAL DAILY
Qty: 30 CAPSULE | Refills: 0 | Status: SHIPPED | OUTPATIENT
Start: 2023-05-18 | End: 2023-06-19

## 2023-05-28 DIAGNOSIS — F41.1 GAD (GENERALIZED ANXIETY DISORDER): ICD-10-CM

## 2023-05-31 RX ORDER — HYDROXYZINE PAMOATE 25 MG/1
25 CAPSULE ORAL DAILY PRN
Qty: 30 CAPSULE | Refills: 0 | Status: SHIPPED | OUTPATIENT
Start: 2023-05-31 | End: 2023-07-14

## 2023-05-31 NOTE — TELEPHONE ENCOUNTER
Medication requested: HYDROXYZINE PAMOATE 25MG CAPSULES  Last refilled: 4/28/23  Qty: 30      Last seen: 5/11/23  RTC: 8 weeks  Cancel: 0  No-show: 0  Next appt: 0    Refill decision:   30 day fatimah refill sent to the pharmacy - including instructions for patient to call the clinic and schedule an appointment.  Scheduling has been notified to contact the pt for appointment.

## 2023-06-02 ENCOUNTER — HEALTH MAINTENANCE LETTER (OUTPATIENT)
Age: 33
End: 2023-06-02

## 2023-06-08 ENCOUNTER — OFFICE VISIT (OUTPATIENT)
Dept: RHEUMATOLOGY | Facility: CLINIC | Age: 33
End: 2023-06-08
Payer: COMMERCIAL

## 2023-06-08 VITALS
HEART RATE: 78 BPM | BODY MASS INDEX: 26.62 KG/M2 | DIASTOLIC BLOOD PRESSURE: 76 MMHG | SYSTOLIC BLOOD PRESSURE: 122 MMHG | OXYGEN SATURATION: 99 % | WEIGHT: 143.2 LBS

## 2023-06-08 DIAGNOSIS — M25.50 POLYARTHRALGIA: ICD-10-CM

## 2023-06-08 DIAGNOSIS — M35.7 BENIGN JOINT HYPERMOBILITY: Primary | ICD-10-CM

## 2023-06-08 DIAGNOSIS — G89.29 CHRONIC PAIN OF BOTH KNEES: ICD-10-CM

## 2023-06-08 DIAGNOSIS — G89.29 CHRONIC PAIN OF BOTH SHOULDERS: ICD-10-CM

## 2023-06-08 DIAGNOSIS — M25.561 CHRONIC PAIN OF BOTH KNEES: ICD-10-CM

## 2023-06-08 DIAGNOSIS — M25.562 CHRONIC PAIN OF BOTH KNEES: ICD-10-CM

## 2023-06-08 DIAGNOSIS — M25.512 CHRONIC PAIN OF BOTH SHOULDERS: ICD-10-CM

## 2023-06-08 DIAGNOSIS — M25.511 CHRONIC PAIN OF BOTH SHOULDERS: ICD-10-CM

## 2023-06-08 PROCEDURE — 99204 OFFICE O/P NEW MOD 45 MIN: CPT | Performed by: INTERNAL MEDICINE

## 2023-06-08 NOTE — NURSING NOTE
RAPID3 (0-30) Cumulative Score  25.2          RAPID3 Weighted Score (divide #4 by 3 and that is the weighted score)  8.0

## 2023-06-08 NOTE — PROGRESS NOTES
Rheumatology Clinic Visit      Beryl Valenzuela MRN# [id number]   YOB: 1990 Age: 33 year old      Date of visit: 6/08/23   Referring provider: Dr. Jamison Greene  PCP: Dr. Jamison Greene    Chief Complaint   Patient presents with:  Consult: Joint pain      Assessment and Plan     1.  Joint hypermobility: Beighton score for joint hypermobility: 5.  Reviewed the diagnosis of joint hypermobility and the importance of regular physical therapy exercises.  We discussed the importance of not hyperextending joints.  This is a diagnosis that was made by 2 previous rheumatologists and I agree with this diagnosis.  I stressed the importance of starting physical therapy and she is in agreement.  Discussed that she could try using topical Voltaren gel instead of other oral NSAIDs such as ibuprofen, to reduce systemic NSAID exposure and she says she would like to try this.  The location she feels like she would need the Voltaren gel: Shoulders and knees.  - PT referral  - diclofenac (VOLTAREN) 1 % topical gel; Apply up to 2 grams of 1% gel to upper extremity and up to 4 grams of 1% gel to lower extremity up to 4 times daily as needed for joint pain.  Do not use with other oral NSAIDs.  Dispense: 200 g; Refill: 0    2.  Cigarette smoking: Advised cessation    3.  Whole body pain: No rheumatologic inflammatory disease identified to explain the whole body pain.  Joint hypermobility contributing but does not explain all of the body pain she describes. She will continue following with her primary care provider for management.    4.  Positive SHANTA: Previous work-up was negative for RF, CCP, RNP, Bermudez, SSA, SSB, Scl-70.  Normal complement C3 and C4.  She does not have symptoms to suggest an SHANTA-associated rheumatologic disorder.  No further work-up for the positive SHANTA needed at this time.    Total minutes spent in evaluation with patient, documentation, , and review of pertinent studies and chart notes: 46    Ms.  Bianca verbalized agreement with and understanding of the rational for the diagnosis and treatment plan.  All questions were answered to best of my ability and the patient's satisfaction. Ms. Valenzuela was advised to contact the clinic with any questions that may arise after the clinic visit.      Thank you for involving me in the care of the patient    Return to clinic: No scheduled return appointment in rheumatology needed at this time.       JOSH Valenzuela is a 33 year old female with a past medical history significant for anxiety, depression, PTSD, bipolar disorder, fibromyalgia, history of drug abuse, migraines, hypermobility syndrome, osteoarthritis, and fibromyalgia who presents for initial rheumatology evaluation of polyarthralgia.    12/4/2012 [facility] rheumatology note by Dr. Lalo Zaidi documents that the patient meets criteria for benign joint hypermobility syndrome.  She also has central and peripheral pain hypersensitivity with nonrestorative sleep.    8/6/2013 [facility] rheumatology note by Dr. Anabelle Horowitz documents that investigations for the positive SHANTA were negative and she does not have features of a connective tissue disease.  Arthralgias related to fibromyalgia and hypermobility syndrome.  Patient was instructed to continue following with the pain clinic for fibromyalgia management.    10/8/2022 MyChart message from the patient to Dr. Greene: Patient requests evaluation of body pain and possible joint hypermobility syndrome.  Joints are locking and giving out while walking, affecting her daily activities.  Uncomfortable to walk, run, jump, stand, sit, lay.  Subsequently referred to rheumatology.     9/28/2012 rheumatoid factor negative, SHANTA by EIA positive    6/11/2013 C3 and C4 normal; negative CCP, RNP, Bermudez, SSA, SSB, Scl-70, RF    6/26/2015 HCV antibody negative    Today, 6/8/2023: Beryl reports having many years of joint pain.  In 2021 she had a  heroin overdose and after that she has noticed that she cannot hold things well due to reduced strength, memory is poor, trouble staying on track with any specific task.  Feels like the right shoulder, right knee, and left hip falls out of place and she is able to move the joint and put it back into place.  Back and neck pain all the time; worse with activity. Pinching sensation in her hand randomly. Knees lock and sometimes give out.      Denies fevers, chills, nausea, vomiting, constipation, diarrhea; but may have nausea is suboxone dose is missed. No abdominal pain. No black or bloody stools.  No chest pain/pressure, palpitations, or shortness of breath. No LE swelling. No neck pain. No oral or nasal sores.  No rash. No sicca symptoms. No photosensitivity or photophobia. No eye pain or redness. No history of inflammatory eye disease. No history of inflammatory bowel disease.  No history of DVT or pulmonary embolism; possibly one 1st trimester miscarriage, and she reports that her daughters twin  in utero.  No history of serositis.  No history of Raynaud's Phenomenon.  No known seizure disorder.  No known renal disorder.      Tobacco: 2 cigarettes daily, and e-cigs  EtOH: none  Drugs: none; 19 mo sober (heroine previously)  Occupation: not working currently    ROS   12 point review of system was completed and negative except as noted in the HPI     Active Problem List     Patient Active Problem List   Diagnosis     CARDIOVASCULAR SCREENING; LDL GOAL LESS THAN 160     Osteoarthritis     Abnormal Pap smear of cervix     Antisocial personality     Anxiety     Depression, unspecified depression type     Bipolar disorder (H)     Hypermobility syndrome     Acute right otitis media     Migraine     Neck pain     Hx of drug abuse (H)     Positive SHANTA (antinuclear antibody)     Fibromyalgia     Insomnia     Cluster B personality disorder (H)     PTSD (post-traumatic stress disorder)     Generalized anxiety disorder  with panic attacks     Opioid use disorder, severe, in early remission (H)     Past Medical History     Past Medical History:   Diagnosis Date     Antisocial personality     with borderline traits     Anxiety      Bipolar disorder (H)      Migraine      Mood disorder (H)      PTSD (post-traumatic stress disorder)     severe     Past Surgical History     Past Surgical History:   Procedure Laterality Date     NO HISTORY OF SURGERY       Allergy     Allergies   Allergen Reactions     Acetaminophen GI Disturbance     Lubricants [Personal Lubricant]      Vicodin [Hydrocodone-Acetaminophen]      H/O opiate use     Nonoxynol 9 Rash     Current Medication List     Current Outpatient Medications   Medication Sig     ACETAMINOPHEN EXTRA STRENGTH 500 MG tablet TAKE 1 TABLET(500 MG) BY MOUTH EVERY 6 HOURS AS NEEDED FOR PAIN     amphetamine-dextroamphetamine (ADDERALL XR) 10 MG 24 hr capsule Take 1 capsule (10 mg) by mouth daily In addition to 30mg capsule for a total daily dose of 40mg     benzoyl peroxide 5 % external liquid Use daily to wash to chest and back.     buPROPion 450 MG TB24 Take 450 mg by mouth every morning     clindamycin (CLEOCIN T) 1 % external lotion Apply twice daily to face, chest, and back.     clobetasol (TEMOVATE) 0.05 % external cream Apply sparingly to affected area twice daily as needed.  Do not apply to face.     diclofenac (VOLTAREN) 1 % topical gel Apply up to 2 grams of 1% gel to upper extremity and up to 4 grams of 1% gel to lower extremity up to 4 times daily as needed for joint pain.  Do not use with other oral NSAIDs.     gabapentin (NEURONTIN) 300 MG capsule Take 1 capsule (300 mg) by mouth 3 times daily as needed for other (anxiety)     guanFACINE (TENEX) 2 MG tablet Take 1.5 tablets (3 mg) by mouth At Bedtime     hydrocortisone (CORTAID) 1 % external cream Apply topically 2 times daily     hydrOXYzine (VISTARIL) 25 MG capsule Take 1 capsule (25 mg) by mouth daily as needed for anxiety For  "more refills,schedule an appointment at [phone]     hydrOXYzine (VISTARIL) 50 MG capsule Take 2 capsules (100 mg) by mouth At Bedtime     ibuprofen (ADVIL/MOTRIN) 600 MG tablet TAKE 1 TABLET BY MOUTH EVERY 6 HOURS AS NEEDED FOR MODERATE PAIN WITH FOOD AND LOTS OF WATER     mirtazapine (REMERON) 45 MG tablet Take 1 tablet (45 mg) by mouth At Bedtime     permethrin (ELIMITE) 5 % external cream Apply cream from head to toe (except the face); leave on for 8-14 hours then wash off with water; reapply in 1 week if live mites appear.     Prenatal 27-1 MG TABS TAKE 1 TABLET BY MOUTH EVERY DAY     SUBOXONE 8-2 MG per film PLACE 1 FILM UNDER THE TONGUE TWICE DAILY. 8MG/2MG FILMS.     SUMAtriptan (IMITREX) 50 MG tablet TAKE 1 TABLET BY MOUTH AT ONSET OF HEADACHE FOR MIGRAINE. MAY REPEAT IN 2 HOURS AS NEEDED:. MAX 2 PER DAY     tiZANidine (ZANAFLEX) 4 MG tablet TAKE 1 TABLET(4 MG) BY MOUTH THREE TIMES DAILY AS NEEDED FOR MUSCLE SPASMS     amphetamine-dextroamphetamine (ADDERALL XR) 30 MG 24 hr capsule Take 1 capsule (30 mg) by mouth daily     lamoTRIgine (LAMICTAL) 100 MG tablet Take 1 tablet by mouth daily (Patient not taking: Reported on 3/3/2023)     mirtazapine (REMERON) 15 MG tablet Take 45 mg by mouth At Bedtime (Patient not taking: Reported on 3/3/2023)     NARCAN 4 MG/0.1ML nasal spray  (Patient not taking: Reported on 11/18/2022)     No current facility-administered medications for this visit.       Social History   See HPI    Family History     Family History   Problem Relation Age of Onset     Arthritis Mother      Depression Mother      Hypertension Mother      Depression Father      Cancer Maternal Grandmother         brain cancer     Arthritis Maternal Grandmother      Diabetes Maternal Grandfather      Arthritis Maternal Grandfather      Musculoskeletal Disorder Maternal Grandfather         \"muscle dz\"     Hypertension Paternal Grandmother      Hypertension Paternal Grandfather      Asthma Sister      C.A.D. " "No family hx of      Breast Cancer No family hx of      Cancer - colorectal No family hx of      Prostate Cancer No family hx of      Heart Disease No family hx of      Lipids No family hx of      Thyroid Disease No family hx of      Cerebrovascular Disease Other      Patient reports not knowing her family history, but then confirms the FHx noted above    Physical Exam     Temp Readings from Last 3 Encounters:   03/30/23 98.2  F (36.8  C) (Tympanic)   03/03/23 97.3  F (36.3  C) (Tympanic)   11/18/22 96.9  F (36.1  C) (Tympanic)     BP Readings from Last 5 Encounters:   06/08/23 122/76   03/30/23 (!) 137/91   03/03/23 (!) 119/96   11/18/22 107/61   11/02/21 111/69     Pulse Readings from Last 1 Encounters:   06/08/23 78     Resp Readings from Last 1 Encounters:   03/30/23 20     Estimated body mass index is 26.62 kg/m  as calculated from the following:    Height as of 11/18/22: 1.562 m (5' 1.5\").    Weight as of this encounter: 65 kg (143 lb 3.2 oz).    GEN: NAD.   HEENT:  Anicteric, noninjected sclera. No obvious external lesions of the ear and nose. Hearing intact.  CV: S1, S2. RRR. No m/r/g  PULM: No increased work of breathing. CTA bilaterally   MSK: MCPs, PIPs, DIPs without swelling or tenderness to palpation.  Wrists without swelling or tenderness to palpation.  Elbows and shoulders without swelling or tenderness to palpation.  Crepitation of both shoulders.  Shoulders with normal range of motion.  Knees, ankles, and MTPs without swelling or tenderness to palpation.  Achilles tendons nontender to palpation. Able to passively dorsiflex the fifth MCPs by at least 90 , oppose the thumbs to the volar aspects of the ipsilateral forearms, and place palms of hands flat on the floor without bending knees.  Unable to hyperextend the elbows by at least 10 , or hyperextend the knees at least 10 .  SKIN: No rash or jaundice seen  PSYCH: Alert. Appropriate.      Labs / Imaging (select studies)     9/28/2012 rheumatoid " factor negative, SHANTA by EIA positive    6/11/2013 C3 and C4 normal; negative CCP, RNP, Bermudez, SSA, SSB, Scl-70, RF    6/26/2015 HCV antibody negative      Hepatitis C  Recent Labs   Lab Test 06/26/15  1933   HCVAB Nonreactive   Assay performance characteristics have not been established for newborns,   infants, and children       HIV Screening  Recent Labs   Lab Test 06/26/15  1933   HIAGAB Nonreactive   HIV-1 p24 Ag & HIV-1/HIV-2 Ab Not Detected       Immunization History     Immunization History   Administered Date(s) Administered     DTAP (<7y) 1990, 1990, 1990, 11/30/1992, 04/23/1996     HPV Quadrivalent 07/05/2007, 02/19/2008, 03/20/2008, 06/23/2008     HepB 07/11/1994, 03/29/1995, 08/17/1995     Influenza (IIV3) PF 01/11/2013     Influenza Vaccine >6 months (Alfuria,Fluzone) 01/21/2020, 11/18/2022     MMR 08/28/1992, 02/17/2003     Mantoux Tuberculin Skin Test 04/18/2014     OPV, trivalent, live 1991, 1991, 10/30/1992, 04/23/1996     TDAP (Adacel,Boostrix) 05/23/2012, 06/05/2020, 08/16/2021     Tetanus 02/17/2003          Chart documentation done in part with Dragon Voice recognition Software. Although reviewed after completion, some word and grammatical error may remain.    Moises Reina MD

## 2023-06-16 ENCOUNTER — MYC MEDICAL ADVICE (OUTPATIENT)
Dept: PSYCHIATRY | Facility: CLINIC | Age: 33
End: 2023-06-16
Payer: COMMERCIAL

## 2023-06-16 DIAGNOSIS — F90.9 ATTENTION DEFICIT HYPERACTIVITY DISORDER (ADHD), UNSPECIFIED ADHD TYPE: ICD-10-CM

## 2023-06-16 NOTE — TELEPHONE ENCOUNTER
Last seen: 05/11/2023  RTC: 8 weeks  Cancel: None  No-show: None  Next appt: None     Incoming refill from Patient via mychart    Medication requested:   Pending Prescriptions:                       Disp   Refills    amphetamine-dextroamphetamine (ADDERALL X*30 cap*0            Sig: Take 1 capsule (30 mg) by mouth daily        Last refill per           From chart note:   - continue Adderall XR 40mg daily for ADHD      Medication sent to provider for review.

## 2023-06-20 RX ORDER — DEXTROAMPHETAMINE SACCHARATE, AMPHETAMINE ASPARTATE MONOHYDRATE, DEXTROAMPHETAMINE SULFATE AND AMPHETAMINE SULFATE 7.5; 7.5; 7.5; 7.5 MG/1; MG/1; MG/1; MG/1
30 CAPSULE, EXTENDED RELEASE ORAL DAILY
Qty: 30 CAPSULE | Refills: 0 | Status: SHIPPED | OUTPATIENT
Start: 2023-06-20 | End: 2023-07-20

## 2023-06-20 RX ORDER — DEXTROAMPHETAMINE SACCHARATE, AMPHETAMINE ASPARTATE MONOHYDRATE, DEXTROAMPHETAMINE SULFATE AND AMPHETAMINE SULFATE 2.5; 2.5; 2.5; 2.5 MG/1; MG/1; MG/1; MG/1
10 CAPSULE, EXTENDED RELEASE ORAL DAILY
Qty: 30 CAPSULE | Refills: 0 | Status: SHIPPED | OUTPATIENT
Start: 2023-06-20 | End: 2023-07-20

## 2023-06-21 ENCOUNTER — TELEPHONE (OUTPATIENT)
Dept: FAMILY MEDICINE | Facility: CLINIC | Age: 33
End: 2023-06-21
Payer: COMMERCIAL

## 2023-06-21 NOTE — TELEPHONE ENCOUNTER
Patient Quality Outreach    Patient is due for the following:   Cervical Cancer Screening - PAP Needed  Physical Preventive Adult Physical      Topic Date Due     COVID-19 Vaccine (1) Never done     Pneumococcal Vaccine (1 - PCV) Never done       Next Steps:   Schedule a Adult Preventative    Type of outreach:    Sent [place] message.      Questions for provider review:    None     Shanae Lr

## 2023-06-27 DIAGNOSIS — M35.7 HYPERMOBILITY SYNDROME: ICD-10-CM

## 2023-06-27 DIAGNOSIS — F41.1 GAD (GENERALIZED ANXIETY DISORDER): ICD-10-CM

## 2023-06-27 RX ORDER — GABAPENTIN 300 MG/1
300 CAPSULE ORAL 3 TIMES DAILY PRN
Qty: 90 CAPSULE | Refills: 1 | Status: SHIPPED | OUTPATIENT
Start: 2023-07-19 | End: 2023-08-22

## 2023-06-27 NOTE — TELEPHONE ENCOUNTER
Last Seen: 5-  RTC: 8 weeks   Cancel: yes   No-Show: none   Next Appt: none     Incoming Refill From [facility]  via  Faxed     Medication Requested: gabapentin (NEURONTIN) 300 MG capsule  Directions: Sig - Route: Take 1 capsule (300 mg) by mouth 3 times daily as needed for other (anxiety) - Oral  Qty:  90   Last Refill:          Medication Refill pended  Per Refill Protocol     Jeanne Bah on 6/27/2023 at 9:52 AM

## 2023-07-03 DIAGNOSIS — F41.1 GAD (GENERALIZED ANXIETY DISORDER): ICD-10-CM

## 2023-07-03 RX ORDER — BUPROPION HYDROCHLORIDE 450 MG/1
450 TABLET, FILM COATED, EXTENDED RELEASE ORAL EVERY MORNING
Qty: 30 TABLET | Refills: 1 | Status: SHIPPED | OUTPATIENT
Start: 2023-07-03 | End: 2023-08-22

## 2023-07-03 RX ORDER — HYDROXYZINE PAMOATE 50 MG/1
100 CAPSULE ORAL AT BEDTIME
Qty: 60 CAPSULE | Refills: 1 | Status: SHIPPED | OUTPATIENT
Start: 2023-07-03 | End: 2023-08-22

## 2023-07-03 NOTE — TELEPHONE ENCOUNTER
Last Seen: 5-  RTC: 8 weeks   Cancel: yes   No-Show: none   Next Appt: none     Incoming Refill From [facility]  via faxed     Medication Requested: buPROPion 450 MG TB24  Directions: ig - Route: Take 450 mg by mouth every morning - Oral  Qty: 30   Last Refill: 6-8-2023    Medication Requested: hydrOXYzine (VISTARIL) 25 MG capsule  Directions: Sig - Route: Take 1 capsule (25 mg) by mouth daily as needed for anxiety For more refills,schedule an appointment at [phone] - Oral  Qty: 30   Last Refill: 6-8-2023          Medication Refill pended Per Refill Protocol     Jeanne Bah on 7/3/2023 at 12:41 PM

## 2023-07-14 DIAGNOSIS — F41.1 GAD (GENERALIZED ANXIETY DISORDER): ICD-10-CM

## 2023-07-14 DIAGNOSIS — F90.9 ATTENTION DEFICIT HYPERACTIVITY DISORDER (ADHD), UNSPECIFIED ADHD TYPE: ICD-10-CM

## 2023-07-14 RX ORDER — HYDROXYZINE PAMOATE 25 MG/1
25 CAPSULE ORAL DAILY PRN
Qty: 30 CAPSULE | Refills: 0 | Status: SHIPPED | OUTPATIENT
Start: 2023-07-14 | End: 2023-08-08

## 2023-07-14 RX ORDER — MIRTAZAPINE 45 MG/1
45 TABLET, FILM COATED ORAL AT BEDTIME
Qty: 30 TABLET | Refills: 1 | Status: SHIPPED | OUTPATIENT
Start: 2023-07-14 | End: 2023-08-22

## 2023-07-14 RX ORDER — GUANFACINE 2 MG/1
3 TABLET ORAL AT BEDTIME
Qty: 45 TABLET | Refills: 1 | Status: SHIPPED | OUTPATIENT
Start: 2023-07-14 | End: 2023-08-22

## 2023-07-14 NOTE — TELEPHONE ENCOUNTER
Last Seen: 5-  RTC: 8 weeks   Cancel: yes   No-Show: none   Next Appt: 8/22/2023 with Dr. Gonzales     Incoming Refill From [facility] via  Faxed     Medication Requested: hydrOXYzine (VISTARIL) 25 MG capsule  Directions: Sig - Route: Take 1 capsule (25 mg) by mouth daily as needed for anxiety  Qty:  30  Last Refill:  6-      Medication Requested: mirtazapine (REMERON) 45 MG tablet  Directions: Sig - Route: Take 1 tablet (45 mg) by mouth At Bedtime - Oral  Qty: 30  Last Refill: 6-    Medication Requested: guanFACINE (TENEX) 2 MG tablet  Directions: Sig - Route: Take 1.5 tablets (3 mg) by mouth At Bedtime  Qty: 45  Last Refill:  6-         PLAN                                                                                                                 1) Meds-   - continue Adderall XR 40mg daily for ADHD  - continue bupropion 450mg XL daily for ADHD, mood  - continue mirtazapine 45mg at bedtime for mood  - DECREASE guanfacine to 3mg at bedtime for attention, anxiety, sleep (questionable benefit from this)  - continue hydroxyzine 100 mg at bedtime for sleep, anxiety  - continue hydroxyzine 25 mg daily PRN for anxiety  - continue gabapentin 300mg TID PRN for anxiety               Medication Refill pended  Per Refill Protocol     Jeanne Bah on 7/14/2023 at 7:21 AM

## 2023-07-19 ENCOUNTER — MYC MEDICAL ADVICE (OUTPATIENT)
Dept: PSYCHIATRY | Facility: CLINIC | Age: 33
End: 2023-07-19
Payer: COMMERCIAL

## 2023-07-19 DIAGNOSIS — F90.9 ATTENTION DEFICIT HYPERACTIVITY DISORDER (ADHD), UNSPECIFIED ADHD TYPE: ICD-10-CM

## 2023-07-20 RX ORDER — DEXTROAMPHETAMINE SACCHARATE, AMPHETAMINE ASPARTATE MONOHYDRATE, DEXTROAMPHETAMINE SULFATE AND AMPHETAMINE SULFATE 7.5; 7.5; 7.5; 7.5 MG/1; MG/1; MG/1; MG/1
30 CAPSULE, EXTENDED RELEASE ORAL DAILY
Qty: 30 CAPSULE | Refills: 0 | Status: SHIPPED | OUTPATIENT
Start: 2023-07-20 | End: 2023-08-22

## 2023-07-20 RX ORDER — DEXTROAMPHETAMINE SACCHARATE, AMPHETAMINE ASPARTATE MONOHYDRATE, DEXTROAMPHETAMINE SULFATE AND AMPHETAMINE SULFATE 2.5; 2.5; 2.5; 2.5 MG/1; MG/1; MG/1; MG/1
10 CAPSULE, EXTENDED RELEASE ORAL DAILY
Qty: 30 CAPSULE | Refills: 0 | Status: SHIPPED | OUTPATIENT
Start: 2023-07-20 | End: 2023-08-16

## 2023-07-20 NOTE — TELEPHONE ENCOUNTER
Writer reviewed patient's chart and the . Gabapentin sent to pharmacy yesterday, 7/19.    Patient is due for Adderall XR 30 mg and 10 mg refills.    Last seen: 5/11/23 (Alex)  RTC: 8 weeks  Cancel: none  No-show: none  Next appt: 8/22/23 (Gonzales)     Medication requested: amphetamine-dextroamphetamine (ADDERALL XR) 10 MG 24 hr capsule  Directions: Take 1 capsule (10 mg) by mouth daily In addition to 30mg capsule for a total daily dose of 40mg   Qty: 30  Last refilled: 6/22/23, 5/23/23, and 4/24/23 per [place]      Medication requested: amphetamine-dextroamphetamine (ADDERALL XR) 30 MG 24 hr capsule  Directions: Take 1 capsule (30 mg) by mouth daily   Qty: 30  Last refilled: 6/22/23, 5/16/23, and 4/18/23 per [place]      Medication refill approved per refill protocol    Adderall prescriptions pended and routed to the providers for approval    Vivi Connell RN on 7/20/2023 at 7:30 AM

## 2023-07-20 NOTE — TELEPHONE ENCOUNTER
Rx Refill note     I received rx refill requests for amphetamine-dextroamphetamine XR 30mg, #30 and amphetamine-dextroamphetamine XR 10mg, #30 - as I am a psychiatric prescriber of the day for the clinic today.     Reviewed portions of Beryl Valenzuela's medical record, including most recent psychiatric progress note by Mere Johnson and Chacho of 5/11/2023. Reviewed notes in this encounter and appreciated [place]  data. Follow-up with ADI Gonzales MD, is scheduled for 8/22/2023.     Will order amphetamine-dextroamphetamine XR 30mg, #30, sig 1 cap po qday, R-0; and amphetamine-dextroamphetamine XR 10mg, #30 , sig 1 cap po qday, R-0.     Piotr Owusu MD

## 2023-08-08 DIAGNOSIS — F41.1 GAD (GENERALIZED ANXIETY DISORDER): ICD-10-CM

## 2023-08-08 RX ORDER — HYDROXYZINE PAMOATE 25 MG/1
25 CAPSULE ORAL DAILY PRN
Qty: 30 CAPSULE | Refills: 0 | Status: SHIPPED | OUTPATIENT
Start: 2023-08-08 | End: 2023-08-22

## 2023-08-08 NOTE — TELEPHONE ENCOUNTER
Medication requested:  HYDROXYZINE PAMOATE 25MG CAPSULES    buddy 1 capsule (25 mg) by mouth daily as needed for anxiety   Last refilled: 7/14/23  Qty: 30/0     Last Seen: 5- continue hydroxyzine 100 mg at bedtime for sleep, anxiety  - continue hydroxyzine 25 mg daily PRN for anxiety  RTC: 8 weeks   Cancel: yes   No-Show: none   Next Appt: 8/22/2023 with Dr. Gonzales     Refill decision:   Refilled for 30 days per protocol.

## 2023-08-09 DIAGNOSIS — M35.7 HYPERMOBILITY SYNDROME: ICD-10-CM

## 2023-08-09 RX ORDER — PSEUDOEPHED/ACETAMINOPH/DIPHEN 30MG-500MG
TABLET ORAL
Qty: 120 TABLET | Refills: 1 | OUTPATIENT
Start: 2023-08-09

## 2023-08-09 RX ORDER — IBUPROFEN 600 MG/1
TABLET, FILM COATED ORAL
Qty: 60 TABLET | Refills: 0 | Status: SHIPPED | OUTPATIENT
Start: 2023-08-09 | End: 2023-12-02

## 2023-08-16 ENCOUNTER — MYC MEDICAL ADVICE (OUTPATIENT)
Dept: PSYCHIATRY | Facility: CLINIC | Age: 33
End: 2023-08-16
Payer: COMMERCIAL

## 2023-08-16 DIAGNOSIS — M35.7 HYPERMOBILITY SYNDROME: ICD-10-CM

## 2023-08-16 DIAGNOSIS — F90.9 ATTENTION DEFICIT HYPERACTIVITY DISORDER (ADHD), UNSPECIFIED ADHD TYPE: Primary | ICD-10-CM

## 2023-08-16 RX ORDER — PSEUDOEPHED/ACETAMINOPH/DIPHEN 30MG-500MG
TABLET ORAL
Qty: 120 TABLET | Refills: 1 | OUTPATIENT
Start: 2023-08-16

## 2023-08-16 RX ORDER — DEXTROAMPHETAMINE SACCHARATE, AMPHETAMINE ASPARTATE MONOHYDRATE, DEXTROAMPHETAMINE SULFATE AND AMPHETAMINE SULFATE 7.5; 7.5; 7.5; 7.5 MG/1; MG/1; MG/1; MG/1
30 CAPSULE, EXTENDED RELEASE ORAL DAILY
Qty: 30 CAPSULE | Refills: 0 | Status: SHIPPED | OUTPATIENT
Start: 2023-08-18 | End: 2023-08-22

## 2023-08-16 RX ORDER — DEXTROAMPHETAMINE SACCHARATE, AMPHETAMINE ASPARTATE MONOHYDRATE, DEXTROAMPHETAMINE SULFATE AND AMPHETAMINE SULFATE 2.5; 2.5; 2.5; 2.5 MG/1; MG/1; MG/1; MG/1
10 CAPSULE, EXTENDED RELEASE ORAL DAILY
Qty: 30 CAPSULE | Refills: 0 | Status: SHIPPED | OUTPATIENT
Start: 2023-08-18 | End: 2023-08-22

## 2023-08-16 NOTE — CONFIDENTIAL NOTE
Last Appt:  5/11/23 with Dr. Johnson  No Show / Cancel:  0  Next Appt:  8/22/23 with Dr. Gonzales    Patient states she will run out tomorrow.  Per  and phone call to pharmacy, both prescriptions were picked up on 7/20.      Per pharmacy, patient received a combined count of generic and name brand for the 10 mg last time due to supply shortage.  Pharmacy confirmed that patient did receive a combined total of #30.          07/20/2023   Dextroamp-Amphet Er 10 Mg Cap  30.00

## 2023-08-17 NOTE — CONFIDENTIAL NOTE
Patient sent [employer] (NOOM) message indicating she is leaving for [place], [place] this morning at 5 am.  She requests the prescription be sent to a pharmacy near or in [place].    Writer contacted [facility] pharmacy - [phone] - they have enough to cover a 15 day supply of Adderall ER 20 mg take 2 daily for a total of 40 mg.  _________    Writer spoke with patient - she has not gone out of town due to car issues, so she does not need a prescription sent to [place].

## 2023-08-21 ENCOUNTER — MYC MEDICAL ADVICE (OUTPATIENT)
Dept: PSYCHIATRY | Facility: CLINIC | Age: 33
End: 2023-08-21
Payer: COMMERCIAL

## 2023-08-22 ENCOUNTER — VIRTUAL VISIT (OUTPATIENT)
Dept: PSYCHIATRY | Facility: CLINIC | Age: 33
End: 2023-08-22
Attending: PSYCHIATRY & NEUROLOGY
Payer: COMMERCIAL

## 2023-08-22 DIAGNOSIS — F90.9 ATTENTION DEFICIT HYPERACTIVITY DISORDER (ADHD), UNSPECIFIED ADHD TYPE: ICD-10-CM

## 2023-08-22 DIAGNOSIS — F41.1 GAD (GENERALIZED ANXIETY DISORDER): ICD-10-CM

## 2023-08-22 DIAGNOSIS — F60.89 CLUSTER B PERSONALITY DISORDER (H): ICD-10-CM

## 2023-08-22 DIAGNOSIS — F31.30 BIPOLAR I DISORDER, MOST RECENT EPISODE DEPRESSED (H): Primary | ICD-10-CM

## 2023-08-22 PROCEDURE — 99214 OFFICE O/P EST MOD 30 MIN: CPT | Mod: VID | Performed by: PSYCHIATRY & NEUROLOGY

## 2023-08-22 PROCEDURE — 90833 PSYTX W PT W E/M 30 MIN: CPT | Mod: VID | Performed by: PSYCHIATRY & NEUROLOGY

## 2023-08-22 RX ORDER — GABAPENTIN 300 MG/1
300 CAPSULE ORAL 3 TIMES DAILY PRN
Qty: 90 CAPSULE | Refills: 1 | Status: SHIPPED | OUTPATIENT
Start: 2023-08-22 | End: 2023-09-26

## 2023-08-22 RX ORDER — DEXTROAMPHETAMINE SACCHARATE, AMPHETAMINE ASPARTATE MONOHYDRATE, DEXTROAMPHETAMINE SULFATE AND AMPHETAMINE SULFATE 2.5; 2.5; 2.5; 2.5 MG/1; MG/1; MG/1; MG/1
10 CAPSULE, EXTENDED RELEASE ORAL DAILY
Qty: 30 CAPSULE | Refills: 0 | Status: SHIPPED | OUTPATIENT
Start: 2023-09-18 | End: 2023-09-26

## 2023-08-22 RX ORDER — MIRTAZAPINE 45 MG/1
45 TABLET, FILM COATED ORAL AT BEDTIME
Qty: 30 TABLET | Refills: 1 | Status: SHIPPED | OUTPATIENT
Start: 2023-08-22 | End: 2023-09-26

## 2023-08-22 RX ORDER — DEXTROAMPHETAMINE SACCHARATE, AMPHETAMINE ASPARTATE MONOHYDRATE, DEXTROAMPHETAMINE SULFATE AND AMPHETAMINE SULFATE 7.5; 7.5; 7.5; 7.5 MG/1; MG/1; MG/1; MG/1
30 CAPSULE, EXTENDED RELEASE ORAL DAILY
Qty: 30 CAPSULE | Refills: 0 | Status: SHIPPED | OUTPATIENT
Start: 2023-09-18 | End: 2023-09-26

## 2023-08-22 RX ORDER — BUPROPION HYDROCHLORIDE 450 MG/1
450 TABLET, FILM COATED, EXTENDED RELEASE ORAL EVERY MORNING
Qty: 30 TABLET | Refills: 1 | Status: SHIPPED | OUTPATIENT
Start: 2023-08-22 | End: 2023-09-26

## 2023-08-22 RX ORDER — HYDROXYZINE PAMOATE 50 MG/1
100 CAPSULE ORAL AT BEDTIME
Qty: 60 CAPSULE | Refills: 1 | Status: SHIPPED | OUTPATIENT
Start: 2023-08-22 | End: 2024-01-02

## 2023-08-22 ASSESSMENT — PATIENT HEALTH QUESTIONNAIRE - PHQ9
SUM OF ALL RESPONSES TO PHQ QUESTIONS 1-9: 20
10. IF YOU CHECKED OFF ANY PROBLEMS, HOW DIFFICULT HAVE THESE PROBLEMS MADE IT FOR YOU TO DO YOUR WORK, TAKE CARE OF THINGS AT HOME, OR GET ALONG WITH OTHER PEOPLE: EXTREMELY DIFFICULT
SUM OF ALL RESPONSES TO PHQ QUESTIONS 1-9: 20

## 2023-08-22 ASSESSMENT — PAIN SCALES - GENERAL: PAINLEVEL: SEVERE PAIN (6)

## 2023-08-22 ASSESSMENT — ANXIETY QUESTIONNAIRES
3. WORRYING TOO MUCH ABOUT DIFFERENT THINGS: NEARLY EVERY DAY
5. BEING SO RESTLESS THAT IT IS HARD TO SIT STILL: NEARLY EVERY DAY
6. BECOMING EASILY ANNOYED OR IRRITABLE: MORE THAN HALF THE DAYS
7. FEELING AFRAID AS IF SOMETHING AWFUL MIGHT HAPPEN: NEARLY EVERY DAY
4. TROUBLE RELAXING: NEARLY EVERY DAY
GAD7 TOTAL SCORE: 20
1. FEELING NERVOUS, ANXIOUS, OR ON EDGE: NEARLY EVERY DAY
GAD7 TOTAL SCORE: 20
2. NOT BEING ABLE TO STOP OR CONTROL WORRYING: NEARLY EVERY DAY
IF YOU CHECKED OFF ANY PROBLEMS ON THIS QUESTIONNAIRE, HOW DIFFICULT HAVE THESE PROBLEMS MADE IT FOR YOU TO DO YOUR WORK, TAKE CARE OF THINGS AT HOME, OR GET ALONG WITH OTHER PEOPLE: EXTREMELY DIFFICULT

## 2023-08-22 NOTE — NURSING NOTE
Is the patient currently in the state of [place]? YES    Visit mode:VIDEO    If the visit is dropped, the patient can be reconnected by: VIDEO VISIT: Text to cell phone:   Telephone Information:   Mobile [phone]       Will anyone else be joining the visit? NO  (If patient encounters technical issues they should call [phone]    How would you like to obtain your AVS? MyChart    Are changes needed to the allergy or medication list? No    Reason for visit: MIGUE COLBERT

## 2023-08-22 NOTE — PATIENT INSTRUCTIONS
It was nice to meet you today. Here is what we discussed:    -Housing support documentation    -Continuing current medications    -Follow-up in one month to discuss more medications    Luis Carlos Gonzales MD  [facility]     **For crisis resources, please see the information at the end of this document**   Patient Education    Thank you for coming to the [facility] [place] [facility].     Lab Testing:  If you had lab testing today and your results are reassuring or normal they will be mailed to you or sent through [email or web address] within 7 days. If the lab tests need quick action we will call you with the results. The phone number we will call with results is # [phone]. If this is not the best number please call our clinic and change the number.     Medication Refills:  If you need any refills please call your pharmacy and they will contact us. Our fax number for refills is [phone].   Three business days of notice are needed for general medication refill requests.   Five business days of notice are needed for controlled substance refill requests.   If you need to change to a different pharmacy, please contact the new pharmacy directly. The new pharmacy will help you get your medications transferred.     Contact Us:  Please call [phone] during business hours (8-5:00 M-F).   If you have medication related questions after clinic hours, or on the weekend, please call [phone].     Financial Assistance [phone]   Medical Records [phone]       MENTAL HEALTH CRISIS RESOURCES:  For a emergency help, please call 911 or go to the nearest Emergency Department.     Emergency Walk-In Options:   [facility] Unit @ [facility] ([place]): [phone] - Specialized mental health emergency area designed to be calming  [facility] ([place]): [phone]  [facility] ([place]):  [phone]  [facility] ([place]): [phone]    [facility] Crisis Information:   [place]: [phone]  [place]: [phone]  [place] OLIVER) - Adult: [phone]     Child: [phone]  [place] - Adult: [phone]     Child: [phone]  [place]: [phone]  List of all [facility] resources:   [email or web address]-care/mental-[email or web address]/resources/crisis-contacts.jsp    National Crisis Information:   Crisis Text Line: Text  [place]  to 282390  Suicide & Crisis Lifeline: 988  National Suicide Prevention Lifeline: 4-663-870-TALK (1-328.175.4422)       For online chat options, visit https://suicidepreventionlifeline.org/chat/  Poison Control Center: 1-401.853.4156  Trans Lifeline: 1-082-671-2815 - Hotline for transgender people of all ages  The Sergio Project: 9-463-002-7366 - Hotline for LGBT youth     For Non-Emergency Support:   Fast Tracker: Mental Health & Substance Use Disorder Resources -   https://www.tapvivan.org/
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
## 2023-08-22 NOTE — PROGRESS NOTES
"Virtual Visit Details    Type of service:  Video Visit   Video Start Time: {video visit start/end time for provider to select:641068}  Video End Time:{video visit start/end time for provider to select:627250}    Originating Location (pt. Location): {video visit patient location:818764::\"Home\"}  {PROVIDER LOCATION On-site should be selected for visits conducted from your clinic location or adjoining [facility] hospital, academic office, or other nearby [facility] building. Off-site should be selected for all other provider locations, including home:735241}  Distant Location (provider location):  {virtual location provider:854594}  Platform used for Video Visit: {Virtual Visit Platforms:092174::\"[employer]\"}    Answers submitted by the patient for this visit:  Patient Health Questionnaire (Submitted on 8/22/2023)  If you checked off any problems, how difficult have these problems made it for you to do your work, take care of things at home, or get along with other people?: Extremely difficult  PHQ9 TOTAL SCORE: 20  JEREMY-7 (Submitted on 8/22/2023)  JEREMY 7 TOTAL SCORE: 20    "

## 2023-08-23 NOTE — TELEPHONE ENCOUNTER
Left messages to call back or respond through my chart how she would like to received the completed forms.    Jeanne Bah on 8/23/2023 at 8:52 AM

## 2023-08-25 NOTE — TELEPHONE ENCOUNTER
Writer followed up with patient . She states that she might not need the forms anymore. She might get approve for section A . But she would like the completed forms emailed to her at [email or web address]       Forms emailed to above address and sent to scan.  Held in nurse triage until scan in chart .      Jeanne Bah on 8/25/2023 at 11:15 AM

## 2023-08-25 NOTE — TELEPHONE ENCOUNTER
Writer followed up with patient . She states that she might not need the forms anymore. She might get approve for section A . But she would like the completed forms emailed to her at [email or web address]     Forms emailed to above address and sent to scan.  Held in nurse triage until scan in chart .    Jeanne Bah on 8/25/2023 at 11:16 AM

## 2023-09-07 DIAGNOSIS — M35.7 HYPERMOBILITY SYNDROME: ICD-10-CM

## 2023-09-07 RX ORDER — PSEUDOEPHED/ACETAMINOPH/DIPHEN 30MG-500MG
TABLET ORAL
Qty: 120 TABLET | Refills: 0 | Status: SHIPPED | OUTPATIENT
Start: 2023-09-07 | End: 2023-12-02

## 2023-09-15 ENCOUNTER — MYC MEDICAL ADVICE (OUTPATIENT)
Dept: PSYCHIATRY | Facility: CLINIC | Age: 33
End: 2023-09-15
Payer: COMMERCIAL

## 2023-09-18 NOTE — TELEPHONE ENCOUNTER
Refills for Adderall 30 mg 24 hr tablet and Adderall 10 mg 24 hr tablet signed 9/18/23 in separate encounter. Will update pt via [employer].

## 2023-09-26 ENCOUNTER — MYC MEDICAL ADVICE (OUTPATIENT)
Dept: FAMILY MEDICINE | Facility: CLINIC | Age: 33
End: 2023-09-26
Payer: COMMERCIAL

## 2023-09-26 DIAGNOSIS — G89.29 CHRONIC NECK PAIN: ICD-10-CM

## 2023-09-26 DIAGNOSIS — M54.2 CHRONIC NECK PAIN: ICD-10-CM

## 2023-10-20 DIAGNOSIS — G89.29 CHRONIC NECK PAIN: ICD-10-CM

## 2023-10-20 DIAGNOSIS — M54.2 CHRONIC NECK PAIN: ICD-10-CM

## 2023-11-28 ENCOUNTER — MYC MEDICAL ADVICE (OUTPATIENT)
Dept: PSYCHIATRY | Facility: CLINIC | Age: 33
End: 2023-11-28
Payer: COMMERCIAL

## 2023-11-28 DIAGNOSIS — F41.1 GAD (GENERALIZED ANXIETY DISORDER): ICD-10-CM

## 2023-11-28 DIAGNOSIS — F90.9 ATTENTION DEFICIT HYPERACTIVITY DISORDER (ADHD), UNSPECIFIED ADHD TYPE: ICD-10-CM

## 2023-11-28 RX ORDER — DEXTROAMPHETAMINE SACCHARATE, AMPHETAMINE ASPARTATE MONOHYDRATE, DEXTROAMPHETAMINE SULFATE AND AMPHETAMINE SULFATE 7.5; 7.5; 7.5; 7.5 MG/1; MG/1; MG/1; MG/1
30 CAPSULE, EXTENDED RELEASE ORAL DAILY
Qty: 30 CAPSULE | Refills: 0 | Status: SHIPPED | OUTPATIENT
Start: 2023-11-28 | End: 2024-01-02

## 2023-11-28 RX ORDER — DEXTROAMPHETAMINE SACCHARATE, AMPHETAMINE ASPARTATE MONOHYDRATE, DEXTROAMPHETAMINE SULFATE AND AMPHETAMINE SULFATE 2.5; 2.5; 2.5; 2.5 MG/1; MG/1; MG/1; MG/1
10 CAPSULE, EXTENDED RELEASE ORAL DAILY
Qty: 30 CAPSULE | Refills: 0 | Status: SHIPPED | OUTPATIENT
Start: 2023-11-28 | End: 2024-01-02

## 2023-11-28 RX ORDER — MIRTAZAPINE 45 MG/1
45 TABLET, FILM COATED ORAL AT BEDTIME
Qty: 30 TABLET | Refills: 0 | Status: SHIPPED | OUTPATIENT
Start: 2023-11-28 | End: 2024-01-02

## 2023-11-28 RX ORDER — BUPROPION HYDROCHLORIDE 450 MG/1
450 TABLET, FILM COATED, EXTENDED RELEASE ORAL EVERY MORNING
Qty: 30 TABLET | Refills: 0 | Status: SHIPPED | OUTPATIENT
Start: 2023-11-28 | End: 2024-01-02

## 2023-11-28 RX ORDER — HYDROXYZINE PAMOATE 50 MG/1
100 CAPSULE ORAL AT BEDTIME
Qty: 60 CAPSULE | Refills: 0 | Status: CANCELLED | OUTPATIENT
Start: 2023-11-28

## 2023-11-28 NOTE — TELEPHONE ENCOUNTER
Medications filled for patient after PDMP check. Scheduling reaching out to make another appointment.

## 2023-11-28 NOTE — TELEPHONE ENCOUNTER
Last seen: 9/26/23  RTC: 6 weeks  Cancel: none  No-show: none  Next appt: none     Incoming refill from Patient via [employer]    Medication requested:   Pending Prescriptions:                       Disp   Refills    amphetamine-dextroamphetamine (ADDERALL X*30 cap*0            Sig: Take 1 capsule (10 mg) by mouth daily In addition           to 30mg capsule for a total daily dose of 40mg    amphetamine-dextroamphetamine (ADDERALL X*30 cap*0            Sig: Take 1 capsule (30 mg) by mouth daily In addition           to one 10 mg capsule for a total daily dose of 40           mg.    buPROPion HCl ER (XL) 450 MG TB24         30 tab*0            Sig: Take 450 mg by mouth every morning    hydrOXYzine (VISTARIL) 50 MG capsule      60 cap*0            Sig: Take 2 capsules (100 mg) by mouth at bedtime    mirtazapine (REMERON) 45 MG tablet        30 tab*0            Sig: Take 1 tablet (45 mg) by mouth at bedtime        Last refill per               From chart note:   - continue Adderall XR 40mg daily for ADHD  - continue bupropion 450mg XL daily for ADHD, mood  - continue mirtazapine 45mg at bedtime for mood  - continue hydroxyzine  mg at bedtime for sleep, anxiety                                      -  25 mg daily PRN for anxiety    Medication refill approved per refill protocol: bupropion and mirtazapine.     Medication sent to provider for review: Adderall 10 mg and Adderall 30 mg.

## 2023-12-01 DIAGNOSIS — M35.7 HYPERMOBILITY SYNDROME: ICD-10-CM

## 2023-12-01 DIAGNOSIS — M54.2 CHRONIC NECK PAIN: ICD-10-CM

## 2023-12-01 DIAGNOSIS — G89.29 CHRONIC NECK PAIN: ICD-10-CM

## 2023-12-02 RX ORDER — PSEUDOEPHED/ACETAMINOPH/DIPHEN 30MG-500MG
TABLET ORAL
Qty: 120 TABLET | Refills: 0 | Status: SHIPPED | OUTPATIENT
Start: 2023-12-02 | End: 2024-02-09

## 2023-12-02 RX ORDER — IBUPROFEN 600 MG/1
TABLET, FILM COATED ORAL
Qty: 60 TABLET | Refills: 0 | Status: SHIPPED | OUTPATIENT
Start: 2023-12-02 | End: 2024-04-08

## 2023-12-07 DIAGNOSIS — G89.29 CHRONIC NECK PAIN: ICD-10-CM

## 2023-12-07 DIAGNOSIS — M54.2 CHRONIC NECK PAIN: ICD-10-CM

## 2023-12-08 ENCOUNTER — MYC MEDICAL ADVICE (OUTPATIENT)
Dept: FAMILY MEDICINE | Facility: CLINIC | Age: 33
End: 2023-12-08
Payer: COMMERCIAL

## 2024-01-18 ENCOUNTER — MYC REFILL (OUTPATIENT)
Dept: FAMILY MEDICINE | Facility: CLINIC | Age: 34
End: 2024-01-18
Payer: COMMERCIAL

## 2024-01-18 DIAGNOSIS — G89.29 CHRONIC NECK PAIN: ICD-10-CM

## 2024-01-18 DIAGNOSIS — M54.2 CHRONIC NECK PAIN: ICD-10-CM

## 2024-01-19 DIAGNOSIS — M54.2 CHRONIC NECK PAIN: ICD-10-CM

## 2024-01-19 DIAGNOSIS — G89.29 CHRONIC NECK PAIN: ICD-10-CM

## 2024-01-19 NOTE — PROGRESS NOTES
PCP out of office. Resent tizanidine due to transmission failure, PCP had sent refill earlier today. Jazmin Cavanaugh, CNP

## 2024-02-08 DIAGNOSIS — M35.7 HYPERMOBILITY SYNDROME: ICD-10-CM

## 2024-02-09 RX ORDER — PSEUDOEPHED/ACETAMINOPH/DIPHEN 30MG-500MG
TABLET ORAL
Qty: 120 TABLET | Refills: 0 | Status: SHIPPED | OUTPATIENT
Start: 2024-02-09

## 2024-04-07 DIAGNOSIS — M35.7 HYPERMOBILITY SYNDROME: ICD-10-CM

## 2024-04-08 RX ORDER — IBUPROFEN 600 MG/1
TABLET, FILM COATED ORAL
Qty: 60 TABLET | Refills: 0 | Status: SHIPPED | OUTPATIENT
Start: 2024-04-08

## 2024-06-23 ENCOUNTER — HEALTH MAINTENANCE LETTER (OUTPATIENT)
Age: 34
End: 2024-06-23

## 2025-07-12 ENCOUNTER — HEALTH MAINTENANCE LETTER (OUTPATIENT)
Age: 35
End: 2025-07-12